# Patient Record
Sex: MALE | Race: WHITE | NOT HISPANIC OR LATINO | Employment: FULL TIME | URBAN - METROPOLITAN AREA
[De-identification: names, ages, dates, MRNs, and addresses within clinical notes are randomized per-mention and may not be internally consistent; named-entity substitution may affect disease eponyms.]

---

## 2017-01-04 ENCOUNTER — PATIENT OUTREACH (OUTPATIENT)
Dept: ADMINISTRATIVE | Facility: HOSPITAL | Age: 57
End: 2017-01-04

## 2017-01-12 ENCOUNTER — PATIENT OUTREACH (OUTPATIENT)
Dept: ADMINISTRATIVE | Facility: HOSPITAL | Age: 57
End: 2017-01-12

## 2017-01-17 ENCOUNTER — TELEPHONE (OUTPATIENT)
Dept: FAMILY MEDICINE | Facility: CLINIC | Age: 57
End: 2017-01-17

## 2017-01-17 ENCOUNTER — APPOINTMENT (OUTPATIENT)
Dept: LAB | Facility: HOSPITAL | Age: 57
End: 2017-01-17
Attending: UROLOGY
Payer: COMMERCIAL

## 2017-01-17 ENCOUNTER — OFFICE VISIT (OUTPATIENT)
Dept: UROLOGY | Facility: CLINIC | Age: 57
End: 2017-01-17
Payer: COMMERCIAL

## 2017-01-17 VITALS
WEIGHT: 213 LBS | HEART RATE: 83 BPM | SYSTOLIC BLOOD PRESSURE: 131 MMHG | HEIGHT: 66 IN | BODY MASS INDEX: 34.23 KG/M2 | DIASTOLIC BLOOD PRESSURE: 87 MMHG

## 2017-01-17 DIAGNOSIS — R31.29 MICROHEMATURIA: ICD-10-CM

## 2017-01-17 DIAGNOSIS — N52.03 COMBINED ARTERIAL INSUFFICIENCY AND CORPORO-VENOUS OCCLUSIVE ERECTILE DYSFUNCTION: Primary | ICD-10-CM

## 2017-01-17 DIAGNOSIS — Z87.442 HISTORY OF KIDNEY STONES: ICD-10-CM

## 2017-01-17 LAB
BILIRUB SERPL-MCNC: ABNORMAL MG/DL
BLOOD URINE, POC: 250
COLOR, POC UA: ABNORMAL
GLUCOSE UR QL STRIP: ABNORMAL
KETONES UR QL STRIP: ABNORMAL
LEUKOCYTE ESTERASE URINE, POC: ABNORMAL
MICROSCOPIC COMMENT: NORMAL
NITRITE, POC UA: ABNORMAL
PH, POC UA: 5
PROTEIN, POC: ABNORMAL
RBC #/AREA URNS HPF: 1 /HPF (ref 0–4)
SPECIFIC GRAVITY, POC UA: 1.01
UROBILINOGEN, POC UA: ABNORMAL
WBC #/AREA URNS HPF: 1 /HPF (ref 0–5)

## 2017-01-17 PROCEDURE — 81002 URINALYSIS NONAUTO W/O SCOPE: CPT | Mod: S$GLB,,, | Performed by: UROLOGY

## 2017-01-17 PROCEDURE — 81000 URINALYSIS NONAUTO W/SCOPE: CPT

## 2017-01-17 PROCEDURE — 99244 OFF/OP CNSLTJ NEW/EST MOD 40: CPT | Mod: 25,S$GLB,, | Performed by: UROLOGY

## 2017-01-17 PROCEDURE — 99999 PR PBB SHADOW E&M-EST. PATIENT-LVL III: CPT | Mod: PBBFAC,,, | Performed by: UROLOGY

## 2017-01-17 RX ORDER — SILDENAFIL 100 MG/1
100 TABLET, FILM COATED ORAL DAILY PRN
Start: 2017-01-17 | End: 2017-02-01 | Stop reason: SDUPTHER

## 2017-01-17 NOTE — MR AVS SNAPSHOT
[facility]  [address]  [place] [address]  Phone: [phone]                  Rich Ballard   2017 10:00 AM   Office Visit    Description:  Male : 1960   Provider:  Kaleb Dahl MD   Department:  [place] [facility]           Reason for Visit     Erectile Dysfunction           Diagnoses this Visit        Comments    Erectile dysfunction, unspecified erectile dysfunction type    -  Primary            To Do List           Future Appointments        Provider Department Dept Phone    2017 1:30 PM [phone]      Goals (5 Years of Data)     None      [facility]     [facility] Care Line -  Assistance  Registered nurses in the [facility] Call Center provide clinical advisement, health education, appointment booking, and other advisory services.  Call for this free service at [phone].             Medications           Message regarding Medications     Verify the changes and/or additions to your medication regime listed below are the same as discussed with your clinician today.  If any of these changes or additions are incorrect, please notify your healthcare provider.             Verify that the below list of medications is an accurate representation of the medications you are currently taking.  If none reported, the list may be blank. If incorrect, please contact your healthcare provider. Carry this list with you in case of emergency.           Current Medications     amlodipine-benazepril 5-20 mg (LOTREL) 5-20 mg per capsule Take 1 capsule by mouth once daily.    atorvastatin (LIPITOR) 20 MG tablet Take 1 tablet (20 mg total) by mouth once daily.    B CMPLX 4/VIT D3/C/FA/ZINC OX (VITAL-D RX ORAL) Take by mouth.    fluticasone (FLONASE) 50 mcg/actuation nasal spray USE TWO SPRAYS IN EACH NOSTRIL ONCE DAILY    nebivolol (BYSTOLIC) 10 MG Tab Take 1 tablet (10 mg total) by mouth once daily.  "   tadalafil (CIALIS) 5 MG tablet Take 1 tablet (5 mg total) by mouth daily as needed for Erectile Dysfunction.           Clinical Reference Information           Vital Signs - Last Recorded  Most recent update: 1/17/2017 10:23 AM by Amanda Ag MA    BP Pulse Ht Wt BMI    131/87 (BP Location: Right arm, Patient Position: Sitting, BP Method: Automatic) 83 5' 6" (1.676 m) 96.6 kg (213 lb) 34.38 kg/m2      Blood Pressure          Most Recent Value    BP  131/87      Allergies as of 1/17/2017     No Known Allergies      Immunizations Administered on Date of Encounter - 1/17/2017     None      "

## 2017-01-17 NOTE — PROGRESS NOTES
History and Physical Exam:  Consult from: dr lopez  Consult for: erectile dysfunction    Rich Ballard is a 56 y.o. male referred by Dr Lopez for evaluation of ED.    Dr Lopez rxed daily 5mg cialis in 9/2014  He took it for 1 month and discontinued due to back pain which he describes as kidney-stone-like pain waking him out of sleep  Did not notice change in erections but at that time was having problems with rigidity  Back then was progressively weaker erection and difficulty maintaining, which has progressed to diffiulty achieving  50/50 difficult achiveing vs maintaining now.    He is concerned that his ED may be related to andropause, which he has read about.  He did have am labs on 12/23/16 demonstrating T 673, free T 10.9.  When checked in 2014 T was also normal at 520    Udip today negative except 250 blood. Concerned about blood in urine.  Never seen gross blood in urine. No UA on record to assess previous micro.  + smoker - started up again last couple months after 1-2 yrs smoke free.  No family history of gu malignancy  + History of kidney stones - always passed on his own - last stone passage over 10-15 years - cut out fried foods/sodas  Time to time sciatic-like pain.      Past Medical History   Diagnosis Date    Allergy     CVA (cerebral infarction)     Hyperlipidemia     Hypertension        Past Surgical History   Procedure Laterality Date    Cervical fusion      Rotator cuff repair      Tumor removal from scapula     surgical correction of [place]    Family History   Problem Relation Age of Onset    Hypertension Mother     COPD Mother     Hypertension Father     Heart disease Father     COPD Father        Social History     Social History    Marital status:      Spouse name: N/A    Number of children: N/A    Years of education: N/A     Occupational History    Not on file.     Social History Main Topics    Smoking status: Former Smoker     Packs/day: 0.50      Types: Cigarettes    Smokeless tobacco: Never Used    Alcohol use No      Comment: very rarely    Drug use: Not on file    Sexual activity: Not on file     Other Topics Concern    Not on file     Social History Narrative       Review of patient's allergies indicates:   Allergen Reactions    No known allergies        Medications Reviewed: see MAR    ROS:    As noted above in HPI otherwise negative x10 systems reviewed.    PHYSICAL EXAM:    Vitals:    01/17/17 1008   BP: 131/87   Pulse: 83     Body mass index is 34.38 kg/(m^2).    General: Alert, cooperative, no distress, appears stated age  Head: Normocephalic, without obvious abnormality, atraumatic  Neck: no masses, no thyromegaly, no lymphadenopathy  Eyes: PERRL, conjunctiva/corneas clear  Lungs: Respirations unlabored, normal effort, no accessory muscle use  CV: Warm and well perfused extremities  Abdomen: Soft, non-tender, no CVA tenderness, no hepatosplenomegaly, no hernia  Penis: phallus normal, circumcised, well cared for, no plaques or lesions.   Scrotum: no cysts, no lesions, no rash, trace L hydrocele.   Epididymes: normal, nontender, symmetrical, small R epidid cyst.  Testes: normal, both descended, no masses.   Urethra: palpably normal with orthotopic meatus of normal size    AMAN: normal sphincter tone, no masses, no hemmorrhoids   PROSTATE: 20g, no nodules, non-tender, symmetrical.   Extremities: Extremities normal, atraumatic, no cyanosis or edema  Skin: Normal color, texture, and turgor, no rashes or lesions  Psych: Appropriate, well oriented, normal affect, normal mood  Neuro: Non-focal    Lab Results   Component Value Date    PSA 1.8 12/23/2016    PSA 1.1 02/11/2008       LABS:    No results found for this or any previous visit (from the past 336 hour(s)).      Assessment/Diagnosis:    1. Combined arterial insufficiency and corporo-venous occlusive erectile dysfunction  POCT URINE DIPSTICK WITHOUT MICROSCOPE   2. Microhematuria  Urinalysis  Microscopic   3. History of kidney stones  Urinalysis Microscopic       Plans:  We discussed all options for ED treatment and their proper use in detail including oral PDE5is, MUSE suppository, vacuum erection device, intracavernosal injection, and penile prosthesis. He does not want any invasive treatment at this point.  We discussed daily vs on demand use of PDE5is. He prefers to take oral medication, but does not want to try cialis again even at on demand dosing regarding back pain.  Discussed it was 2 years ago and may be coincidental, and he was willing to try other oral therapy.  Rxed viagra, and instructed on proper use such as 30 mins prior and on empty stomach. I did have a discussion with him especially given his comorbidities (HTN, HLD) and early onset ED that it is likely of vasculogenic origin and can be a harbinger of CAD. Advised he follow up for eval with Cardiology and will place referral, of which there is no urgency.   Did reassure him he is not hypodonadal or going through andropause given his lab results.  As well he noted a strong libido.  Also discussed that a urine disptick is not a very sensitive test, and to determine if there is clinically significant blood in urine will send UA micro. If >3-5 rbc/hpf, lab defined microhematuria, would pursue workup.  Will RTC 4-6 months for follow up or sooner prn. This note has been routed back to Dr Lopez. 45 mins spent in encounter over half in counseling.

## 2017-01-17 NOTE — LETTER
January 19, 2017      Ced Lopez MD  [address] [facility] [address] [place] [address]           [facility]  [address]  [facility] [address]  Phone: [phone]          Patient: Rich Ballard   MR Number: [id number]   YOB: 1960   Date of Visit: 1/17/2017       Dear Dr. Ced Lopez:    Thank you for referring Rich Ballard to me for evaluation. Attached you will find relevant portions of my assessment and plan of care.    If you have questions, please do not hesitate to call me. I look forward to following Rich Ballard along with you.    Sincerely,    Kaleb Dahl MD    Enclosure  CC:  No Recipients    If you would like to receive this communication electronically, please contact [email or web address] or [phone] to request more information on [employer] access.    For providers and/or their staff who would like to refer a patient to [facility], please contact us through our one-stop-shop provider referral line, [facility], at [phone].    If you feel you have received this communication in error or would no longer like to receive these types of communications, please e-mail [email or web address]

## 2017-01-18 ENCOUNTER — TELEPHONE (OUTPATIENT)
Dept: FAMILY MEDICINE | Facility: CLINIC | Age: 57
End: 2017-01-18

## 2017-01-18 ENCOUNTER — TELEPHONE (OUTPATIENT)
Dept: GASTROENTEROLOGY | Facility: CLINIC | Age: 57
End: 2017-01-18

## 2017-01-18 ENCOUNTER — PATIENT MESSAGE (OUTPATIENT)
Dept: FAMILY MEDICINE | Facility: CLINIC | Age: 57
End: 2017-01-18

## 2017-01-18 DIAGNOSIS — Z12.11 COLON CANCER SCREENING: Primary | ICD-10-CM

## 2017-01-18 NOTE — TELEPHONE ENCOUNTER
----- Message from Guera Ahumada sent at 1/18/2017 12:02 PM CST -----  Contact: [phone] Cynthia Ballard (Spouse)  [phone] Cynthia Ballard (Spouse) returning phone call

## 2017-01-18 NOTE — TELEPHONE ENCOUNTER
[place] his phone.   She will call segundo to get this set up she will let Rich know that his labs look good. His cholesterol is a little elevated. He should follow a low cholesterol diet and increase his exercise. She assured me she will let him know.    asked that i talk to his wife yesterday and will sign a involvement of care form when he comes in.

## 2017-01-18 NOTE — TELEPHONE ENCOUNTER
----- Message from Greg Shine sent at 1/18/2017  8:21 AM CST -----  Contact: pt's wife Cynthia  Pt wife needs to see about getting pt scheduled for a colonscopy  Call Back#[phone]  Thanks

## 2017-01-18 NOTE — TELEPHONE ENCOUNTER
----- Message from Benjamin Lenz sent at 1/18/2017  3:26 PM CST -----  Contact: Patient's wife, Ralph  Per Mrs. Rosas, Dr. Lopez sent over an referral for a colonoscopy. Please call Amanda Ralph to assist with scheduling the patient at [phone]. Thanks.

## 2017-01-18 NOTE — TELEPHONE ENCOUNTER
I signed the order for the colonoscopy.    Normally when I enter a colonoscopy our GI Department schedules this.  They contact the patient and set it up.  I don't know if it will work the same way in [place] so the patient may need to call to schedule the appointment.  I also don't know if the physician in [place] will require him to come in for an appointment first.    Regarding yesterday's message and appointment cancellation.  Please let him know that overall his labs were fairly stable.  His cholesterol was a little higher this time so I recommend can on a low-fat and low-cholesterol diet along with regular exercise.

## 2017-01-18 NOTE — TELEPHONE ENCOUNTER
Spoke with wife   Wants to see our gastrologist. He wants to get it done in [place] if possible with our [facility] md.

## 2017-01-23 DIAGNOSIS — Z13.9 SCREENING: Primary | ICD-10-CM

## 2017-01-30 ENCOUNTER — TELEPHONE (OUTPATIENT)
Dept: GASTROENTEROLOGY | Facility: CLINIC | Age: 57
End: 2017-01-30

## 2017-01-30 NOTE — TELEPHONE ENCOUNTER
----- Message from Mark Nguyen LPN sent at 1/30/2017  2:46 PM CST -----  Contact: Natalie (Wife) [phone]       ----- Message -----     From: RT Avni     Sent: 1/30/2017   2:36 PM       To: David Mar Staff    Natalie (Wife) [phone], requesting a call back soon concerning how is the pt's colonoscopy will be listed, she is trying to find out if it will be the pt's wellness or not, thanks.

## 2017-02-01 ENCOUNTER — TELEPHONE (OUTPATIENT)
Dept: UROLOGY | Facility: CLINIC | Age: 57
End: 2017-02-01

## 2017-02-01 ENCOUNTER — PATIENT MESSAGE (OUTPATIENT)
Dept: UROLOGY | Facility: CLINIC | Age: 57
End: 2017-02-01

## 2017-02-01 ENCOUNTER — ANESTHESIA EVENT (OUTPATIENT)
Dept: ENDOSCOPY | Facility: HOSPITAL | Age: 57
End: 2017-02-01
Payer: COMMERCIAL

## 2017-02-01 ENCOUNTER — SURGERY (OUTPATIENT)
Age: 57
End: 2017-02-01

## 2017-02-01 ENCOUNTER — HOSPITAL ENCOUNTER (OUTPATIENT)
Facility: HOSPITAL | Age: 57
Discharge: HOME | End: 2017-02-01
Attending: INTERNAL MEDICINE | Admitting: INTERNAL MEDICINE
Payer: COMMERCIAL

## 2017-02-01 ENCOUNTER — ANESTHESIA (OUTPATIENT)
Dept: ENDOSCOPY | Facility: HOSPITAL | Age: 57
End: 2017-02-01
Payer: COMMERCIAL

## 2017-02-01 VITALS
OXYGEN SATURATION: 96 % | BODY MASS INDEX: 32.95 KG/M2 | HEART RATE: 68 BPM | WEIGHT: 205 LBS | HEIGHT: 66 IN | SYSTOLIC BLOOD PRESSURE: 166 MMHG | TEMPERATURE: 98 F | RESPIRATION RATE: 16 BRPM | DIASTOLIC BLOOD PRESSURE: 95 MMHG

## 2017-02-01 VITALS — RESPIRATION RATE: 10 BRPM

## 2017-02-01 DIAGNOSIS — Z12.11 SCREEN FOR COLON CANCER: ICD-10-CM

## 2017-02-01 PROCEDURE — 63600175 PHARM REV CODE 636 W HCPCS: Performed by: NURSE ANESTHETIST, CERTIFIED REGISTERED

## 2017-02-01 PROCEDURE — 45380 COLONOSCOPY AND BIOPSY: CPT | Mod: 59,,, | Performed by: INTERNAL MEDICINE

## 2017-02-01 PROCEDURE — 45385 COLONOSCOPY W/LESION REMOVAL: CPT | Performed by: INTERNAL MEDICINE

## 2017-02-01 PROCEDURE — 27201012 HC FORCEPS, HOT/COLD, DISP: Performed by: INTERNAL MEDICINE

## 2017-02-01 PROCEDURE — 45385 COLONOSCOPY W/LESION REMOVAL: CPT | Mod: 33,,, | Performed by: INTERNAL MEDICINE

## 2017-02-01 PROCEDURE — D9220A PRA ANESTHESIA: Mod: PT,ANES,, | Performed by: ANESTHESIOLOGY

## 2017-02-01 PROCEDURE — 37000008 HC ANESTHESIA 1ST 15 MINUTES: Performed by: INTERNAL MEDICINE

## 2017-02-01 PROCEDURE — 88305 TISSUE EXAM BY PATHOLOGIST: CPT | Performed by: PATHOLOGY

## 2017-02-01 PROCEDURE — D9220A PRA ANESTHESIA: Mod: PT,CRNA,, | Performed by: NURSE ANESTHETIST, CERTIFIED REGISTERED

## 2017-02-01 PROCEDURE — 37000009 HC ANESTHESIA EA ADD 15 MINS: Performed by: INTERNAL MEDICINE

## 2017-02-01 PROCEDURE — 25000003 PHARM REV CODE 250: Performed by: INTERNAL MEDICINE

## 2017-02-01 PROCEDURE — 27201089 HC SNARE, DISP (ANY): Performed by: INTERNAL MEDICINE

## 2017-02-01 PROCEDURE — 45380 COLONOSCOPY AND BIOPSY: CPT | Performed by: INTERNAL MEDICINE

## 2017-02-01 RX ORDER — SODIUM CHLORIDE 9 MG/ML
INJECTION, SOLUTION INTRAVENOUS CONTINUOUS
Status: DISCONTINUED | OUTPATIENT
Start: 2017-02-01 | End: 2017-02-01 | Stop reason: HOSPADM

## 2017-02-01 RX ORDER — SILDENAFIL 100 MG/1
100 TABLET, FILM COATED ORAL
Qty: 8 TABLET | Refills: 6 | Status: SHIPPED | OUTPATIENT
Start: 2017-02-01 | End: 2020-01-15

## 2017-02-01 RX ORDER — PROPOFOL 10 MG/ML
VIAL (ML) INTRAVENOUS
Status: DISCONTINUED | OUTPATIENT
Start: 2017-02-01 | End: 2017-02-01

## 2017-02-01 RX ADMIN — PROPOFOL 40 MG: 10 INJECTION, EMULSION INTRAVENOUS at 09:02

## 2017-02-01 RX ADMIN — PROPOFOL 120 MG: 10 INJECTION, EMULSION INTRAVENOUS at 08:02

## 2017-02-01 RX ADMIN — SODIUM CHLORIDE: 0.9 INJECTION, SOLUTION INTRAVENOUS at 08:02

## 2017-02-01 NOTE — ANESTHESIA POSTPROCEDURE EVALUATION
"Anesthesia Post Evaluation    Patient: Rich Ballard    Procedure(s) Performed: Procedure(s) (LRB):  COLONOSCOPY (N/A)    Final Anesthesia Type: general  Patient location during evaluation: PACU  Patient participation: Yes- Able to Participate  Level of consciousness: awake and alert  Post-procedure vital signs: reviewed and stable  Pain management: adequate  Airway patency: patent  PONV status at discharge: No PONV  Anesthetic complications: no      Cardiovascular status: hemodynamically stable  Respiratory status: unassisted and room air  Hydration status: euvolemic  Follow-up not needed.        Visit Vitals    BP (!) 166/95    Pulse 68    Temp 36.8 °C (98.2 °F) (Oral)    Resp 16    Ht 5' 6" (1.676 m)    Wt 93 kg (205 lb)    SpO2 96%    BMI 33.09 kg/m2       Pain/Marisol Score: Pain Assessment Performed: Yes (2/1/2017  7:57 AM)  Presence of Pain: denies (2/1/2017  9:55 AM)  Marisol Score: 10 (2/1/2017  9:55 AM)      "

## 2017-02-01 NOTE — TRANSFER OF CARE
"Anesthesia Transfer of Care Note    Patient: Rich Ballard    Procedure(s) Performed: Procedure(s) (LRB):  COLONOSCOPY (N/A)    Patient location: GI    Anesthesia Type: general    Transport from OR: Transported from OR on 2-3 L/min O2 by NC with adequate spontaneous ventilation    Post pain: adequate analgesia    Post assessment: no apparent anesthetic complications and tolerated procedure well    Post vital signs: stable    Level of consciousness: awake, alert and oriented    Nausea/Vomiting: no nausea/vomiting    Complications: none          Last vitals:   Visit Vitals    BP (!) 159/91 (BP Location: Left arm, Patient Position: Lying, BP Method: Automatic)    Pulse 60    Temp 36.4 °C (97.6 °F) (Oral)    Resp 15    Ht 5' 6" (1.676 m)    Wt 93 kg (205 lb)    SpO2 96%    BMI 33.09 kg/m2     "

## 2017-02-01 NOTE — IP AVS SNAPSHOT
[address] [place] [address]  Phone: [phone]           Patient Discharge Instructions     Our goal is to set you up for success. This packet includes information on your condition, medications, and your home care. It will help you to care for yourself so you don't get sicker and need to go back to the hospital.     Please ask your nurse if you have any questions.        There are many details to remember when preparing to leave the hospital. Here is what you will need to do:    1. Take your medicine. If you are prescribed medications, review your Medication List in the following pages. You may have new medications to  at the pharmacy and others that you'll need to stop taking. Review the instructions for how and when to take your medications. Talk with your doctor or nurses if you are unsure of what to do.     2. Go to your follow-up appointments. Specific follow-up information is listed in the following pages. Your may be contacted by a transition nurse or clinical provider about future appointments. Be sure we have all of the phone numbers to reach you, if needed. Please contact your provider's office if you are unable to make an appointment.     3. Watch for warning signs. Your doctor or nurse will give you detailed warning signs to watch for and when to call for assistance. These instructions may also include educational information about your condition. If you experience any of warning signs to your health, call your doctor.               [facility]  Unless otherwise directed by your provider, please contact [facility], our nurse care line that is available for 24/7 assistance.     [phone] (toll-free)    Registered nurses in the [facility] provide clinical advisement, health education, appointment booking, and other advisory services.                    ** Verify the list of medication(s) below is accurate and up to date.  Carry this with you in case of emergency. If your medications have changed, please notify your healthcare provider.             Medication List      ASK your doctor about these medications        Additional Info                      amlodipine-benazepril 5-20 mg 5-20 mg per capsule   Commonly known as:  LOTREL   Quantity:  30 capsule   Refills:  5   Dose:  1 capsule    Instructions:  Take 1 capsule by mouth once daily.     Begin Date    AM    Noon    PM    Bedtime       atorvastatin 20 MG tablet   Commonly known as:  LIPITOR   Quantity:  30 tablet   Refills:  5   Dose:  20 mg    Instructions:  Take 1 tablet (20 mg total) by mouth once daily.     Begin Date    AM    Noon    PM    Bedtime       fluticasone 50 mcg/actuation nasal spray   Commonly known as:  FLONASE   Quantity:  16 g   Refills:  3    Instructions:  USE TWO SPRAYS IN EACH NOSTRIL ONCE DAILY     Begin Date    AM    Noon    PM    Bedtime       nebivolol 10 MG Tab   Commonly known as:  BYSTOLIC   Quantity:  30 tablet   Refills:  5   Dose:  10 mg    Instructions:  Take 1 tablet (10 mg total) by mouth once daily.     Begin Date    AM    Noon    PM    Bedtime       sildenafil 100 MG tablet   Commonly known as:  VIAGRA   Refills:  0   Dose:  100 mg    Instructions:  Take 1 tablet (100 mg total) by mouth daily as needed for Erectile Dysfunction.     Begin Date    AM    Noon    PM    Bedtime       VITAL-D RX ORAL   Refills:  0    Instructions:  Take by mouth.     Begin Date    AM    Noon    PM    Bedtime                  Please bring to all follow up appointments:    1. A copy of your discharge instructions.  2. All medicines you are currently taking in their original bottles.  3. Identification and insurance card.    Please arrive 15 minutes ahead of scheduled appointment time.    Please call 24 hours in advance if you must reschedule your appointment and/or time.        Your Scheduled Appointments     May 08, 2017  1:30 PM CDT   Established Patient Visit with  "[facility] ([place] [facility])    [address]  [place] [address] [phone]                  Discharge Instructions       Discharge Instructions: After Your Surgery/Procedure  Youve just had surgery. During surgery you were given medicine called anesthesia to keep you relaxed and free of pain. After surgery you may have some pain or nausea. This is common. Here are some tips for feeling better and getting well after surgery.     Stay on schedule with your medication.   Going home  Your doctor or nurse will show you how to take care of yourself when you go home. He or she will also answer your questions. Have an adult family member or friend drive you home.      For your safety we recommend these precaution for the first 24 hours after your procedure:  · Do not drive or use heavy equipment.  · Do not make important decisions or sign legal papers.  · Do not drink alcohol.  · Have someone stay with you, if needed. He or she can watch for problems and help keep you safe.  · Your concentration, balance, coordination, and judgement may be impaired for many hours after anesthesia.  Use caution when ambulating or standing up.     · You may feel weak and "washed out" after anesthesia and surgery.      Subtle residual effects of general anesthesia or sedation with regional / local anesthesia can last more than 24 hours.  Rest for the remainder of the day or longer if your Doctor/Surgeon has advised you to do so.  Although you may feel normal within the first 24 hours, your reflexes and mental ability may be impaired without you realizing it.  You may feel dizzy, lightheaded or sleepy for 24 hours or longer.      Be sure to go to all follow-up visits with your doctor. And rest after your surgery for as long as your doctor tells you to.  Coping with pain  If you have pain after surgery, pain medicine will help you feel better. Take it as told, before pain becomes severe. " Also, ask your doctor or pharmacist about other ways to control pain. This might be with heat, ice, or relaxation. And follow any other instructions your surgeon or nurse gives you.  Tips for taking pain medicine  To get the best relief possible, remember these points:  · Pain medicines can upset your stomach. Taking them with a little food may help.  · Most pain relievers taken by mouth need at least 20 to 30 minutes to start to work.  · Taking medicine on a schedule can help you remember to take it. Try to time your medicine so that you can take it before starting an activity. This might be before you get dressed, go for a walk, or sit down for dinner.  · Constipation is a common side effect of pain medicines. Call your doctor before taking any medicines such as laxatives or stool softeners to help ease constipation. Also ask if you should skip any foods. Drinking lots of fluids and eating foods such as fruits and vegetables that are high in fiber can also help. Remember, do not take laxatives unless your surgeon has prescribed them.  · Drinking alcohol and taking pain medicine can cause dizziness and slow your breathing. It can even be deadly. Do not drink alcohol while taking pain medicine.  · Pain medicine can make you react more slowly to things. Do not drive or run machinery while taking pain medicine.  Your health care provider may tell you to take acetaminophen to help ease your pain. Ask him or her how much you are supposed to take each day. Acetaminophen or other pain relievers may interact with your prescription medicines or other over-the-counter (OTC) drugs. Some prescription medicines have acetaminophen and other ingredients. Using both prescription and OTC acetaminophen for pain can cause you to overdose. Read the labels on your OTC medicines with care. This will help you to clearly know the list of ingredients, how much to take, and any warnings. It may also help you not take too  much acetaminophen. If you have questions or do not understand the information, ask your pharmacist or health care provider to explain it to you before you take the OTC medicine.  Managing nausea  Some people have an upset stomach after surgery. This is often because of anesthesia, pain, or pain medicine, or the stress of surgery. These tips will help you handle nausea and eat healthy foods as you get better. If you were on a special food plan before surgery, ask your doctor if you should follow it while you get better. These tips may help:  · Do not push yourself to eat. Your body will tell you when to eat and how much.  · Start off with clear liquids and soup. They are easier to digest.  · Next try semi-solid foods, such as mashed potatoes, applesauce, and gelatin, as you feel ready.  · Slowly move to solid foods. Dont eat fatty, rich, or spicy foods at first.  · Do not force yourself to have 3 large meals a day. Instead eat smaller amounts more often.  · Take pain medicines with a small amount of solid food, such as crackers or toast, to avoid nausea.     Call your surgeon if  · You still have pain an hour after taking medicine. The medicine may not be strong enough.  · You feel too sleepy, dizzy, or groggy. The medicine may be too strong.  · You have side effects like nausea, vomiting, or skin changes, such as rash, itching, or hives.       If you have obstructive sleep apnea  You were given anesthesia medicine during surgery to keep you comfortable and free of pain. After surgery, you may have more apnea spells because of this medicine and other medicines you were given. The spells may last longer than usual.   At home:  · Keep using the continuous positive airway pressure (CPAP) device when you sleep. Unless your health care provider tells you not to, use it when you sleep, day or night. CPAP is a common device used to treat obstructive sleep apnea.  · Talk with your provider before taking any pain medicine,  "muscle relaxants, or sedatives. Your provider will tell you about the possible dangers of taking these medicines.  © 6691-2335 [employer]. [address] [place] [address], [place] [address]. All rights reserved. This information is not intended as a substitute for professional medical care. Always follow your healthcare professional's instructions.  Discharge Instructions: After Your Surgery/Procedure  Youve just had surgery. During surgery you were given medicine called anesthesia to keep you relaxed and free of pain. After surgery you may have some pain or nausea. This is common. Here are some tips for feeling better and getting well after surgery.     Stay on schedule with your medication.   Going home  Your doctor or nurse will show you how to take care of yourself when you go home. He or she will also answer your questions. Have an adult family member or friend drive you home.      For your safety we recommend these precaution for the first 24 hours after your procedure:  · Do not drive or use heavy equipment.  · Do not make important decisions or sign legal papers.  · Do not drink alcohol.  · Have someone stay with you, if needed. He or she can watch for problems and help keep you safe.  · Your concentration, balance, coordination, and judgement may be impaired for many hours after anesthesia.  Use caution when ambulating or standing up.     · You may feel weak and "washed out" after anesthesia and surgery.      Subtle residual effects of general anesthesia or sedation with regional / local anesthesia can last more than 24 hours.  Rest for the remainder of the day or longer if your Doctor/Surgeon has advised you to do so.  Although you may feel normal within the first 24 hours, your reflexes and mental ability may be impaired without you realizing it.  You may feel dizzy, lightheaded or sleepy for 24 hours or longer.      Be sure to go to all follow-up visits with your doctor. And rest after your " surgery for as long as your doctor tells you to.  Coping with pain  If you have pain after surgery, pain medicine will help you feel better. Take it as told, before pain becomes severe. Also, ask your doctor or pharmacist about other ways to control pain. This might be with heat, ice, or relaxation. And follow any other instructions your surgeon or nurse gives you.  Tips for taking pain medicine  To get the best relief possible, remember these points:  · Pain medicines can upset your stomach. Taking them with a little food may help.  · Most pain relievers taken by mouth need at least 20 to 30 minutes to start to work.  · Taking medicine on a schedule can help you remember to take it. Try to time your medicine so that you can take it before starting an activity. This might be before you get dressed, go for a walk, or sit down for dinner.  · Constipation is a common side effect of pain medicines. Call your doctor before taking any medicines such as laxatives or stool softeners to help ease constipation. Also ask if you should skip any foods. Drinking lots of fluids and eating foods such as fruits and vegetables that are high in fiber can also help. Remember, do not take laxatives unless your surgeon has prescribed them.  · Drinking alcohol and taking pain medicine can cause dizziness and slow your breathing. It can even be deadly. Do not drink alcohol while taking pain medicine.  · Pain medicine can make you react more slowly to things. Do not drive or run machinery while taking pain medicine.  Your health care provider may tell you to take acetaminophen to help ease your pain. Ask him or her how much you are supposed to take each day. Acetaminophen or other pain relievers may interact with your prescription medicines or other over-the-counter (OTC) drugs. Some prescription medicines have acetaminophen and other ingredients. Using both prescription and OTC acetaminophen for pain can cause you to overdose. Read the  labels on your OTC medicines with care. This will help you to clearly know the list of ingredients, how much to take, and any warnings. It may also help you not take too much acetaminophen. If you have questions or do not understand the information, ask your pharmacist or health care provider to explain it to you before you take the OTC medicine.  Managing nausea  Some people have an upset stomach after surgery. This is often because of anesthesia, pain, or pain medicine, or the stress of surgery. These tips will help you handle nausea and eat healthy foods as you get better. If you were on a special food plan before surgery, ask your doctor if you should follow it while you get better. These tips may help:  · Do not push yourself to eat. Your body will tell you when to eat and how much.  · Start off with clear liquids and soup. They are easier to digest.  · Next try semi-solid foods, such as mashed potatoes, applesauce, and gelatin, as you feel ready.  · Slowly move to solid foods. Dont eat fatty, rich, or spicy foods at first.  · Do not force yourself to have 3 large meals a day. Instead eat smaller amounts more often.  · Take pain medicines with a small amount of solid food, such as crackers or toast, to avoid nausea.     Call your surgeon if  · You still have pain an hour after taking medicine. The medicine may not be strong enough.  · You feel too sleepy, dizzy, or groggy. The medicine may be too strong.  · You have side effects like nausea, vomiting, or skin changes, such as rash, itching, or hives.       If you have obstructive sleep apnea  You were given anesthesia medicine during surgery to keep you comfortable and free of pain. After surgery, you may have more apnea spells because of this medicine and other medicines you were given. The spells may last longer than usual.   At home:  · Keep using the continuous positive airway pressure (CPAP) device when you sleep. Unless your health care provider tells  you not to, use it when you sleep, day or night. CPAP is a common device used to treat obstructive sleep apnea.  · Talk with your provider before taking any pain medicine, muscle relaxants, or sedatives. Your provider will tell you about the possible dangers of taking these medicines.  © 4081-3642 [employer]. [address] [place] [address], [place] [address]. All rights reserved. This information is not intended as a substitute for professional medical care. Always follow your healthcare professional's instructions.    Eating a High-Fiber Diet  Fiber is what gives strength and structure to plants. Most grains, beans, vegetables, and fruits contain fiber. Foods rich in fiber are often low in calories and fat, and they fill you up more. They may also reduce your risks for certain health problems. To find out the amount of fiber in canned, packaged, or frozen foods, read the Nutrition Facts label. It tells you how much fiber is in a serving.    Types of fiber and their benefits  There are two types of fiber: insoluble and soluble. They both aid digestion and help you maintain a healthy weight.  · Insoluble fiber. This is found in whole grains, cereals, certain fruits and vegetables such as apple skin, corn, and carrots. Insoluble fiber may prevent constipation and reduce the risk for certain types of cancer.  · Soluble fiber. This type of fiber is in oats, beans, and certain fruits and vegetables such as strawberries and peas. Soluble fiber can reduce cholesterol, which may help lower the risk for heart disease. It also helps control blood sugar levels.  Look for high-fiber foods  Try these foods to add fiber to your diet:  · Whole-grain breads and cereals. Try to eat 6 to 8 ounces a day. Include wheat and oat bran cereals, whole-wheat muffins or toast, and corn tortillas in your meals.  · Fruits. Try to eat 2 cups a day. Apples, oranges, strawberries, pears, and bananas are good sources. (Note: Fruit juice is  low in fiber.)  · Vegetables. Try to eat at least 2.5 cups a day. Add asparagus, carrots, broccoli, peas, and corn to your meals.  · Beans. One cup of cooked lentils gives you over 15 grams of fiber. Try navy beans, lentils, and chickpeas.  · Seeds. A small handful of seeds gives you about 3 grams of fiber. Try sunflower seeds.  Keep track of your fiber  Keep track of how much fiber you eat. Start by reading food labels. Then eat a variety of foods high in fiber. As you begin to eat more fiber, ask your healthcare provider how much water you should be drinking to keep your digestive system working smoothly.  You should aim for a certain amount of fiber in your diet each day. If you are a woman, that amount is between 25 and 28 grams per day. Men should aim for 30 to 33 grams per day. After age 50, your daily fiber needs drop to 22 grams for women and 28 grams for men.  Before you reach for the fiber supplements, think about this. Fiber is found naturally in healthy whole foods. It gives you that feeling of fullness after you eat. Taking fiber supplements or eating fiber-enriched foods will not give you this full feeling.  Your fiber intake is a good measure for the quality of your overall diet. If you are missing out on your daily amount of fiber, you may be lacking other important nutrients as well.  © 9110-8547 [employer]. [address] [place] [address]. All rights reserved. This information is not intended as a substitute for professional medical care. Always follow your healthcare professional's instructions.        Diverticulosis    Diverticulosis means that small pouches have formed in the wall of your large intestine (colon). Most often, this problem causes no symptoms and is common as people age. But the pouches in the colon are at risk of becoming infected. When this happens, the condition is called diverticulitis. Although most people with diverticulosis never develop  diverticulitis, it is still not uncommon. Rectal bleeding can also occur and in less common situations, a type of colon inflammation called colitis.  While most people do not have symptoms, some people with diverticulosis may have:  · Abdominal cramps and pain  · Bloating  · Constipation  · Change in bowel habits  Causes  The exact cause of diverticulosis (and diverticulitis) has not been proved, but a few things are associated with the condition:  · Low-fiber diet  · Constipation  · Lack of exercise  Your healthcare provider will talk with you about how to manage your condition. Diet changes may be all that are needed to help control diverticulosis and prevent progression to diverticulitis. If you develop diverticulitis, you will likely need other treatments.  Home care  You may be told to take fiber supplements daily. Fiber adds bulk to the stool so that it passes through the colon more easily. Stool softeners may be recommended. You may also be given medications for pain relief. Be sure to take all medications as directed.  In the past, people were told to avoid corn, nuts, and seeds. This is no longer necessary.  Follow these guidelines when caring for yourself at home:  · Eat unprocessed foods that are high in fiber. Whole grains, fruits, and vegetables are good choices.  · Drink 6 to 8 glasses of water every day unless your healthcare provider has you limit how much fluid you should have.  · Watch for changes in your bowel movements. Tell your provider if you notice any changes.  · Begin an exercise program. Ask your provider how to get started. Generally, walking is the best.  · Get plenty of rest and sleep.  Follow-up care  Follow up with your healthcare provider, or as advised. Regular visits may be needed to check on your health. Sometimes special procedures such as colonoscopy, are needed after an episode of diverticulitis or blooding. Be sure to keep all your appointments.  If a stool sample was taken,  or cultures were done, you should be told if they are positive, or if your treatment needs to be changed. You can call as directed for the results.  If X-rays were done, a radiologist will look at them. You will be told if there is a change in your treatment.  If antibiotics were prescribed, be sure to finish them all.  When to seek medical advice  Call your healthcare provider right away if any of these occur:  · Fever of 100.4°F (38°C) or higher, or as directed by your healthcare provider  · Severe cramps in the lower left side of the abdomen or pain that is getting worse  · Tenderness in the lower left side of the abdomen or worsening pain throughout the abdomen  · Diarrhea or constipation that doesn't get better within 24 hours  · Nausea and vomiting  · Bleeding from the rectum  Call 911  Call emergency services if any of the following occur:  · Trouble breathing  · Confusion  · Very drowsy or trouble awakening  · Fainting or loss of consciousness  · Rapid heart rate  · Chest pain  © 1590-5311 [employer]. [address] [place] [address]. All rights reserved. This information is not intended as a substitute for professional medical care. Always follow your healthcare professional's instructions.        Hemorrhoids    Hemorrhoids are swollen and inflamed veins inside the rectum and near the anus. The rectum is the last several inches of the colon. The anus is the passage between the rectum and the outside of the body.  Causes  The veins can become swollen due to increased pressure in them. This is most often caused by:  · Chronic constipation or diarrhea  · Straining when having a bowel movement  · Sitting too long on the toilet  · A low-fiber diet  · Pregnancy  Symptoms  · Bleeding from the rectum (this may be noticeable after bowel movements)  · Lump near the anus  · Itching around the anus  · Pain around the anus  There are different types of hemorrhoids. Depending on the type you have  and the severity, you may be able to treat yourself at home. In some cases, a procedure may be the best treatment option. Your healthcare provider can tell you more about this, if needed.  Home care  General care  · To get relief from pain or itching, try:  ¨ Topical products. Your healthcare provider may prescribe or recommend creams, ointments, or pads that can be applied to the hemorrhoid. Use these exactly as directed.  ¨ Medicines. Your healthcare provider may recommend stool softeners, suppositories, or laxatives to help manage constipation. Use these exactly as directed.  ¨ Sitz baths. A sitz bath involves sitting in a few inches of warm bath water. Be careful not to make the water so hot that you burn yourself--test it before sitting in it. Soak for about 10 to 15 minutes a few times a day. This may help relieve pain.  Tips to help prevent hemorrhoids  · Eat more fiber. Fiber adds bulk to stool and absorbs water as it moves through your colon. This makes stool softer and easier to pass.  ¨ Increase the fiber in your diet with more fiber-rich foods. These include fresh fruit, vegetables, and whole grains.  ¨ Take a fiber supplement or bulking agent, if advised to by your provider. These include products such as psyllium or methylcellulose.  · Drink plenty of water, if directed to by your provider. This can help keep stool soft.  · Be more active. Frequent exercise aids digestion and helps prevent constipation. It may also help make bowel movements more regular.  · Dont strain during bowel movements. This can make hemorrhoids more likely. Also, dont sit on the toilet for long periods of time.  Follow-up care  Follow up with your healthcare provider, or as advised. If a culture or imaging tests were done, you will be notified of the results when they are ready. This may take a few days or longer.  When to seek medical advice  Call your healthcare provider right away if any of these occur:  · Increased  bleeding from the rectum  · Increased pain around the rectum or anus  · Weakness or dizziness  Call 911  Call 911 or return to the emergency department right away if any of these occur:  · Trouble breathing or swallowing  · Fainting or loss of consciousness  · Unusually fast heart rate  · Vomiting blood  · Large amounts of blood in stool     © 20002121-4840 [employer]. [address] [place] [address]. All rights reserved. This information is not intended as a substitute for professional medical care. Always follow your healthcare professional's instructions.        Understanding Colon and Rectal Polyps  The colon (also called the large intestine) is a muscular tube that forms the last part of the digestive tract. It absorbs water and stores food waste. The colon is about 4 to 6 feet long. The rectum is the last 6 inches of the colon. The colon and rectum have a smooth lining composed of millions of cells. Changes in these cells can lead to growths in the colon that can become cancerous and should be removed.     The colon has a smooth lining composed of millions of cells.     When the Colon Lining Changes  Changes that occur in the cells that line the colon or rectum can lead to growths called polyps. Over a period of years, polyps can turn cancerous. Removing polyps early may prevent cancer from ever forming.       Polyps  Polyps are fleshy clumps of tissue that form on the lining of the colon or rectum. Small polyps are usually benign (not cancerous). However, over time, cells in a polyp can change and become cancerous. Certain types of polyps known as adenomatous polyps are premalignant. The risk for invasive cancer increases with the size of the polyp and certain cell and gene features. This means that they can become cancerous if they're not removed. Hyperplastic polyps are benign. They can grow quite large and not turn cancerous.     Cancer  Almost all colorectal cancers start when polyp  "cells begin growing abnormally. As a cancerous tumor grows, it may involve more and more of the colon or rectum. In time, cancer can also grow beyond the colon or rectum and spread to nearby organs or to glands called lymph nodes. The cells can also travel to other parts of the body. This is known as metastasis. The earlier a cancerous tumor is removed, the better the chance of preventing its spread.  © 3249-0035 [employer]. [address] [place] [address], [place] [address]. All rights reserved. This information is not intended as a substitute for professional medical care. Always follow your healthcare professional's instructions.            Admission Information     Date & Time Provider Department CSN    2/1/2017  7:22 AM Kennedy Soto MD [facility] [id number]      Care Providers     Provider Role Specialty Primary office phone    Kennedy Soto MD Attending Provider Gastroenterology [phone]    Kennedy Soto MD Surgeon  Gastroenterology [phone]      Your Vitals Were     BP Pulse Temp Resp Height Weight    136/67 63 97.6 °F (36.4 °C) (Oral) 15 5' 6" (1.676 m) 93 kg (205 lb)    SpO2 BMI             98% 33.09 kg/m2         Recent Lab Values     No lab values to display.      Allergies as of 2/1/2017        Reactions    No Known Allergies       Advance Directives     An advance directive is a document which, in the event you are no longer able to make decisions for yourself, tells your healthcare team what kind of treatment you do or do not want to receive, or who you would like to make those decisions for you.  If you do not currently have an advance directive, [facility] encourages you to create one.  For more information call:  [phone]), [phone] ([phone]),  or log on to [email or web address].        Smoking Cessation     If you would like to quit smoking:   You may be eligible for free services if you are a [place] resident and started " smoking cigarettes before September 1, 1988.  Call the Smoking Cessation [employer] (SCT) toll free at (506) 129-7199 or (242) 286-9620.   Call 1-800-QUIT-NOW if you do not meet the above criteria.            Language Assistance Services     ATTENTION: Language assistance services are available, free of charge. Please call [phone].      ATENCIÓN: Si habla español, tiene a wolff disposición servicios gratuitos de asistencia lingüística. Llame al [phone].     CHÚ Ý: N?u b?n nói Ti?ng Vi?t, có các d?ch v? h? tr? ngôn ng? mi?n phí dành cho b?n. G?i s? [phone].         [facility] complies with applicable Federal civil rights laws and does not discriminate on the basis of race, color, national origin, age, disability, or sex.

## 2017-02-01 NOTE — TELEPHONE ENCOUNTER
Spoke with patient he states he was billed for code pre existing condition instead of wellness check. Patient states he was a new patient and was here for annually prostate exam referred by . Please advise

## 2017-02-01 NOTE — ANESTHESIA PREPROCEDURE EVALUATION
02/01/2017  Rich Ballard is a 56 y.o., male.    [facility] Anesthesia Evaluation    I have reviewed the Patient Summary Reports.    I have reviewed the Nursing Notes.      Review of Systems  Anesthesia Hx:  No problems with previous Anesthesia    Social:  Former Smoker    Hematology/Oncology:  Hematology Normal   Oncology Normal     EENT/Dental:EENT/Dental Normal   Cardiovascular:   Hypertension, well controlled hyperlipidemia    Pulmonary:  Pulmonary Normal    Hepatic/GI:   Bowel Prep.    Musculoskeletal:  Musculoskeletal Normal    Neurological:  Neurology Normal    Endocrine:  Endocrine Normal    Dermatological:  Skin Normal        Physical Exam  General:  Well nourished    Airway/Jaw/Neck:  AIRWAY FINDINGS: Normal      Eyes/Ears/Nose:  EYES/EARS/NOSE FINDINGS: Normal   Dental:  DENTAL FINDINGS: Normal   Chest/Lungs:  Chest/Lungs Findings: Clear to auscultation     Heart/Vascular:  Heart Findings: Rate: Normal  Rhythm: Regular Rhythm  Sounds: Normal  Heart murmur: negative Vascular Findings: Normal    Abdomen:  Abdomen Findings: Normal    Musculoskeletal:  Musculoskeletal Findings: Normal   Skin:  Skin Findings: Normal    Mental Status:  Mental Status Findings: Normal        Anesthesia Plan  Type of Anesthesia, risks & benefits discussed:  Anesthesia Type:  general  Patient's Preference:   Intra-op Monitoring Plan:   Intra-op Monitoring Plan Comments:   Post Op Pain Control Plan:   Post Op Pain Control Plan Comments:   Induction:   IV  Beta Blocker:  Patient is on a Beta-Blocker and has received one dose within the past 24 hours (No further documentation required).       Informed Consent: Patient understands risks and agrees with Anesthesia plan.  Questions answered. Anesthesia consent signed with patient.  ASA Score: 2     Day of Surgery Review of History & Physical:    H&P update referred to the provider.          Ready For Surgery From Anesthesia Perspective.

## 2017-02-01 NOTE — PLAN OF CARE
Patient awake, alert, and oriented.  No complaints of pain or discomfort at present time.  Abdomen soft and nontender; refuses po fluids at present time;  Dr. Soto spoke with patient and wife before discharge;   Ambulates without difficulty;  All instructions given and reviewed with patient and family;  Stable for discharge to home accompanied by wife

## 2017-02-01 NOTE — TELEPHONE ENCOUNTER
----- Message from Nereyda Claros sent at 2/1/2017  2:03 PM CST -----  Contact: Patient  Patient to talk about the first visit being coded wrong. Please call patient at [phone].

## 2017-02-01 NOTE — H&P
"[facility]    CC: screening colonoscopy    HPI 56 y.o. male presents for average risk screening colonoscopy.      Past Medical History   Diagnosis Date    Allergy     Hyperlipidemia     Hypertension          Review of Systems  General ROS: negative for - chills, fever or weight loss    Physical Examination  Visit Vitals    BP (!) 159/91 (BP Location: Left arm, Patient Position: Lying, BP Method: Automatic)    Pulse 60    Temp 97.6 °F (36.4 °C) (Oral)    Resp 15    Ht 5' 6" (1.676 m)    Wt 93 kg (205 lb)    SpO2 96%    BMI 33.09 kg/m2     General appearance: alert, cooperative, no distress  HENT: Normocephalic, atraumatic, neck symmetrical, no nasal discharge   Abdomen: soft, non tender, non distended BS present  Extremities: extremities symmetric; no clubbing, cyanosis, or edema    Assessment:   56 y.o. male presents for average risk screening colonoscopy.    Plan:  Screening colonoscopy today    Kennedy Soto MD  [facility]  [address], [address]  [place] [address]  Office: [phone]  Fax: [phone]    "

## 2017-02-01 NOTE — DISCHARGE INSTRUCTIONS
"Discharge Instructions: After Your Surgery/Procedure  Youve just had surgery. During surgery you were given medicine called anesthesia to keep you relaxed and free of pain. After surgery you may have some pain or nausea. This is common. Here are some tips for feeling better and getting well after surgery.     Stay on schedule with your medication.   Going home  Your doctor or nurse will show you how to take care of yourself when you go home. He or she will also answer your questions. Have an adult family member or friend drive you home.      For your safety we recommend these precaution for the first 24 hours after your procedure:  · Do not drive or use heavy equipment.  · Do not make important decisions or sign legal papers.  · Do not drink alcohol.  · Have someone stay with you, if needed. He or she can watch for problems and help keep you safe.  · Your concentration, balance, coordination, and judgement may be impaired for many hours after anesthesia.  Use caution when ambulating or standing up.     · You may feel weak and "washed out" after anesthesia and surgery.      Subtle residual effects of general anesthesia or sedation with regional / local anesthesia can last more than 24 hours.  Rest for the remainder of the day or longer if your Doctor/Surgeon has advised you to do so.  Although you may feel normal within the first 24 hours, your reflexes and mental ability may be impaired without you realizing it.  You may feel dizzy, lightheaded or sleepy for 24 hours or longer.      Be sure to go to all follow-up visits with your doctor. And rest after your surgery for as long as your doctor tells you to.  Coping with pain  If you have pain after surgery, pain medicine will help you feel better. Take it as told, before pain becomes severe. Also, ask your doctor or pharmacist about other ways to control pain. This might be with heat, ice, or relaxation. And follow any other instructions your surgeon or nurse gives " you.  Tips for taking pain medicine  To get the best relief possible, remember these points:  · Pain medicines can upset your stomach. Taking them with a little food may help.  · Most pain relievers taken by mouth need at least 20 to 30 minutes to start to work.  · Taking medicine on a schedule can help you remember to take it. Try to time your medicine so that you can take it before starting an activity. This might be before you get dressed, go for a walk, or sit down for dinner.  · Constipation is a common side effect of pain medicines. Call your doctor before taking any medicines such as laxatives or stool softeners to help ease constipation. Also ask if you should skip any foods. Drinking lots of fluids and eating foods such as fruits and vegetables that are high in fiber can also help. Remember, do not take laxatives unless your surgeon has prescribed them.  · Drinking alcohol and taking pain medicine can cause dizziness and slow your breathing. It can even be deadly. Do not drink alcohol while taking pain medicine.  · Pain medicine can make you react more slowly to things. Do not drive or run machinery while taking pain medicine.  Your health care provider may tell you to take acetaminophen to help ease your pain. Ask him or her how much you are supposed to take each day. Acetaminophen or other pain relievers may interact with your prescription medicines or other over-the-counter (OTC) drugs. Some prescription medicines have acetaminophen and other ingredients. Using both prescription and OTC acetaminophen for pain can cause you to overdose. Read the labels on your OTC medicines with care. This will help you to clearly know the list of ingredients, how much to take, and any warnings. It may also help you not take too much acetaminophen. If you have questions or do not understand the information, ask your pharmacist or health care provider to explain it to you before you take the OTC medicine.  Managing  nausea  Some people have an upset stomach after surgery. This is often because of anesthesia, pain, or pain medicine, or the stress of surgery. These tips will help you handle nausea and eat healthy foods as you get better. If you were on a special food plan before surgery, ask your doctor if you should follow it while you get better. These tips may help:  · Do not push yourself to eat. Your body will tell you when to eat and how much.  · Start off with clear liquids and soup. They are easier to digest.  · Next try semi-solid foods, such as mashed potatoes, applesauce, and gelatin, as you feel ready.  · Slowly move to solid foods. Dont eat fatty, rich, or spicy foods at first.  · Do not force yourself to have 3 large meals a day. Instead eat smaller amounts more often.  · Take pain medicines with a small amount of solid food, such as crackers or toast, to avoid nausea.     Call your surgeon if  · You still have pain an hour after taking medicine. The medicine may not be strong enough.  · You feel too sleepy, dizzy, or groggy. The medicine may be too strong.  · You have side effects like nausea, vomiting, or skin changes, such as rash, itching, or hives.       If you have obstructive sleep apnea  You were given anesthesia medicine during surgery to keep you comfortable and free of pain. After surgery, you may have more apnea spells because of this medicine and other medicines you were given. The spells may last longer than usual.   At home:  · Keep using the continuous positive airway pressure (CPAP) device when you sleep. Unless your health care provider tells you not to, use it when you sleep, day or night. CPAP is a common device used to treat obstructive sleep apnea.  · Talk with your provider before taking any pain medicine, muscle relaxants, or sedatives. Your provider will tell you about the possible dangers of taking these medicines.  © 9008-4213 [employer]. [address] [place] [address]  "[place] [address]. All rights reserved. This information is not intended as a substitute for professional medical care. Always follow your healthcare professional's instructions.  Discharge Instructions: After Your Surgery/Procedure  Youve just had surgery. During surgery you were given medicine called anesthesia to keep you relaxed and free of pain. After surgery you may have some pain or nausea. This is common. Here are some tips for feeling better and getting well after surgery.     Stay on schedule with your medication.   Going home  Your doctor or nurse will show you how to take care of yourself when you go home. He or she will also answer your questions. Have an adult family member or friend drive you home.      For your safety we recommend these precaution for the first 24 hours after your procedure:  · Do not drive or use heavy equipment.  · Do not make important decisions or sign legal papers.  · Do not drink alcohol.  · Have someone stay with you, if needed. He or she can watch for problems and help keep you safe.  · Your concentration, balance, coordination, and judgement may be impaired for many hours after anesthesia.  Use caution when ambulating or standing up.     · You may feel weak and "washed out" after anesthesia and surgery.      Subtle residual effects of general anesthesia or sedation with regional / local anesthesia can last more than 24 hours.  Rest for the remainder of the day or longer if your Doctor/Surgeon has advised you to do so.  Although you may feel normal within the first 24 hours, your reflexes and mental ability may be impaired without you realizing it.  You may feel dizzy, lightheaded or sleepy for 24 hours or longer.      Be sure to go to all follow-up visits with your doctor. And rest after your surgery for as long as your doctor tells you to.  Coping with pain  If you have pain after surgery, pain medicine will help you feel better. Take it as told, before pain becomes severe. Also, " ask your doctor or pharmacist about other ways to control pain. This might be with heat, ice, or relaxation. And follow any other instructions your surgeon or nurse gives you.  Tips for taking pain medicine  To get the best relief possible, remember these points:  · Pain medicines can upset your stomach. Taking them with a little food may help.  · Most pain relievers taken by mouth need at least 20 to 30 minutes to start to work.  · Taking medicine on a schedule can help you remember to take it. Try to time your medicine so that you can take it before starting an activity. This might be before you get dressed, go for a walk, or sit down for dinner.  · Constipation is a common side effect of pain medicines. Call your doctor before taking any medicines such as laxatives or stool softeners to help ease constipation. Also ask if you should skip any foods. Drinking lots of fluids and eating foods such as fruits and vegetables that are high in fiber can also help. Remember, do not take laxatives unless your surgeon has prescribed them.  · Drinking alcohol and taking pain medicine can cause dizziness and slow your breathing. It can even be deadly. Do not drink alcohol while taking pain medicine.  · Pain medicine can make you react more slowly to things. Do not drive or run machinery while taking pain medicine.  Your health care provider may tell you to take acetaminophen to help ease your pain. Ask him or her how much you are supposed to take each day. Acetaminophen or other pain relievers may interact with your prescription medicines or other over-the-counter (OTC) drugs. Some prescription medicines have acetaminophen and other ingredients. Using both prescription and OTC acetaminophen for pain can cause you to overdose. Read the labels on your OTC medicines with care. This will help you to clearly know the list of ingredients, how much to take, and any warnings. It may also help you not take too much acetaminophen. If  you have questions or do not understand the information, ask your pharmacist or health care provider to explain it to you before you take the OTC medicine.  Managing nausea  Some people have an upset stomach after surgery. This is often because of anesthesia, pain, or pain medicine, or the stress of surgery. These tips will help you handle nausea and eat healthy foods as you get better. If you were on a special food plan before surgery, ask your doctor if you should follow it while you get better. These tips may help:  · Do not push yourself to eat. Your body will tell you when to eat and how much.  · Start off with clear liquids and soup. They are easier to digest.  · Next try semi-solid foods, such as mashed potatoes, applesauce, and gelatin, as you feel ready.  · Slowly move to solid foods. Dont eat fatty, rich, or spicy foods at first.  · Do not force yourself to have 3 large meals a day. Instead eat smaller amounts more often.  · Take pain medicines with a small amount of solid food, such as crackers or toast, to avoid nausea.     Call your surgeon if  · You still have pain an hour after taking medicine. The medicine may not be strong enough.  · You feel too sleepy, dizzy, or groggy. The medicine may be too strong.  · You have side effects like nausea, vomiting, or skin changes, such as rash, itching, or hives.       If you have obstructive sleep apnea  You were given anesthesia medicine during surgery to keep you comfortable and free of pain. After surgery, you may have more apnea spells because of this medicine and other medicines you were given. The spells may last longer than usual.   At home:  · Keep using the continuous positive airway pressure (CPAP) device when you sleep. Unless your health care provider tells you not to, use it when you sleep, day or night. CPAP is a common device used to treat obstructive sleep apnea.  · Talk with your provider before taking any pain medicine, muscle relaxants, or  sedatives. Your provider will tell you about the possible dangers of taking these medicines.  © 3857-6387 [employer]. [address] [place] [address], [place] [address]. All rights reserved. This information is not intended as a substitute for professional medical care. Always follow your healthcare professional's instructions.    Eating a High-Fiber Diet  Fiber is what gives strength and structure to plants. Most grains, beans, vegetables, and fruits contain fiber. Foods rich in fiber are often low in calories and fat, and they fill you up more. They may also reduce your risks for certain health problems. To find out the amount of fiber in canned, packaged, or frozen foods, read the Nutrition Facts label. It tells you how much fiber is in a serving.    Types of fiber and their benefits  There are two types of fiber: insoluble and soluble. They both aid digestion and help you maintain a healthy weight.  · Insoluble fiber. This is found in whole grains, cereals, certain fruits and vegetables such as apple skin, corn, and carrots. Insoluble fiber may prevent constipation and reduce the risk for certain types of cancer.  · Soluble fiber. This type of fiber is in oats, beans, and certain fruits and vegetables such as strawberries and peas. Soluble fiber can reduce cholesterol, which may help lower the risk for heart disease. It also helps control blood sugar levels.  Look for high-fiber foods  Try these foods to add fiber to your diet:  · Whole-grain breads and cereals. Try to eat 6 to 8 ounces a day. Include wheat and oat bran cereals, whole-wheat muffins or toast, and corn tortillas in your meals.  · Fruits. Try to eat 2 cups a day. Apples, oranges, strawberries, pears, and bananas are good sources. (Note: Fruit juice is low in fiber.)  · Vegetables. Try to eat at least 2.5 cups a day. Add asparagus, carrots, broccoli, peas, and corn to your meals.  · Beans. One cup of cooked lentils gives you over 15 grams of  fiber. Try navy beans, lentils, and chickpeas.  · Seeds. A small handful of seeds gives you about 3 grams of fiber. Try sunflower seeds.  Keep track of your fiber  Keep track of how much fiber you eat. Start by reading food labels. Then eat a variety of foods high in fiber. As you begin to eat more fiber, ask your healthcare provider how much water you should be drinking to keep your digestive system working smoothly.  You should aim for a certain amount of fiber in your diet each day. If you are a woman, that amount is between 25 and 28 grams per day. Men should aim for 30 to 33 grams per day. After age 50, your daily fiber needs drop to 22 grams for women and 28 grams for men.  Before you reach for the fiber supplements, think about this. Fiber is found naturally in healthy whole foods. It gives you that feeling of fullness after you eat. Taking fiber supplements or eating fiber-enriched foods will not give you this full feeling.  Your fiber intake is a good measure for the quality of your overall diet. If you are missing out on your daily amount of fiber, you may be lacking other important nutrients as well.  © 7372-0164 [employer]. [address] [place] [address], [place] [address]. All rights reserved. This information is not intended as a substitute for professional medical care. Always follow your healthcare professional's instructions.        Diverticulosis    Diverticulosis means that small pouches have formed in the wall of your large intestine (colon). Most often, this problem causes no symptoms and is common as people age. But the pouches in the colon are at risk of becoming infected. When this happens, the condition is called diverticulitis. Although most people with diverticulosis never develop diverticulitis, it is still not uncommon. Rectal bleeding can also occur and in less common situations, a type of colon inflammation called colitis.  While most people do not have symptoms, some people  with diverticulosis may have:  · Abdominal cramps and pain  · Bloating  · Constipation  · Change in bowel habits  Causes  The exact cause of diverticulosis (and diverticulitis) has not been proved, but a few things are associated with the condition:  · Low-fiber diet  · Constipation  · Lack of exercise  Your healthcare provider will talk with you about how to manage your condition. Diet changes may be all that are needed to help control diverticulosis and prevent progression to diverticulitis. If you develop diverticulitis, you will likely need other treatments.  Home care  You may be told to take fiber supplements daily. Fiber adds bulk to the stool so that it passes through the colon more easily. Stool softeners may be recommended. You may also be given medications for pain relief. Be sure to take all medications as directed.  In the past, people were told to avoid corn, nuts, and seeds. This is no longer necessary.  Follow these guidelines when caring for yourself at home:  · Eat unprocessed foods that are high in fiber. Whole grains, fruits, and vegetables are good choices.  · Drink 6 to 8 glasses of water every day unless your healthcare provider has you limit how much fluid you should have.  · Watch for changes in your bowel movements. Tell your provider if you notice any changes.  · Begin an exercise program. Ask your provider how to get started. Generally, walking is the best.  · Get plenty of rest and sleep.  Follow-up care  Follow up with your healthcare provider, or as advised. Regular visits may be needed to check on your health. Sometimes special procedures such as colonoscopy, are needed after an episode of diverticulitis or blooding. Be sure to keep all your appointments.  If a stool sample was taken, or cultures were done, you should be told if they are positive, or if your treatment needs to be changed. You can call as directed for the results.  If X-rays were done, a radiologist will look at them.  You will be told if there is a change in your treatment.  If antibiotics were prescribed, be sure to finish them all.  When to seek medical advice  Call your healthcare provider right away if any of these occur:  · Fever of 100.4°F (38°C) or higher, or as directed by your healthcare provider  · Severe cramps in the lower left side of the abdomen or pain that is getting worse  · Tenderness in the lower left side of the abdomen or worsening pain throughout the abdomen  · Diarrhea or constipation that doesn't get better within 24 hours  · Nausea and vomiting  · Bleeding from the rectum  Call 911  Call emergency services if any of the following occur:  · Trouble breathing  · Confusion  · Very drowsy or trouble awakening  · Fainting or loss of consciousness  · Rapid heart rate  · Chest pain  © 3858-9507 [employer]. [address] [place] [address], [place] [address]. All rights reserved. This information is not intended as a substitute for professional medical care. Always follow your healthcare professional's instructions.        Hemorrhoids    Hemorrhoids are swollen and inflamed veins inside the rectum and near the anus. The rectum is the last several inches of the colon. The anus is the passage between the rectum and the outside of the body.  Causes  The veins can become swollen due to increased pressure in them. This is most often caused by:  · Chronic constipation or diarrhea  · Straining when having a bowel movement  · Sitting too long on the toilet  · A low-fiber diet  · Pregnancy  Symptoms  · Bleeding from the rectum (this may be noticeable after bowel movements)  · Lump near the anus  · Itching around the anus  · Pain around the anus  There are different types of hemorrhoids. Depending on the type you have and the severity, you may be able to treat yourself at home. In some cases, a procedure may be the best treatment option. Your healthcare provider can tell you more about this, if needed.  Home  care  General care  · To get relief from pain or itching, try:  ¨ Topical products. Your healthcare provider may prescribe or recommend creams, ointments, or pads that can be applied to the hemorrhoid. Use these exactly as directed.  ¨ Medicines. Your healthcare provider may recommend stool softeners, suppositories, or laxatives to help manage constipation. Use these exactly as directed.  ¨ Sitz baths. A sitz bath involves sitting in a few inches of warm bath water. Be careful not to make the water so hot that you burn yourself--test it before sitting in it. Soak for about 10 to 15 minutes a few times a day. This may help relieve pain.  Tips to help prevent hemorrhoids  · Eat more fiber. Fiber adds bulk to stool and absorbs water as it moves through your colon. This makes stool softer and easier to pass.  ¨ Increase the fiber in your diet with more fiber-rich foods. These include fresh fruit, vegetables, and whole grains.  ¨ Take a fiber supplement or bulking agent, if advised to by your provider. These include products such as psyllium or methylcellulose.  · Drink plenty of water, if directed to by your provider. This can help keep stool soft.  · Be more active. Frequent exercise aids digestion and helps prevent constipation. It may also help make bowel movements more regular.  · Dont strain during bowel movements. This can make hemorrhoids more likely. Also, dont sit on the toilet for long periods of time.  Follow-up care  Follow up with your healthcare provider, or as advised. If a culture or imaging tests were done, you will be notified of the results when they are ready. This may take a few days or longer.  When to seek medical advice  Call your healthcare provider right away if any of these occur:  · Increased bleeding from the rectum  · Increased pain around the rectum or anus  · Weakness or dizziness  Call 911  Call 911 or return to the emergency department right away if any of these occur:  · Trouble  breathing or swallowing  · Fainting or loss of consciousness  · Unusually fast heart rate  · Vomiting blood  · Large amounts of blood in stool     © 2000-2016 [employer]. [address] [place] [address], [place] [address]. All rights reserved. This information is not intended as a substitute for professional medical care. Always follow your healthcare professional's instructions.        Understanding Colon and Rectal Polyps  The colon (also called the large intestine) is a muscular tube that forms the last part of the digestive tract. It absorbs water and stores food waste. The colon is about 4 to 6 feet long. The rectum is the last 6 inches of the colon. The colon and rectum have a smooth lining composed of millions of cells. Changes in these cells can lead to growths in the colon that can become cancerous and should be removed.     The colon has a smooth lining composed of millions of cells.     When the Colon Lining Changes  Changes that occur in the cells that line the colon or rectum can lead to growths called polyps. Over a period of years, polyps can turn cancerous. Removing polyps early may prevent cancer from ever forming.       Polyps  Polyps are fleshy clumps of tissue that form on the lining of the colon or rectum. Small polyps are usually benign (not cancerous). However, over time, cells in a polyp can change and become cancerous. Certain types of polyps known as adenomatous polyps are premalignant. The risk for invasive cancer increases with the size of the polyp and certain cell and gene features. This means that they can become cancerous if they're not removed. Hyperplastic polyps are benign. They can grow quite large and not turn cancerous.     Cancer  Almost all colorectal cancers start when polyp cells begin growing abnormally. As a cancerous tumor grows, it may involve more and more of the colon or rectum. In time, cancer can also grow beyond the colon or rectum and spread to nearby organs  or to glands called lymph nodes. The cells can also travel to other parts of the body. This is known as metastasis. The earlier a cancerous tumor is removed, the better the chance of preventing its spread.  © 1997-3400 [employer]. [address] [place] [address], [place] [address]. All rights reserved. This information is not intended as a substitute for professional medical care. Always follow your healthcare professional's instructions.

## 2017-02-03 ENCOUNTER — PATIENT MESSAGE (OUTPATIENT)
Dept: GASTROENTEROLOGY | Facility: CLINIC | Age: 57
End: 2017-02-03

## 2017-02-06 ENCOUNTER — TELEPHONE (OUTPATIENT)
Dept: UROLOGY | Facility: CLINIC | Age: 57
End: 2017-02-06

## 2017-02-06 NOTE — TELEPHONE ENCOUNTER
----- Message from Kisha Basilio sent at 2/6/2017 12:20 PM CST -----  Contact: self  Patient returning a call from Jadyn    Please call him back at    Thanks

## 2017-08-04 RX ORDER — AMLODIPINE AND BENAZEPRIL HYDROCHLORIDE 5; 20 MG/1; MG/1
CAPSULE ORAL
Qty: 30 CAPSULE | Refills: 1 | Status: SHIPPED | OUTPATIENT
Start: 2017-08-04 | End: 2017-10-23 | Stop reason: SDUPTHER

## 2017-10-23 RX ORDER — AMLODIPINE AND BENAZEPRIL HYDROCHLORIDE 5; 20 MG/1; MG/1
CAPSULE ORAL
Qty: 30 CAPSULE | Refills: 1 | Status: SHIPPED | OUTPATIENT
Start: 2017-10-23 | End: 2017-12-29 | Stop reason: SDUPTHER

## 2017-12-29 DIAGNOSIS — I10 ESSENTIAL HYPERTENSION: Primary | ICD-10-CM

## 2017-12-29 RX ORDER — AMLODIPINE AND BENAZEPRIL HYDROCHLORIDE 5; 20 MG/1; MG/1
CAPSULE ORAL
Qty: 90 CAPSULE | Refills: 0 | Status: SHIPPED | OUTPATIENT
Start: 2017-12-29 | End: 2018-04-10 | Stop reason: SDUPTHER

## 2017-12-29 NOTE — LETTER
January 4, 2018    Rich Ballard  [address]  [place] [address]             [facility]  [address]  [facility] [address]  Phone: [phone]  Fax: [phone] Dear Mr. Ballard:    We have been trying to contact you to schedule updated labs. Please contact the office to schedule at [phone].      If you have any questions or concerns, please don't hesitate to call.    Sincerely,        Tala Aponte MA

## 2017-12-29 NOTE — PROGRESS NOTES
Refill Authorization Note     is requesting a refill authorization.    Brief assessment and rationale for refill: APPROVE: pt needs labs and f/u  Amount/Quantity of medication ordered: 90d         Refills Authorized: Yes  If authorized number of refills: 0        Medication-related problems identified: Requires labs  Medication Therapy Plan: Will order SCr/K, NTBS, BP elevated; needs f/u.  Approve 3 more mo  Name and strength of medication: AMLOD/BENAZEPRIL 5-20MG CAP  How patient will take medication: t1t po daily   Medication reconciliation completed: No  Comments:   Lab Results   Component Value Date    CREATININE 0.9 12/23/2016    BUN 16 12/23/2016     12/23/2016    K 4.5 12/23/2016     12/23/2016    CO2 25 12/23/2016      BP Readings from Last 3 Encounters:   02/01/17 (!) 166/95   01/17/17 131/87   12/21/16 130/88

## 2018-01-02 NOTE — TELEPHONE ENCOUNTER
Attempted to contact pt to schedule labs.     No answer; left message to return call to schedule.

## 2018-01-04 NOTE — TELEPHONE ENCOUNTER
Attempted to contact pt to schedule labs.     No answer;left message to return call to schedule.     3 attempts made. Letter sent

## 2018-02-27 ENCOUNTER — TELEPHONE (OUTPATIENT)
Dept: FAMILY MEDICINE | Facility: CLINIC | Age: 58
End: 2018-02-27

## 2018-02-27 DIAGNOSIS — I10 ESSENTIAL HYPERTENSION: ICD-10-CM

## 2018-02-27 DIAGNOSIS — E78.5 HYPERLIPIDEMIA, UNSPECIFIED HYPERLIPIDEMIA TYPE: ICD-10-CM

## 2018-02-27 DIAGNOSIS — Z00.00 PREVENTATIVE HEALTH CARE: Primary | ICD-10-CM

## 2018-02-27 NOTE — TELEPHONE ENCOUNTER
----- Message from Guera Ahumada sent at 2/27/2018  2:08 PM CST -----  Contact: [phone] spouse talya  [phone] spouse tayla, requesting lab orders

## 2018-02-28 NOTE — TELEPHONE ENCOUNTER
Please cancel the original labs that included just a potassium and creatinine. Patient needs a full CMP,  As well as the other labs I entered.

## 2018-03-01 ENCOUNTER — LAB VISIT (OUTPATIENT)
Dept: LAB | Facility: HOSPITAL | Age: 58
End: 2018-03-01
Attending: FAMILY MEDICINE
Payer: COMMERCIAL

## 2018-03-01 DIAGNOSIS — Z00.00 PREVENTATIVE HEALTH CARE: ICD-10-CM

## 2018-03-01 LAB
ALBUMIN SERPL BCP-MCNC: 4.1 G/DL
ALP SERPL-CCNC: 71 U/L
ALT SERPL W/O P-5'-P-CCNC: 48 U/L
ANION GAP SERPL CALC-SCNC: 8 MMOL/L
AST SERPL-CCNC: 26 U/L
BASOPHILS # BLD AUTO: 0.02 K/UL
BASOPHILS NFR BLD: 0.4 %
BILIRUB SERPL-MCNC: 0.4 MG/DL
BUN SERPL-MCNC: 17 MG/DL
CALCIUM SERPL-MCNC: 9.3 MG/DL
CHLORIDE SERPL-SCNC: 105 MMOL/L
CHOLEST SERPL-MCNC: 212 MG/DL
CHOLEST/HDLC SERPL: 6.8 {RATIO}
CO2 SERPL-SCNC: 25 MMOL/L
COMPLEXED PSA SERPL-MCNC: 2.4 NG/ML
CREAT SERPL-MCNC: 0.8 MG/DL
DIFFERENTIAL METHOD: ABNORMAL
EOSINOPHIL # BLD AUTO: 0.1 K/UL
EOSINOPHIL NFR BLD: 2.1 %
ERYTHROCYTE [DISTWIDTH] IN BLOOD BY AUTOMATED COUNT: 12 %
EST. GFR  (AFRICAN AMERICAN): >60 ML/MIN/1.73 M^2
EST. GFR  (NON AFRICAN AMERICAN): >60 ML/MIN/1.73 M^2
GLUCOSE SERPL-MCNC: 96 MG/DL
HCT VFR BLD AUTO: 48.9 %
HDLC SERPL-MCNC: 31 MG/DL
HDLC SERPL: 14.6 %
HGB BLD-MCNC: 15.1 G/DL
IMM GRANULOCYTES # BLD AUTO: 0 K/UL
IMM GRANULOCYTES NFR BLD AUTO: 0 %
LDLC SERPL CALC-MCNC: 146.6 MG/DL
LYMPHOCYTES # BLD AUTO: 3.2 K/UL
LYMPHOCYTES NFR BLD: 57.3 %
MCH RBC QN AUTO: 30.1 PG
MCHC RBC AUTO-ENTMCNC: 30.9 G/DL
MCV RBC AUTO: 98 FL
MONOCYTES # BLD AUTO: 0.5 K/UL
MONOCYTES NFR BLD: 9.3 %
NEUTROPHILS # BLD AUTO: 1.7 K/UL
NEUTROPHILS NFR BLD: 30.9 %
NONHDLC SERPL-MCNC: 181 MG/DL
NRBC BLD-RTO: 0 /100 WBC
PLATELET # BLD AUTO: 248 K/UL
PMV BLD AUTO: 9.1 FL
POTASSIUM SERPL-SCNC: 4.7 MMOL/L
PROT SERPL-MCNC: 7.4 G/DL
RBC # BLD AUTO: 5.01 M/UL
SODIUM SERPL-SCNC: 138 MMOL/L
TRIGL SERPL-MCNC: 172 MG/DL
WBC # BLD AUTO: 5.62 K/UL

## 2018-03-01 PROCEDURE — 85025 COMPLETE CBC W/AUTO DIFF WBC: CPT

## 2018-03-01 PROCEDURE — 84153 ASSAY OF PSA TOTAL: CPT

## 2018-03-01 PROCEDURE — 80061 LIPID PANEL: CPT

## 2018-03-01 PROCEDURE — 80053 COMPREHEN METABOLIC PANEL: CPT

## 2018-03-01 PROCEDURE — 36415 COLL VENOUS BLD VENIPUNCTURE: CPT | Mod: PO

## 2018-03-01 RX ORDER — NEBIVOLOL 10 MG/1
10 TABLET ORAL DAILY
Qty: 30 TABLET | Refills: 0 | Status: SHIPPED | OUTPATIENT
Start: 2018-03-01 | End: 2018-04-10 | Stop reason: SDUPTHER

## 2018-03-01 NOTE — PROGRESS NOTES
Refill Authorization Note     is requesting a refill authorization.    Brief assessment and rationale for refill: DEFER; pt may be nonadherent and needs annual appt; did not respond to 3 phone calls  Amount/Quantity of medication ordered: 30d        Refills Authorized: Yes  If authorized number of refills: 0        Medication-related problems identified:   Requires appointment  Requires labs  Medication education needs  Non-adherence (knowledge deficit) non-intentional  Medication Therapy Plan: Per MAR: pt should have been out a long time ago; also did not respond to phone calls to schedule labs nor appt; defer 30d  Name and strength of medication: nebivolol 10 mg  How patient will take medication: t1t po daily   Medication reconciliation completed: Yes  Comments:   BP Readings from Last 3 Encounters:   02/01/17 (!) 166/95   01/17/17 131/87   12/21/16 130/88      Pulse Readings from Last 3 Encounters:   02/01/17 68   01/17/17 83   12/21/16 74

## 2018-03-02 NOTE — TELEPHONE ENCOUNTER
Please schedule patient for annual appointment.  Of note, did not respond to prev attempts Thanks!

## 2018-03-02 NOTE — TELEPHONE ENCOUNTER
Attempted to contact pt to schedule annual exam.    No answer; left message to return rubia to schedule.

## 2018-03-05 NOTE — TELEPHONE ENCOUNTER
Tried to reach pt. No answer, left msg to call back.    [facility]

## 2018-04-10 ENCOUNTER — OFFICE VISIT (OUTPATIENT)
Dept: FAMILY MEDICINE | Facility: CLINIC | Age: 58
End: 2018-04-10
Payer: COMMERCIAL

## 2018-04-10 VITALS
BODY MASS INDEX: 33.43 KG/M2 | HEART RATE: 75 BPM | DIASTOLIC BLOOD PRESSURE: 80 MMHG | WEIGHT: 208 LBS | SYSTOLIC BLOOD PRESSURE: 130 MMHG | TEMPERATURE: 98 F | HEIGHT: 66 IN

## 2018-04-10 DIAGNOSIS — Z00.00 PREVENTATIVE HEALTH CARE: Primary | ICD-10-CM

## 2018-04-10 DIAGNOSIS — E78.5 HYPERLIPIDEMIA, UNSPECIFIED HYPERLIPIDEMIA TYPE: ICD-10-CM

## 2018-04-10 DIAGNOSIS — I10 ESSENTIAL HYPERTENSION: ICD-10-CM

## 2018-04-10 PROCEDURE — 99396 PREV VISIT EST AGE 40-64: CPT | Mod: S$GLB,,, | Performed by: FAMILY MEDICINE

## 2018-04-10 PROCEDURE — 99999 PR PBB SHADOW E&M-EST. PATIENT-LVL III: CPT | Mod: PBBFAC,,, | Performed by: FAMILY MEDICINE

## 2018-04-10 RX ORDER — AMLODIPINE AND BENAZEPRIL HYDROCHLORIDE 5; 20 MG/1; MG/1
1 CAPSULE ORAL DAILY
Qty: 90 CAPSULE | Refills: 3 | Status: SHIPPED | OUTPATIENT
Start: 2018-04-10 | End: 2019-04-22 | Stop reason: SDUPTHER

## 2018-04-10 RX ORDER — NEBIVOLOL 10 MG/1
10 TABLET ORAL DAILY
Qty: 90 TABLET | Refills: 3 | Status: SHIPPED | OUTPATIENT
Start: 2018-04-10 | End: 2019-04-22 | Stop reason: SDUPTHER

## 2018-04-10 NOTE — PROGRESS NOTES
Subjective:     THIS DOCUMENT WAS MADE IN PART WITH [employer] SOFTWARE. OCCASIONALLY THIS SOFTWARE MAY MISINTERPRET WORDS OR PHRASES.     Patient ID: Rich Ballard is a 57 y.o. male.    Chief Complaint: Annual Exam    HPI       Preventative health care   he presents with his wife for a wellness exam.    Essential hypertension   Patient has not seen me or followed up with me for his blood pressure in quite some time.      Hyperlipidemia, unspecified hyperlipidemia type   Uncontrolled.  Off medication, will not restart   Worried about what friends said  On Keto diet x 90 days, high in fat        Active Ambulatory Problems     Diagnosis Date Noted    Corneal edema, unspecified 06/06/2011    Unspecified disorder of conjunctiva 06/05/2011    Hypertension     Hyperlipidemia     Screen for colon cancer 02/01/2017     Resolved Ambulatory Problems     Diagnosis Date Noted    No Resolved Ambulatory Problems     Past Medical History:   Diagnosis Date    Allergy     Hyperlipidemia     Hypertension      Current Outpatient Prescriptions on File Prior to Visit   Medication Sig Dispense Refill    fluticasone (FLONASE) 50 mcg/actuation nasal spray USE TWO SPRAYS IN EACH NOSTRIL ONCE DAILY 16 g 3    [DISCONTINUED] amlodipine-benazepril 5-20 mg (LOTREL) 5-20 mg per capsule TAKE ONE CAPSULE BY MOUTH ONCE DAILY 90 capsule 0    [DISCONTINUED] nebivolol (BYSTOLIC) 10 MG Tab Take 1 tablet (10 mg total) by mouth once daily. Please call clinic to schedule labs and appointment; last fill 30 tablet 0    atorvastatin (LIPITOR) 20 MG tablet Take 1 tablet (20 mg total) by mouth once daily. 30 tablet 5    B CMPLX 4/VIT D3/C/FA/ZINC OX (VITAL-D RX ORAL) Take by mouth.      sildenafil (VIAGRA) 100 MG tablet Take 1 tablet (100 mg total) by mouth as needed for Erectile Dysfunction. 8 tablet 6     No current facility-administered medications on file prior to visit.      Review of patient's allergies indicates:   Allergen  Reactions    No known allergies      Social History   Substance Use Topics    Smoking status: Former Smoker     Packs/day: 0.50     Types: Cigarettes    Smokeless tobacco: Never Used    Alcohol use No      Comment: very rarely     Family History   Problem Relation Age of Onset    Hypertension Mother     COPD Mother     Hypertension Father     Heart disease Father     COPD Father        Review of Systems   Constitutional: Negative for fatigue, fever and unexpected weight change.   HENT: Negative for congestion, sinus pressure, trouble swallowing and voice change.    Eyes: Negative for visual disturbance.   Respiratory: Negative for cough, chest tightness and shortness of breath.    Cardiovascular: Negative for chest pain, palpitations and leg swelling.   Gastrointestinal: Negative for abdominal pain, blood in stool, constipation, diarrhea, nausea and vomiting.   Genitourinary: Negative for dysuria, frequency and hematuria.   Musculoskeletal: Negative for arthralgias, myalgias and neck pain.   Skin: Negative for rash and wound.   Neurological: Negative for dizziness, syncope, light-headedness and headaches.   Psychiatric/Behavioral: Negative.        Objective:      Physical Exam   Constitutional: He is oriented to person, place, and time. He appears well-developed and well-nourished. No distress.   HENT:   Head: Normocephalic and atraumatic.   Right Ear: Tympanic membrane, external ear and ear canal normal.   Left Ear: Tympanic membrane, external ear and ear canal normal.   Nose: Nose normal.   Mouth/Throat: Oropharynx is clear and moist.   Eyes: Conjunctivae and EOM are normal. Pupils are equal, round, and reactive to light. Right eye exhibits no discharge. Left eye exhibits no discharge. No scleral icterus.   Neck: Normal range of motion. Neck supple. No JVD present. No tracheal deviation present. No thyromegaly present.   Cardiovascular: Normal rate, regular rhythm, normal heart sounds and intact distal  "pulses.  Exam reveals no gallop and no friction rub.    No murmur heard.  Pulmonary/Chest: Effort normal and breath sounds normal. No stridor. No respiratory distress. He has no wheezes. He has no rales. He exhibits no tenderness.   Abdominal: Soft. Bowel sounds are normal. He exhibits no distension and no mass. There is no tenderness. There is no rebound and no guarding.   Lymphadenopathy:     He has no cervical adenopathy.   Neurological: He is alert and oriented to person, place, and time. He has normal reflexes. He displays normal reflexes. No cranial nerve deficit. He exhibits normal muscle tone. Coordination normal.   Skin: Skin is warm. No rash noted. He is not diaphoretic.   Psychiatric: He has a normal mood and affect. His behavior is normal. Judgment and thought content normal.   Vitals reviewed.      Vitals:    04/10/18 1059   BP: 130/80   BP Location: Left arm   Patient Position: Sitting   BP Method: Medium (Manual)   Pulse: 75   Temp: 98.1 °F (36.7 °C)   TempSrc: Oral   Weight: 94.3 kg (208 lb 0.1 oz)   Height: 5' 6" (1.676 m)     Results for orders placed or performed in visit on 03/01/18   Comprehensive metabolic panel   Result Value Ref Range    Sodium 138 136 - 145 mmol/L    Potassium 4.7 3.5 - 5.1 mmol/L    Chloride 105 95 - 110 mmol/L    CO2 25 23 - 29 mmol/L    Glucose 96 70 - 110 mg/dL    BUN, Bld 17 6 - 20 mg/dL    Creatinine 0.8 0.5 - 1.4 mg/dL    Calcium 9.3 8.7 - 10.5 mg/dL    Total Protein 7.4 6.0 - 8.4 g/dL    Albumin 4.1 3.5 - 5.2 g/dL    Total Bilirubin 0.4 0.1 - 1.0 mg/dL    Alkaline Phosphatase 71 55 - 135 U/L    AST 26 10 - 40 U/L    ALT 48 (H) 10 - 44 U/L    Anion Gap 8 8 - 16 mmol/L    eGFR if African American >60.0 >60 mL/min/1.73 m^2    eGFR if non African American >60.0 >60 mL/min/1.73 m^2   Lipid panel   Result Value Ref Range    Cholesterol 212 (H) 120 - 199 mg/dL    Triglycerides 172 (H) 30 - 150 mg/dL    HDL 31 (L) 40 - 75 mg/dL    LDL Cholesterol 146.6 63.0 - 159.0 mg/dL    " "HDL/Chol Ratio 14.6 (L) 20.0 - 50.0 %    Total Cholesterol/HDL Ratio 6.8 (H) 2.0 - 5.0    Non-HDL Cholesterol 181 mg/dL   PSA, Screening   Result Value Ref Range    PSA, SCREEN 2.4 0.00 - 4.00 ng/mL   CBC auto differential   Result Value Ref Range    WBC 5.62 3.90 - 12.70 K/uL    RBC 5.01 4.60 - 6.20 M/uL    Hemoglobin 15.1 14.0 - 18.0 g/dL    Hematocrit 48.9 40.0 - 54.0 %    MCV 98 82 - 98 fL    MCH 30.1 27.0 - 31.0 pg    MCHC 30.9 (L) 32.0 - 36.0 g/dL    RDW 12.0 11.5 - 14.5 %    Platelets 248 150 - 350 K/uL    MPV 9.1 (L) 9.2 - 12.9 fL    Immature Granulocytes 0.0 0.0 - 0.5 %    Gran # (ANC) 1.7 (L) 1.8 - 7.7 K/uL    Immature Grans (Abs) 0.00 0.00 - 0.04 K/uL    Lymph # 3.2 1.0 - 4.8 K/uL    Mono # 0.5 0.3 - 1.0 K/uL    Eos # 0.1 0.0 - 0.5 K/uL    Baso # 0.02 0.00 - 0.20 K/uL    nRBC 0 0 /100 WBC    Gran% 30.9 (L) 38.0 - 73.0 %    Lymph% 57.3 (H) 18.0 - 48.0 %    Mono% 9.3 4.0 - 15.0 %    Eosinophil% 2.1 0.0 - 8.0 %    Basophil% 0.4 0.0 - 1.9 %    Differential Method Automated        Assessment:       1. Preventative health care    2. Essential hypertension    3. Hyperlipidemia, unspecified hyperlipidemia type        Plan:       Rich was seen today for annual exam.    Diagnoses and all orders for this visit:    Preventative health care  -     Lipid panel; Future  -     Basic metabolic panel; Future  -     Hepatitis C antibody; Future    Essential hypertension  -     amlodipine-benazepril 5-20 mg (LOTREL) 5-20 mg per capsule; Take 1 capsule by mouth once daily.   This appears controlled    Hyperlipidemia, unspecified hyperlipidemia type  -     Lipid panel; Future  -     Basic metabolic panel; Future   this is uncontrolled. He stopped atorvastatin. Not because he has experienced any side effects but rather because he claims he knows to friends who had "liver sclerosis" and apparently he and they are certain this was a direct result from the statin therapy. I discussed his results, I discussed the abnormal values. " "I discussed the risk ratio.  I discussed common side effects from statins and less common but possible side effects, I discussed the benefit and the risks.   It is my recommendation that he restart a medication, a statin medication, because his risk ratio is greater than six which places him in a high risk category for atherosclerosis.  But he has made up his mind because of what his friends have told him ( And they are not physicians by the way, in case anyone was wondering).  He states "I'll take my chances at a heart attack".  Apparently he thinks he has a better chance of surviving a heart attack than he does of surviving a statin medication. But I'm unable to waver his opinion.  But he at least says he'll try to improve his diet and reduce some of the saturated and unhealthy fats. So in summary: It's not my job to force things onto people,  It is not my job to violate their free will ( even though insurance companies are trying to force us to do so, and of course the origin of the word doctor comes from the Latin term which means teacher. It does not mean enforcer and it does not mean dictator, it would do well for our "leaders" in medicine, government, and insurance to remind themselves of this) it is my job to educate them and make recommendations. And I have done this and he does not wish to follow with my recommendations. That is his choice. And he is an intellegent man and fully understands what I am trying to convey. Of course if he changes his mind I will be happy to help in any way that he should choose.    Other orders  -     nebivolol (BYSTOLIC) 10 MG Tab; Take 1 tablet (10 mg total) by mouth once daily. Please call clinic to schedule labs and appointment; last fill           "

## 2019-03-20 RX ORDER — FLUTICASONE PROPIONATE 50 MCG
SPRAY, SUSPENSION (ML) NASAL
Qty: 16 G | Refills: 3 | Status: SHIPPED | OUTPATIENT
Start: 2019-03-20 | End: 2020-01-15 | Stop reason: SDUPTHER

## 2019-04-22 DIAGNOSIS — I10 ESSENTIAL HYPERTENSION: ICD-10-CM

## 2019-04-22 RX ORDER — NEBIVOLOL HYDROCHLORIDE 10 MG/1
TABLET ORAL
Qty: 30 TABLET | Refills: 5 | Status: SHIPPED | OUTPATIENT
Start: 2019-04-22 | End: 2019-10-22 | Stop reason: SDUPTHER

## 2019-04-22 RX ORDER — AMLODIPINE AND BENAZEPRIL HYDROCHLORIDE 5; 20 MG/1; MG/1
1 CAPSULE ORAL DAILY
Qty: 30 CAPSULE | Refills: 5 | Status: SHIPPED | OUTPATIENT
Start: 2019-04-22 | End: 2019-10-22 | Stop reason: SDUPTHER

## 2019-10-22 DIAGNOSIS — I10 ESSENTIAL HYPERTENSION: ICD-10-CM

## 2019-10-22 RX ORDER — NEBIVOLOL HYDROCHLORIDE 10 MG/1
TABLET ORAL
Qty: 30 TABLET | Refills: 1 | Status: SHIPPED | OUTPATIENT
Start: 2019-10-22 | End: 2019-12-28

## 2019-10-22 RX ORDER — AMLODIPINE AND BENAZEPRIL HYDROCHLORIDE 5; 20 MG/1; MG/1
1 CAPSULE ORAL DAILY
Qty: 30 CAPSULE | Refills: 1 | Status: SHIPPED | OUTPATIENT
Start: 2019-10-22 | End: 2019-12-28

## 2019-12-28 DIAGNOSIS — I10 ESSENTIAL HYPERTENSION: ICD-10-CM

## 2019-12-28 RX ORDER — NEBIVOLOL HYDROCHLORIDE 10 MG/1
TABLET ORAL
Qty: 30 TABLET | Refills: 1 | Status: SHIPPED | OUTPATIENT
Start: 2019-12-28 | End: 2020-01-15 | Stop reason: SDUPTHER

## 2019-12-28 RX ORDER — AMLODIPINE AND BENAZEPRIL HYDROCHLORIDE 5; 20 MG/1; MG/1
1 CAPSULE ORAL DAILY
Qty: 30 CAPSULE | Refills: 1 | Status: SHIPPED | OUTPATIENT
Start: 2019-12-28 | End: 2020-01-15 | Stop reason: DRUGHIGH

## 2020-01-14 DIAGNOSIS — I10 ESSENTIAL HYPERTENSION: ICD-10-CM

## 2020-01-14 DIAGNOSIS — Z00.00 ROUTINE MEDICAL EXAM: Primary | ICD-10-CM

## 2020-01-14 DIAGNOSIS — E78.5 HYPERLIPIDEMIA, UNSPECIFIED HYPERLIPIDEMIA TYPE: ICD-10-CM

## 2020-01-14 DIAGNOSIS — Z11.59 NEED FOR HEPATITIS C SCREENING TEST: ICD-10-CM

## 2020-01-14 DIAGNOSIS — Z12.5 ENCOUNTER FOR SCREENING FOR MALIGNANT NEOPLASM OF PROSTATE: ICD-10-CM

## 2020-01-14 NOTE — TELEPHONE ENCOUNTER
----- Message from Crystal Garcia sent at 1/14/2020  4:26 PM CST -----  Contact: Cynthia Ballard (Spouse)  Cynthia Ballard (Spouse) calling wanting to get the pt in tomorrow 1/15/20 from 10 am to 11:30 am or something later on the day for medication refill,please...[phone]

## 2020-01-15 ENCOUNTER — OFFICE VISIT (OUTPATIENT)
Dept: FAMILY MEDICINE | Facility: CLINIC | Age: 60
End: 2020-01-15
Payer: COMMERCIAL

## 2020-01-15 VITALS
WEIGHT: 222.44 LBS | HEART RATE: 106 BPM | SYSTOLIC BLOOD PRESSURE: 114 MMHG | BODY MASS INDEX: 35.75 KG/M2 | DIASTOLIC BLOOD PRESSURE: 80 MMHG | HEIGHT: 66 IN | OXYGEN SATURATION: 99 %

## 2020-01-15 DIAGNOSIS — I10 ESSENTIAL HYPERTENSION: Primary | ICD-10-CM

## 2020-01-15 PROCEDURE — 3079F DIAST BP 80-89 MM HG: CPT | Mod: CPTII,S$GLB,, | Performed by: NURSE PRACTITIONER

## 2020-01-15 PROCEDURE — 3008F PR BODY MASS INDEX (BMI) DOCUMENTED: ICD-10-PCS | Mod: CPTII,S$GLB,, | Performed by: NURSE PRACTITIONER

## 2020-01-15 PROCEDURE — 3074F SYST BP LT 130 MM HG: CPT | Mod: CPTII,S$GLB,, | Performed by: NURSE PRACTITIONER

## 2020-01-15 PROCEDURE — 3074F PR MOST RECENT SYSTOLIC BLOOD PRESSURE < 130 MM HG: ICD-10-PCS | Mod: CPTII,S$GLB,, | Performed by: NURSE PRACTITIONER

## 2020-01-15 PROCEDURE — 3008F BODY MASS INDEX DOCD: CPT | Mod: CPTII,S$GLB,, | Performed by: NURSE PRACTITIONER

## 2020-01-15 PROCEDURE — 99214 OFFICE O/P EST MOD 30 MIN: CPT | Mod: S$GLB,,, | Performed by: NURSE PRACTITIONER

## 2020-01-15 PROCEDURE — 99999 PR PBB SHADOW E&M-EST. PATIENT-LVL III: ICD-10-PCS | Mod: PBBFAC,,, | Performed by: NURSE PRACTITIONER

## 2020-01-15 PROCEDURE — 99214 PR OFFICE/OUTPT VISIT, EST, LEVL IV, 30-39 MIN: ICD-10-PCS | Mod: S$GLB,,, | Performed by: NURSE PRACTITIONER

## 2020-01-15 PROCEDURE — 99999 PR PBB SHADOW E&M-EST. PATIENT-LVL III: CPT | Mod: PBBFAC,,, | Performed by: NURSE PRACTITIONER

## 2020-01-15 PROCEDURE — 3079F PR MOST RECENT DIASTOLIC BLOOD PRESSURE 80-89 MM HG: ICD-10-PCS | Mod: CPTII,S$GLB,, | Performed by: NURSE PRACTITIONER

## 2020-01-15 RX ORDER — BENAZEPRIL HYDROCHLORIDE 40 MG/1
40 TABLET ORAL DAILY
Qty: 90 TABLET | Refills: 0 | Status: SHIPPED | OUTPATIENT
Start: 2020-01-15 | End: 2020-04-07 | Stop reason: SDUPTHER

## 2020-01-15 RX ORDER — NEBIVOLOL 10 MG/1
10 TABLET ORAL DAILY
Qty: 30 TABLET | Refills: 1 | Status: SHIPPED | OUTPATIENT
Start: 2020-01-15 | End: 2020-04-07

## 2020-01-15 RX ORDER — AMLODIPINE BESYLATE 10 MG/1
10 TABLET ORAL DAILY
Qty: 90 TABLET | Refills: 0 | Status: SHIPPED | OUTPATIENT
Start: 2020-01-15 | End: 2020-04-07 | Stop reason: SDUPTHER

## 2020-01-15 RX ORDER — AMLODIPINE AND BENAZEPRIL HYDROCHLORIDE 10; 40 MG/1; MG/1
1 CAPSULE ORAL DAILY
Qty: 90 CAPSULE | Refills: 0 | Status: SHIPPED | OUTPATIENT
Start: 2020-01-15 | End: 2020-04-07

## 2020-01-15 NOTE — PROGRESS NOTES
This dictation has been generated using [employer] Dictation some phonetic errors may occur. Please contact author for clarification if needed.     Problem List Items Addressed This Visit     Hypertension - Primary          Orders Placed This Encounter    amlodipine-benazepril (LOTREL) 10-40 mg per capsule    nebivolol (BYSTOLIC) 10 MG Tab     Home blood pressure readings reportedly uncontrolled increase Lotrel at today's visit.  He is a .  Labs reviewed due for annual labs in March.   Needs to follow up with PCP in mid March.  Labs entered on prior telephone encounter from yesterday  Cbc,cmp, lipid, psa, tsh,u/a, hepc,  Patient Instructions   Return to clinic for visit in 2 months.  Labs after March the 1st.     Monitor bp at home.      Follow up for pcp annual exam in March.    ________________________________________________________________  ________________________________________________________________      Chief Complaint   Patient presents with    Medication Refill     History of present illness  This 59 y.o. presents today for complaint of following up for annual exam but 2 months early.  Had labs last March on the 1st.  He has hypertension hyperlipidemia.  He had a colonoscopy in 2017.  He does work as a .  Notes his blood pressure has not been controlled at home.  Per his report systolic blood pressure in the 150s and diastolic in the 90s.  Had been taking amlodipine 10 mg and benazepril 40 mg however this was decreased at last visit and advised to monitor.  Patient notes recent trending upward of blood pressure.  Taking meds as directed.  Denies any diet changes.  Does note stress level due to job.  No excessive caffeine intake.  Occasional alcohol intake.  Interested in going back to the 10/40 dose from current 5/20.  Review of systems  No fever or chills  No unexpected weight loss  No chest pain shortness of breath palpitations and dyspnea on exertion  No nausea vomiting  diarrhea  No urinary urgency frequency dysuria nocturia  No rash itching urticaria    Past medical and social history reviewed    Past Medical History:   Diagnosis Date    Allergy     Hyperlipidemia     Hypertension        Past Surgical History:   Procedure Laterality Date    APPENDECTOMY      CERVICAL FUSION      COLONOSCOPY N/A 2/1/2017    Procedure: COLONOSCOPY;  Surgeon: Kennedy Soto MD;  [facility];  Service: Endoscopy;  Laterality: N/A;    ppp      ROTATOR CUFF REPAIR      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         Family History   Problem Relation Age of Onset    Hypertension Mother     COPD Mother     Hypertension Father     Heart disease Father     COPD Father        Social History     Socioeconomic History    Marital status:      Spouse name: Not on file    Number of children: Not on file    Years of education: Not on file    Highest education level: Not on file   Occupational History    Not on file   Social Needs    Financial resource strain: Not on file    Food insecurity:     Worry: Not on file     Inability: Not on file    Transportation needs:     Medical: Not on file     Non-medical: Not on file   Tobacco Use    Smoking status: Former Smoker     Packs/day: 0.50     Types: Cigarettes    Smokeless tobacco: Never Used   Substance and Sexual Activity    Alcohol use: No     Comment: very rarely    Drug use: No    Sexual activity: Not on file   Lifestyle    Physical activity:     Days per week: Not on file     Minutes per session: Not on file    Stress: Not on file   Relationships    Social connections:     Talks on phone: Not on file     Gets together: Not on file     Attends Latter-day service: Not on file     Active member of club or organization: Not on file     Attends meetings of clubs or organizations: Not on file     Relationship status: Not on file   Other Topics Concern    Not on file   Social History Narrative    Not on file       Current Outpatient  Medications   Medication Sig Dispense Refill    fluticasone (FLONASE) 50 mcg/actuation nasal spray USE TWO SPRAYS IN EACH NOSTRIL ONCE DAILY 16 g 3    nebivolol (BYSTOLIC) 10 MG Tab Take 1 tablet (10 mg total) by mouth once daily. 30 tablet 1    amlodipine-benazepril (LOTREL) 10-40 mg per capsule Take 1 capsule by mouth once daily. 90 capsule 0     No current facility-administered medications for this visit.        Review of patient's allergies indicates:   Allergen Reactions    No known allergies        Physical examination  Vitals Reviewed  Gen. Well-dressed well-nourished   Skin warm dry and intact.  No rashes noted.  Chest.  Respirations are even unlabored.  Lungs are clear to auscultation.  Cardiac regular rate and rhythm.  No chest wall adenopathy noted.  Neuro. Awake alert oriented x4.  Normal judgment and cognition noted.  Extremities no clubbing cyanosis or edema noted.     Call or return to clinic prn if these symptoms worsen or fail to improve as anticipated.  In excessive of 25 minutes spent with patient with greater than 50% of time dedicated to education on symptoms, diagnosis, treatments, and coordination of care.

## 2020-01-15 NOTE — TELEPHONE ENCOUNTER
Pt will call back to schedule labs. Pharmacy does not have amlodipine-benazepril combo and they need medications sent in separately.

## 2020-03-20 ENCOUNTER — TELEPHONE (OUTPATIENT)
Dept: FAMILY MEDICINE | Facility: CLINIC | Age: 60
End: 2020-03-20

## 2020-03-20 NOTE — TELEPHONE ENCOUNTER
Called patient in regards to scheduing his appointment out 30 days or to changed to a virtual MyChart visit due to the COVID-19 concerns. Patient was LVM to return phone call to clinic to reschedule.

## 2020-03-23 NOTE — TELEPHONE ENCOUNTER
Left message x 2 for patient to call back in regards to appt scheduled on 03/25/2020. Appt needs to be rescheduled a month out due to COVID-19 concern.

## 2020-04-03 ENCOUNTER — TELEPHONE (OUTPATIENT)
Dept: FAMILY MEDICINE | Facility: CLINIC | Age: 60
End: 2020-04-03

## 2020-04-03 NOTE — TELEPHONE ENCOUNTER
----- Message from Gloria Welch sent at 4/3/2020  3:50 PM CDT -----  Contact: pt  Type: Needs Medical Advice    Who Called:  Cynthia  Symptoms (please be specific):    How long has patient had these symptoms:    Pharmacy name and phone #:    Best Call Back Number: [phone] (home)     Additional Information: pt's wife states this her second request today to inform provider  that [employer] will not authorize current medication bystolic, not covered need substitute, pt wife stated that he is on his last pill. pls call to advise

## 2020-04-03 NOTE — TELEPHONE ENCOUNTER
Pt notified that staff is working on PA for his bystolic and that he can ask his pharmacy for a temp supply until we get the approval. He expressed understanding.

## 2020-04-03 NOTE — TELEPHONE ENCOUNTER
----- Message from Amina Conley sent at 4/3/2020 12:09 PM CDT -----  Type: Needs Medical Advice    Who Called:  Shaneka (wife)  Best Call Back Number: [phone]  Additional Information: states that the patient's insurance does not cover nebivolol (BYSTOLIC) 10 MG Tab. Requesting alternative. Please give call back

## 2020-04-06 ENCOUNTER — TELEPHONE (OUTPATIENT)
Dept: FAMILY MEDICINE | Facility: CLINIC | Age: 60
End: 2020-04-06

## 2020-04-07 ENCOUNTER — PATIENT MESSAGE (OUTPATIENT)
Dept: FAMILY MEDICINE | Facility: CLINIC | Age: 60
End: 2020-04-07

## 2020-04-07 ENCOUNTER — OFFICE VISIT (OUTPATIENT)
Dept: FAMILY MEDICINE | Facility: CLINIC | Age: 60
End: 2020-04-07
Payer: COMMERCIAL

## 2020-04-07 DIAGNOSIS — I10 ESSENTIAL HYPERTENSION: Primary | ICD-10-CM

## 2020-04-07 DIAGNOSIS — J30.2 SEASONAL ALLERGIC RHINITIS, UNSPECIFIED TRIGGER: ICD-10-CM

## 2020-04-07 PROCEDURE — 99214 PR OFFICE/OUTPT VISIT, EST, LEVL IV, 30-39 MIN: ICD-10-PCS | Mod: 95,,, | Performed by: FAMILY MEDICINE

## 2020-04-07 PROCEDURE — 99214 OFFICE O/P EST MOD 30 MIN: CPT | Mod: 95,,, | Performed by: FAMILY MEDICINE

## 2020-04-07 RX ORDER — METOPROLOL SUCCINATE 50 MG/1
50 TABLET, EXTENDED RELEASE ORAL DAILY
Qty: 90 TABLET | Refills: 1 | Status: SHIPPED | OUTPATIENT
Start: 2020-04-07 | End: 2020-11-05

## 2020-04-07 RX ORDER — BENAZEPRIL HYDROCHLORIDE 40 MG/1
40 TABLET ORAL DAILY
Qty: 90 TABLET | Refills: 1 | Status: SHIPPED | OUTPATIENT
Start: 2020-04-07 | End: 2020-11-05

## 2020-04-07 RX ORDER — AMLODIPINE BESYLATE 10 MG/1
10 TABLET ORAL DAILY
Qty: 90 TABLET | Refills: 1 | Status: SHIPPED | OUTPATIENT
Start: 2020-04-07 | End: 2020-11-05

## 2020-04-07 RX ORDER — FLUTICASONE PROPIONATE 50 MCG
2 SPRAY, SUSPENSION (ML) NASAL DAILY
Qty: 16 G | Refills: 5 | Status: SHIPPED | OUTPATIENT
Start: 2020-04-07 | End: 2021-06-23 | Stop reason: SDUPTHER

## 2020-04-07 NOTE — TELEPHONE ENCOUNTER
----- Message from Edda Blanton sent at 4/7/2020 11:23 AM CDT -----    Type:  RX Refill Request    Who Called: pt wife talya SchneiderADDI Name and Strength:   Atenolol   Ins  Will cover this on   Preferred Pharmacy with phone number:    [id number] - [place], [place] - [address] [address]  Phone: [phone] Fax: [phone]  Best Call Back Number:  [phone]  Additional Information:   Metoprolol tarte   Is  Another  Med the  Ins  Will  Cover  , ins  Will  No  Longer  Cover Bystolic  Please call  Pt is  Out  Of med// pt  Has  Called  Two  A day   Since thursaustin  
----- Message from Wanda Zambrano MA sent at 4/6/2020  2:52 PM CDT -----  Contact: Cynthia Salmon is out of the Bystolic, and the insurance company does not cover this medication any longer.  The patient would like a comparable medication called in it's place      [id number]  [place] - [address]  [place] [address]  Phone: [phone] Fax: [phone]    Please return call:  [phone]   
Checking PA status with Kathie  
I have not seen this gentleman in 2 years and he did not connect for his scheduled visit a few weeks ago.  But I suppose I can still help him if he is agreeable.  He will need to verbally agree to me changing the medication and verbally agree to rescheduling his appointment and I will then do my best to help him.  I will not go with the insurance companies preferred alternative atenolol as this is an older, outdated drug not as affective and high level of side effects.  I will consider metoprolol which is not as preferred as Bystolic but a reasonable alternative.  
Pt's insurance is no longer covering bystolic, their approved alternative is atenolol  
VV scheduled with pt since he is returning to work tomorrow  
24-Mar-2019 23:14

## 2020-04-07 NOTE — PROGRESS NOTES
THIS DOCUMENT WAS MADE IN PART WITH VOICE RECOGNITION SOFTWARE.  OCCASIONALLY THIS SOFTWARE WILL MISINTERPRET WORDS OR PHRASES.      Rich CASTRO Nellie  1960    Diagnoses and all orders for this visit:    Essential hypertension  -     amLODIPine (NORVASC) 10 MG tablet; Take 1 tablet (10 mg total) by mouth once daily.  -     benazepriL (LOTENSIN) 40 MG tablet; Take 1 tablet (40 mg total) by mouth once daily.  -     metoprolol succinate (TOPROL-XL) 50 MG 24 hr tablet; Take 1 tablet (50 mg total) by mouth once daily.  Discontinue Bystolic and begin metoprolol.  Monitor blood pressure at home and let me know in a few weeks if any loss of control or other concerns.  If the transition works well then follow with me in 6 months.    Seasonal allergic rhinitis, unspecified trigger  -     fluticasone propionate (FLONASE) 50 mcg/actuation nasal spray; 2 sprays (100 mcg total) by Each Nostril route once daily.        Subjective     The patient is being seen by virtual visit because of the COVID19 pandemic and necessity to reduce risk of exposure.  The patient location is:  Patient Home   The chief complaint leading to consultation is:  Hypertension and request for medication change    Visit type: Virtual visit with synchronous audio and video  Total time spent with patient:  10 min  Each patient to whom he or she provides medical services by telemedicine is:  (1) informed of the relationship between the physician and patient and the respective role of any other health care provider with respect to management of the patient; and (2) notified that he or she may decline to receive medical services by telemedicine and may withdraw from such care at any time.      HPI  Hypertension, longstanding condition.  Appears to have improved and is currently satisfactory based on reported home readings.  Reviewed his last in clinic visit in January.  Although he just received a notice from his new insurance plan that they will no longer  cover Bystolic.  Alternatives include metoprolol and atenolol.  He does recall taking metoprolol a number of years ago.  He does not remember any side effects or issues.  This was changed due to preferred efficacy of Bystolic at the time.  He reports no acute symptoms or concerns.    He continues to work but is taking full precautions with regard to COVID-19.    He has had a mild flare-up of allergies.  Flonase generally works well but has not been refilled recently.    Active Ambulatory Problems     Diagnosis Date Noted    Corneal edema, unspecified 06/06/2011    Unspecified disorder of conjunctiva 06/05/2011    Hypertension     Hyperlipidemia     Screen for colon cancer 02/01/2017     Resolved Ambulatory Problems     Diagnosis Date Noted    No Resolved Ambulatory Problems     Past Medical History:   Diagnosis Date    Allergy          Review of Systems   Constitutional: Negative for activity change and fever.   HENT: Positive for congestion and rhinorrhea.    Respiratory: Negative.    Cardiovascular: Negative for chest pain and leg swelling.   Gastrointestinal: Negative.    Genitourinary: Negative.    Musculoskeletal: Negative.        Objective     Physical Exam   Constitutional: He is oriented to person, place, and time. He appears well-developed and well-nourished. He appears cachectic.  Non-toxic appearance. He does not have a sickly appearance. He does not appear ill. No distress.   He appears to be sitting comfortably on a recliner   HENT:   Head: Normocephalic and atraumatic.   Eyes: No scleral icterus.   Pulmonary/Chest: Effort normal. No accessory muscle usage. No tachypnea and no bradypnea. No respiratory distress.   Neurological: He is alert and oriented to person, place, and time.   Psychiatric: His behavior is normal. Thought content normal. His mood appears not anxious. His affect is not inappropriate. His speech is not rapid and/or pressured, not delayed, not tangential and not slurred. He is  not agitated, not hyperactive and not combative. Thought content is not delusional. He is attentive.     Physical exam limited as this was a virtual visit.  But it is apparent that the individual is in no acute distress, well groomed, well kept.  Speech was normal.  Patient is alert and oriented x3.  Mood and affect appear normal.    Patient reported blood pressure:  130/75  Patient reported pulse:  75  Respirations observed:  Roughly 18

## 2020-05-06 ENCOUNTER — PATIENT MESSAGE (OUTPATIENT)
Dept: ADMINISTRATIVE | Facility: HOSPITAL | Age: 60
End: 2020-05-06

## 2021-05-06 ENCOUNTER — PATIENT MESSAGE (OUTPATIENT)
Dept: RESEARCH | Facility: HOSPITAL | Age: 61
End: 2021-05-06

## 2021-05-10 ENCOUNTER — TELEPHONE (OUTPATIENT)
Dept: FAMILY MEDICINE | Facility: CLINIC | Age: 61
End: 2021-05-10

## 2021-05-10 DIAGNOSIS — I10 ESSENTIAL HYPERTENSION: ICD-10-CM

## 2021-05-11 RX ORDER — AMLODIPINE BESYLATE 10 MG/1
TABLET ORAL
Qty: 30 TABLET | Refills: 0 | Status: SHIPPED | OUTPATIENT
Start: 2021-05-11 | End: 2021-06-07 | Stop reason: SDUPTHER

## 2021-05-11 RX ORDER — BENAZEPRIL HYDROCHLORIDE 40 MG/1
TABLET ORAL
Qty: 30 TABLET | Refills: 0 | Status: SHIPPED | OUTPATIENT
Start: 2021-05-11 | End: 2021-06-07 | Stop reason: SDUPTHER

## 2021-05-11 RX ORDER — METOPROLOL SUCCINATE 50 MG/1
TABLET, EXTENDED RELEASE ORAL
Qty: 30 TABLET | Refills: 0 | Status: SHIPPED | OUTPATIENT
Start: 2021-05-11 | End: 2021-06-07 | Stop reason: SDUPTHER

## 2021-06-07 DIAGNOSIS — I10 ESSENTIAL HYPERTENSION: ICD-10-CM

## 2021-06-07 RX ORDER — AMLODIPINE BESYLATE 10 MG/1
10 TABLET ORAL DAILY
Qty: 30 TABLET | Refills: 0 | Status: SHIPPED | OUTPATIENT
Start: 2021-06-07 | End: 2021-06-23 | Stop reason: SDUPTHER

## 2021-06-07 RX ORDER — BENAZEPRIL HYDROCHLORIDE 40 MG/1
40 TABLET ORAL DAILY
Qty: 30 TABLET | Refills: 0 | Status: SHIPPED | OUTPATIENT
Start: 2021-06-07 | End: 2021-06-23 | Stop reason: SDUPTHER

## 2021-06-07 RX ORDER — METOPROLOL SUCCINATE 50 MG/1
50 TABLET, EXTENDED RELEASE ORAL DAILY
Qty: 30 TABLET | Refills: 0 | Status: SHIPPED | OUTPATIENT
Start: 2021-06-07 | End: 2021-06-23 | Stop reason: SDUPTHER

## 2021-06-23 ENCOUNTER — OFFICE VISIT (OUTPATIENT)
Dept: FAMILY MEDICINE | Facility: CLINIC | Age: 61
End: 2021-06-23
Payer: COMMERCIAL

## 2021-06-23 VITALS
TEMPERATURE: 99 F | SYSTOLIC BLOOD PRESSURE: 124 MMHG | RESPIRATION RATE: 16 BRPM | OXYGEN SATURATION: 96 % | WEIGHT: 242.31 LBS | HEART RATE: 105 BPM | DIASTOLIC BLOOD PRESSURE: 82 MMHG | HEIGHT: 66 IN | BODY MASS INDEX: 38.94 KG/M2

## 2021-06-23 DIAGNOSIS — E66.01 SEVERE OBESITY WITH BODY MASS INDEX (BMI) OF 35.0 TO 39.9 WITH COMORBIDITY: ICD-10-CM

## 2021-06-23 DIAGNOSIS — I10 ESSENTIAL HYPERTENSION: Primary | ICD-10-CM

## 2021-06-23 DIAGNOSIS — Z12.5 ENCOUNTER FOR PROSTATE CANCER SCREENING: ICD-10-CM

## 2021-06-23 DIAGNOSIS — E78.5 HYPERLIPIDEMIA, UNSPECIFIED HYPERLIPIDEMIA TYPE: ICD-10-CM

## 2021-06-23 DIAGNOSIS — Z11.59 ENCOUNTER FOR HCV SCREENING TEST FOR LOW RISK PATIENT: ICD-10-CM

## 2021-06-23 DIAGNOSIS — J30.2 SEASONAL ALLERGIC RHINITIS, UNSPECIFIED TRIGGER: ICD-10-CM

## 2021-06-23 DIAGNOSIS — Z12.11 COLON CANCER SCREENING: ICD-10-CM

## 2021-06-23 DIAGNOSIS — F17.219 NICOTINE DEPENDENCE, CIGARETTES, WITH UNSPECIFIED NICOTINE-INDUCED DISORDERS: ICD-10-CM

## 2021-06-23 PROCEDURE — 99407 BEHAV CHNG SMOKING > 10 MIN: CPT | Mod: S$GLB,,, | Performed by: STUDENT IN AN ORGANIZED HEALTH CARE EDUCATION/TRAINING PROGRAM

## 2021-06-23 PROCEDURE — 99407 PR TOBACCO USE CESSATION INTENSIVE >10 MINUTES: ICD-10-PCS | Mod: S$GLB,,, | Performed by: STUDENT IN AN ORGANIZED HEALTH CARE EDUCATION/TRAINING PROGRAM

## 2021-06-23 PROCEDURE — 3074F PR MOST RECENT SYSTOLIC BLOOD PRESSURE < 130 MM HG: ICD-10-PCS | Mod: CPTII,S$GLB,, | Performed by: STUDENT IN AN ORGANIZED HEALTH CARE EDUCATION/TRAINING PROGRAM

## 2021-06-23 PROCEDURE — 99396 PR PREVENTIVE VISIT,EST,40-64: ICD-10-PCS | Mod: S$GLB,,, | Performed by: STUDENT IN AN ORGANIZED HEALTH CARE EDUCATION/TRAINING PROGRAM

## 2021-06-23 PROCEDURE — 99999 PR PBB SHADOW E&M-EST. PATIENT-LVL V: ICD-10-PCS | Mod: PBBFAC,,, | Performed by: STUDENT IN AN ORGANIZED HEALTH CARE EDUCATION/TRAINING PROGRAM

## 2021-06-23 PROCEDURE — 3008F PR BODY MASS INDEX (BMI) DOCUMENTED: ICD-10-PCS | Mod: CPTII,S$GLB,, | Performed by: STUDENT IN AN ORGANIZED HEALTH CARE EDUCATION/TRAINING PROGRAM

## 2021-06-23 PROCEDURE — 99999 PR PBB SHADOW E&M-EST. PATIENT-LVL V: CPT | Mod: PBBFAC,,, | Performed by: STUDENT IN AN ORGANIZED HEALTH CARE EDUCATION/TRAINING PROGRAM

## 2021-06-23 PROCEDURE — 3079F PR MOST RECENT DIASTOLIC BLOOD PRESSURE 80-89 MM HG: ICD-10-PCS | Mod: CPTII,S$GLB,, | Performed by: STUDENT IN AN ORGANIZED HEALTH CARE EDUCATION/TRAINING PROGRAM

## 2021-06-23 PROCEDURE — 99396 PREV VISIT EST AGE 40-64: CPT | Mod: S$GLB,,, | Performed by: STUDENT IN AN ORGANIZED HEALTH CARE EDUCATION/TRAINING PROGRAM

## 2021-06-23 PROCEDURE — 1126F AMNT PAIN NOTED NONE PRSNT: CPT | Mod: S$GLB,,, | Performed by: STUDENT IN AN ORGANIZED HEALTH CARE EDUCATION/TRAINING PROGRAM

## 2021-06-23 PROCEDURE — 3079F DIAST BP 80-89 MM HG: CPT | Mod: CPTII,S$GLB,, | Performed by: STUDENT IN AN ORGANIZED HEALTH CARE EDUCATION/TRAINING PROGRAM

## 2021-06-23 PROCEDURE — 3074F SYST BP LT 130 MM HG: CPT | Mod: CPTII,S$GLB,, | Performed by: STUDENT IN AN ORGANIZED HEALTH CARE EDUCATION/TRAINING PROGRAM

## 2021-06-23 PROCEDURE — 1126F PR PAIN SEVERITY QUANTIFIED, NO PAIN PRESENT: ICD-10-PCS | Mod: S$GLB,,, | Performed by: STUDENT IN AN ORGANIZED HEALTH CARE EDUCATION/TRAINING PROGRAM

## 2021-06-23 PROCEDURE — 3008F BODY MASS INDEX DOCD: CPT | Mod: CPTII,S$GLB,, | Performed by: STUDENT IN AN ORGANIZED HEALTH CARE EDUCATION/TRAINING PROGRAM

## 2021-06-23 RX ORDER — METOPROLOL SUCCINATE 50 MG/1
50 TABLET, EXTENDED RELEASE ORAL NIGHTLY
Qty: 90 TABLET | Refills: 0 | Status: SHIPPED | OUTPATIENT
Start: 2021-06-23 | End: 2021-11-11

## 2021-06-23 RX ORDER — BENAZEPRIL HYDROCHLORIDE 40 MG/1
40 TABLET ORAL NIGHTLY
Qty: 90 TABLET | Refills: 0 | Status: SHIPPED | OUTPATIENT
Start: 2021-06-23 | End: 2021-10-13

## 2021-06-23 RX ORDER — FLUTICASONE PROPIONATE 50 MCG
2 SPRAY, SUSPENSION (ML) NASAL DAILY
Qty: 16 G | Refills: 5 | Status: SHIPPED | OUTPATIENT
Start: 2021-06-23 | End: 2023-06-19 | Stop reason: SDUPTHER

## 2021-06-23 RX ORDER — AMLODIPINE BESYLATE 10 MG/1
10 TABLET ORAL NIGHTLY
Qty: 90 TABLET | Refills: 0 | Status: SHIPPED | OUTPATIENT
Start: 2021-06-23 | End: 2021-10-13

## 2021-06-24 ENCOUNTER — PATIENT MESSAGE (OUTPATIENT)
Dept: GASTROENTEROLOGY | Facility: CLINIC | Age: 61
End: 2021-06-24

## 2021-07-07 ENCOUNTER — LAB VISIT (OUTPATIENT)
Dept: LAB | Facility: HOSPITAL | Age: 61
End: 2021-07-07
Attending: STUDENT IN AN ORGANIZED HEALTH CARE EDUCATION/TRAINING PROGRAM
Payer: COMMERCIAL

## 2021-07-07 DIAGNOSIS — E78.5 HYPERLIPIDEMIA, UNSPECIFIED HYPERLIPIDEMIA TYPE: ICD-10-CM

## 2021-07-07 DIAGNOSIS — Z11.59 ENCOUNTER FOR HCV SCREENING TEST FOR LOW RISK PATIENT: ICD-10-CM

## 2021-07-07 DIAGNOSIS — Z12.5 ENCOUNTER FOR PROSTATE CANCER SCREENING: ICD-10-CM

## 2021-07-07 DIAGNOSIS — I10 ESSENTIAL HYPERTENSION: ICD-10-CM

## 2021-07-07 LAB
ALBUMIN SERPL BCP-MCNC: 3.9 G/DL (ref 3.5–5.2)
ALP SERPL-CCNC: 62 U/L (ref 55–135)
ALT SERPL W/O P-5'-P-CCNC: 34 U/L (ref 10–44)
ANION GAP SERPL CALC-SCNC: 6 MMOL/L (ref 8–16)
AST SERPL-CCNC: 20 U/L (ref 10–40)
BASOPHILS # BLD AUTO: 0.03 K/UL (ref 0–0.2)
BASOPHILS NFR BLD: 0.5 % (ref 0–1.9)
BILIRUB SERPL-MCNC: 0.4 MG/DL (ref 0.1–1)
BUN SERPL-MCNC: 14 MG/DL (ref 6–20)
CALCIUM SERPL-MCNC: 9.2 MG/DL (ref 8.7–10.5)
CHLORIDE SERPL-SCNC: 106 MMOL/L (ref 95–110)
CHOLEST SERPL-MCNC: 217 MG/DL (ref 120–199)
CHOLEST/HDLC SERPL: 5.6 {RATIO} (ref 2–5)
CO2 SERPL-SCNC: 27 MMOL/L (ref 23–29)
COMPLEXED PSA SERPL-MCNC: 3.4 NG/ML (ref 0–4)
CREAT SERPL-MCNC: 0.8 MG/DL (ref 0.5–1.4)
DIFFERENTIAL METHOD: ABNORMAL
EOSINOPHIL # BLD AUTO: 0.2 K/UL (ref 0–0.5)
EOSINOPHIL NFR BLD: 2.9 % (ref 0–8)
ERYTHROCYTE [DISTWIDTH] IN BLOOD BY AUTOMATED COUNT: 12.8 % (ref 11.5–14.5)
EST. GFR  (AFRICAN AMERICAN): >60 ML/MIN/1.73 M^2
EST. GFR  (NON AFRICAN AMERICAN): >60 ML/MIN/1.73 M^2
GLUCOSE SERPL-MCNC: 115 MG/DL (ref 70–110)
HCT VFR BLD AUTO: 50.7 % (ref 40–54)
HCV AB SERPL QL IA: NEGATIVE
HDLC SERPL-MCNC: 39 MG/DL (ref 40–75)
HDLC SERPL: 18 % (ref 20–50)
HGB BLD-MCNC: 15.7 G/DL (ref 14–18)
IMM GRANULOCYTES # BLD AUTO: 0.03 K/UL (ref 0–0.04)
IMM GRANULOCYTES NFR BLD AUTO: 0.5 % (ref 0–0.5)
LDLC SERPL CALC-MCNC: 156 MG/DL (ref 63–159)
LYMPHOCYTES # BLD AUTO: 2.1 K/UL (ref 1–4.8)
LYMPHOCYTES NFR BLD: 31.6 % (ref 18–48)
MCH RBC QN AUTO: 29.8 PG (ref 27–31)
MCHC RBC AUTO-ENTMCNC: 31 G/DL (ref 32–36)
MCV RBC AUTO: 96 FL (ref 82–98)
MONOCYTES # BLD AUTO: 0.6 K/UL (ref 0.3–1)
MONOCYTES NFR BLD: 9.5 % (ref 4–15)
NEUTROPHILS # BLD AUTO: 3.6 K/UL (ref 1.8–7.7)
NEUTROPHILS NFR BLD: 55 % (ref 38–73)
NONHDLC SERPL-MCNC: 178 MG/DL
NRBC BLD-RTO: 0 /100 WBC
PLATELET # BLD AUTO: 267 K/UL (ref 150–450)
PMV BLD AUTO: 9.5 FL (ref 9.2–12.9)
POTASSIUM SERPL-SCNC: 4.9 MMOL/L (ref 3.5–5.1)
PROT SERPL-MCNC: 7 G/DL (ref 6–8.4)
RBC # BLD AUTO: 5.26 M/UL (ref 4.6–6.2)
SODIUM SERPL-SCNC: 139 MMOL/L (ref 136–145)
TRIGL SERPL-MCNC: 110 MG/DL (ref 30–150)
WBC # BLD AUTO: 6.56 K/UL (ref 3.9–12.7)

## 2021-07-07 PROCEDURE — 86803 HEPATITIS C AB TEST: CPT | Performed by: STUDENT IN AN ORGANIZED HEALTH CARE EDUCATION/TRAINING PROGRAM

## 2021-07-07 PROCEDURE — 85025 COMPLETE CBC W/AUTO DIFF WBC: CPT | Performed by: STUDENT IN AN ORGANIZED HEALTH CARE EDUCATION/TRAINING PROGRAM

## 2021-07-07 PROCEDURE — 84153 ASSAY OF PSA TOTAL: CPT | Performed by: STUDENT IN AN ORGANIZED HEALTH CARE EDUCATION/TRAINING PROGRAM

## 2021-07-07 PROCEDURE — 36415 COLL VENOUS BLD VENIPUNCTURE: CPT | Mod: PO | Performed by: STUDENT IN AN ORGANIZED HEALTH CARE EDUCATION/TRAINING PROGRAM

## 2021-07-07 PROCEDURE — 80053 COMPREHEN METABOLIC PANEL: CPT | Performed by: STUDENT IN AN ORGANIZED HEALTH CARE EDUCATION/TRAINING PROGRAM

## 2021-07-07 PROCEDURE — 80061 LIPID PANEL: CPT | Performed by: STUDENT IN AN ORGANIZED HEALTH CARE EDUCATION/TRAINING PROGRAM

## 2021-10-12 DIAGNOSIS — I10 ESSENTIAL HYPERTENSION: ICD-10-CM

## 2021-10-13 RX ORDER — BENAZEPRIL HYDROCHLORIDE 40 MG/1
TABLET ORAL
Qty: 90 TABLET | Refills: 1 | Status: SHIPPED | OUTPATIENT
Start: 2021-10-13 | End: 2022-04-01

## 2021-10-13 RX ORDER — AMLODIPINE BESYLATE 10 MG/1
TABLET ORAL
Qty: 90 TABLET | Refills: 1 | Status: SHIPPED | OUTPATIENT
Start: 2021-10-13 | End: 2022-04-04 | Stop reason: SDUPTHER

## 2021-11-11 DIAGNOSIS — I10 ESSENTIAL HYPERTENSION: ICD-10-CM

## 2021-11-11 RX ORDER — METOPROLOL SUCCINATE 50 MG/1
TABLET, EXTENDED RELEASE ORAL
Qty: 90 TABLET | Refills: 2 | Status: SHIPPED | OUTPATIENT
Start: 2021-11-11 | End: 2022-01-18

## 2022-01-14 ENCOUNTER — TELEPHONE (OUTPATIENT)
Dept: CARDIOLOGY | Facility: CLINIC | Age: 62
End: 2022-01-14
Payer: COMMERCIAL

## 2022-01-14 ENCOUNTER — OFFICE VISIT (OUTPATIENT)
Dept: FAMILY MEDICINE | Facility: CLINIC | Age: 62
End: 2022-01-14
Payer: COMMERCIAL

## 2022-01-14 VITALS
BODY MASS INDEX: 33.24 KG/M2 | HEART RATE: 102 BPM | TEMPERATURE: 98 F | OXYGEN SATURATION: 96 % | SYSTOLIC BLOOD PRESSURE: 120 MMHG | HEIGHT: 66 IN | WEIGHT: 206.81 LBS | DIASTOLIC BLOOD PRESSURE: 80 MMHG

## 2022-01-14 DIAGNOSIS — R00.0 TACHYCARDIA: ICD-10-CM

## 2022-01-14 DIAGNOSIS — I49.9 IRREGULARLY IRREGULAR PULSE RHYTHM: ICD-10-CM

## 2022-01-14 DIAGNOSIS — E66.01 SEVERE OBESITY WITH BODY MASS INDEX (BMI) OF 35.0 TO 39.9 WITH COMORBIDITY: ICD-10-CM

## 2022-01-14 DIAGNOSIS — Z02.89 ENCOUNTER FOR PHYSICAL EXAMINATION RELATED TO EMPLOYMENT: Primary | ICD-10-CM

## 2022-01-14 DIAGNOSIS — I48.91 ATRIAL FIBRILLATION, NEW ONSET: ICD-10-CM

## 2022-01-14 DIAGNOSIS — I10 PRIMARY HYPERTENSION: ICD-10-CM

## 2022-01-14 DIAGNOSIS — E78.2 MIXED HYPERLIPIDEMIA: ICD-10-CM

## 2022-01-14 PROCEDURE — 93010 EKG 12-LEAD: ICD-10-PCS | Mod: S$PBB,,, | Performed by: INTERNAL MEDICINE

## 2022-01-14 PROCEDURE — 99214 PR OFFICE/OUTPT VISIT, EST, LEVL IV, 30-39 MIN: ICD-10-PCS | Mod: S$GLB,,, | Performed by: PHYSICIAN ASSISTANT

## 2022-01-14 PROCEDURE — 3074F PR MOST RECENT SYSTOLIC BLOOD PRESSURE < 130 MM HG: ICD-10-PCS | Mod: CPTII,S$GLB,, | Performed by: PHYSICIAN ASSISTANT

## 2022-01-14 PROCEDURE — 3008F BODY MASS INDEX DOCD: CPT | Mod: CPTII,S$GLB,, | Performed by: PHYSICIAN ASSISTANT

## 2022-01-14 PROCEDURE — 1160F RVW MEDS BY RX/DR IN RCRD: CPT | Mod: CPTII,S$GLB,, | Performed by: PHYSICIAN ASSISTANT

## 2022-01-14 PROCEDURE — 99999 PR PBB SHADOW E&M-EST. PATIENT-LVL V: CPT | Mod: PBBFAC,,, | Performed by: PHYSICIAN ASSISTANT

## 2022-01-14 PROCEDURE — 1160F PR REVIEW ALL MEDS BY PRESCRIBER/CLIN PHARMACIST DOCUMENTED: ICD-10-PCS | Mod: CPTII,S$GLB,, | Performed by: PHYSICIAN ASSISTANT

## 2022-01-14 PROCEDURE — 93010 ELECTROCARDIOGRAM REPORT: CPT | Mod: S$PBB,,, | Performed by: INTERNAL MEDICINE

## 2022-01-14 PROCEDURE — 3079F PR MOST RECENT DIASTOLIC BLOOD PRESSURE 80-89 MM HG: ICD-10-PCS | Mod: CPTII,S$GLB,, | Performed by: PHYSICIAN ASSISTANT

## 2022-01-14 PROCEDURE — 99214 OFFICE O/P EST MOD 30 MIN: CPT | Mod: S$GLB,,, | Performed by: PHYSICIAN ASSISTANT

## 2022-01-14 PROCEDURE — 1159F MED LIST DOCD IN RCRD: CPT | Mod: CPTII,S$GLB,, | Performed by: PHYSICIAN ASSISTANT

## 2022-01-14 PROCEDURE — 1159F PR MEDICATION LIST DOCUMENTED IN MEDICAL RECORD: ICD-10-PCS | Mod: CPTII,S$GLB,, | Performed by: PHYSICIAN ASSISTANT

## 2022-01-14 PROCEDURE — 93005 ELECTROCARDIOGRAM TRACING: CPT | Mod: PBBFAC,PO | Performed by: INTERNAL MEDICINE

## 2022-01-14 PROCEDURE — 3074F SYST BP LT 130 MM HG: CPT | Mod: CPTII,S$GLB,, | Performed by: PHYSICIAN ASSISTANT

## 2022-01-14 PROCEDURE — 99999 PR PBB SHADOW E&M-EST. PATIENT-LVL V: ICD-10-PCS | Mod: PBBFAC,,, | Performed by: PHYSICIAN ASSISTANT

## 2022-01-14 PROCEDURE — 3079F DIAST BP 80-89 MM HG: CPT | Mod: CPTII,S$GLB,, | Performed by: PHYSICIAN ASSISTANT

## 2022-01-14 PROCEDURE — 3008F PR BODY MASS INDEX (BMI) DOCUMENTED: ICD-10-PCS | Mod: CPTII,S$GLB,, | Performed by: PHYSICIAN ASSISTANT

## 2022-01-14 NOTE — TELEPHONE ENCOUNTER
----- Message from Ashley Zepeda sent at 1/14/2022  1:45 PM CST -----  Regarding: sooner appt  Contact: yara  Type:  Sooner Apoointment Request    Caller is requesting a sooner appointment.  Caller declined first available appointment listed below.  Caller will not accept being placed on the waitlist and is requesting a message be sent to doctor.    Name of Caller:  nurse livingston  When is the first available appointment?  Schedule 1/18  Symptoms:  a fib  Best Call Back Number:  [phone] ext [phone]    Additional Information:  asking for sooner appt

## 2022-01-14 NOTE — TELEPHONE ENCOUNTER
There are no sooner appts. Dr sousa only works the 16th-31st of every month. Also today is Friday the 14th and we are closed Monday the 17th, so the 18th is the 1st appt. Tried calling nurse & no answer

## 2022-01-14 NOTE — TELEPHONE ENCOUNTER
----- Message from Cindy Zepeda sent at 1/14/2022  1:06 PM CST -----  Contact: pt  Type: Needs Medical Advice  Who Called:  pt   Symptoms (please be specific):  Jerry  Best Call Back Number: [phone]    Additional Information: please call pt, states his appt was suppose to be today

## 2022-01-17 NOTE — PROGRESS NOTES
Subjective:       Patient ID: Rich Ballard is a 61 y.o. male.    Chief Complaint: Annual Exam (physical)    HPI   Pt. Needs [employer] for   Pt. Feels well  No complaints  Review of Systems   Constitutional: Negative.  Negative for activity change, appetite change, chills, diaphoresis, fatigue, fever and unexpected weight change.   HENT: Negative.    Eyes: Negative.    Respiratory: Negative.  Negative for cough and shortness of breath.    Cardiovascular: Negative.  Negative for chest pain and leg swelling.   Gastrointestinal: Negative.    Endocrine: Negative.    Genitourinary: Negative.    Musculoskeletal: Negative.    Integumentary:  Negative for rash. Negative.   Neurological: Negative.    Psychiatric/Behavioral: Negative.          Objective:      Physical Exam  Vitals reviewed.   Constitutional:       General: He is not in acute distress.     Appearance: Normal appearance. He is obese. He is not ill-appearing, toxic-appearing or diaphoretic.   HENT:      Head: Normocephalic and atraumatic.      Right Ear: Tympanic membrane, ear canal and external ear normal. There is no impacted cerumen.      Left Ear: Tympanic membrane, ear canal and external ear normal. There is no impacted cerumen.      Ears:      Comments: wispier test normal     Nose: Nose normal.      Mouth/Throat:      Mouth: Mucous membranes are moist.      Pharynx: Oropharynx is clear. No oropharyngeal exudate or posterior oropharyngeal erythema.   Eyes:      General: No scleral icterus.        Right eye: No discharge.         Left eye: No discharge.      Extraocular Movements: Extraocular movements intact.      Conjunctiva/sclera: Conjunctivae normal.      Pupils: Pupils are equal, round, and reactive to light.      Comments: [facility]  20/25 both eyes without correction  Color vision normal   Neck:      Vascular: No carotid bruit.   Cardiovascular:      Rate and Rhythm: Tachycardia present. Rhythm irregular.      Pulses: Normal  pulses.      Heart sounds: Normal heart sounds. No murmur heard.  No friction rub. No gallop.    Pulmonary:      Effort: Pulmonary effort is normal. No respiratory distress.      Breath sounds: Normal breath sounds. No stridor. No wheezing, rhonchi or rales.   Abdominal:      General: Abdomen is flat. Bowel sounds are normal. There is no distension.      Palpations: Abdomen is soft. There is no mass.      Tenderness: There is no abdominal tenderness. There is no right CVA tenderness, left CVA tenderness, guarding or rebound.      Hernia: No hernia is present.   Musculoskeletal:         General: No swelling, tenderness, deformity or signs of injury. Normal range of motion.      Cervical back: Normal range of motion and neck supple. No rigidity or tenderness.      Right lower leg: No edema.      Left lower leg: No edema.      Comments: Normal ROM and strength all joints   Lymphadenopathy:      Cervical: No cervical adenopathy.   Skin:     General: Skin is warm and dry.      Findings: No rash.   Neurological:      General: No focal deficit present.      Mental Status: He is alert and oriented to person, place, and time.      Cranial Nerves: No cranial nerve deficit.      Sensory: No sensory deficit.      Motor: No weakness.      Coordination: Coordination normal.      Gait: Gait normal.      Deep Tendon Reflexes: Reflexes normal.   Psychiatric:         Mood and Affect: Mood normal.         Behavior: Behavior normal.         Thought Content: Thought content normal.         Judgment: Judgment normal.         Assessment:       Problem List Items Addressed This Visit     Hypertension    Hyperlipidemia    Severe obesity with body mass index (BMI) of 35.0 to 39.9 with comorbidity    Tachycardia    Relevant Orders    EKG 12-lead    Irregularly irregular pulse rhythm    Relevant Orders    EKG 12-lead    Atrial fibrillation, new onset    Relevant Medications    apixaban (ELIQUIS) 5 mg Tab    Other Relevant Orders    Ambulatory  referral/consult to Cardiology      Other Visit Diagnoses     Encounter for physical examination related to employment    -  Primary          Plan:       Rich was seen today for annual exam.    Diagnoses and all orders for this visit:    Encounter for physical examination related to employment    Mixed hyperlipidemia    Primary hypertension    Severe obesity with body mass index (BMI) of 35.0 to 39.9 with comorbidity    Tachycardia  -     EKG 12-lead; Future  -     EKG 12-lead    Irregularly irregular pulse rhythm  -     EKG 12-lead; Future  -     EKG 12-lead    Atrial fibrillation, new onset  -     Ambulatory referral/consult to Cardiology; Future  -     apixaban (ELIQUIS) 5 mg Tab; Take 1 tablet (5 mg total) by mouth once daily.    EKG today confirms new onset A fib  Will wait on Cardiology clearance before completion of exam paperwork

## 2022-01-18 ENCOUNTER — OFFICE VISIT (OUTPATIENT)
Dept: CARDIOLOGY | Facility: CLINIC | Age: 62
End: 2022-01-18
Payer: COMMERCIAL

## 2022-01-18 VITALS
WEIGHT: 208 LBS | SYSTOLIC BLOOD PRESSURE: 122 MMHG | HEART RATE: 89 BPM | HEIGHT: 66 IN | OXYGEN SATURATION: 95 % | BODY MASS INDEX: 33.43 KG/M2 | DIASTOLIC BLOOD PRESSURE: 70 MMHG

## 2022-01-18 DIAGNOSIS — E06.9 HYPERTHYROIDITIS: ICD-10-CM

## 2022-01-18 DIAGNOSIS — I25.10 ASCVD (ARTERIOSCLEROTIC CARDIOVASCULAR DISEASE): Primary | ICD-10-CM

## 2022-01-18 DIAGNOSIS — E66.9 BMI (BODY MASS INDEX) PEDIATRIC, > 99% FOR AGE, OBESE CHILD, TERTIARY CARE INTERVENTION: ICD-10-CM

## 2022-01-18 DIAGNOSIS — I48.91 ATRIAL FIBRILLATION, NEW ONSET: ICD-10-CM

## 2022-01-18 DIAGNOSIS — I10 ESSENTIAL HYPERTENSION: ICD-10-CM

## 2022-01-18 DIAGNOSIS — G47.33 OSA (OBSTRUCTIVE SLEEP APNEA): ICD-10-CM

## 2022-01-18 PROCEDURE — 99205 OFFICE O/P NEW HI 60 MIN: CPT | Mod: S$GLB,,, | Performed by: SPECIALIST

## 2022-01-18 PROCEDURE — 99205 PR OFFICE/OUTPT VISIT, NEW, LEVL V, 60-74 MIN: ICD-10-PCS | Mod: S$GLB,,, | Performed by: SPECIALIST

## 2022-01-18 RX ORDER — METOPROLOL SUCCINATE 50 MG/1
50 TABLET, EXTENDED RELEASE ORAL 2 TIMES DAILY
Qty: 90 TABLET | Refills: 2 | Status: ON HOLD | OUTPATIENT
Start: 2022-01-18 | End: 2022-02-17 | Stop reason: HOSPADM

## 2022-01-18 NOTE — PROGRESS NOTES
Subjective:    Patient ID:  Rich Ballard is a 61 y.o. male who presents for   Atrial Fibrillation    Past fri  On  CG physical  Found afib - no symptoms    Fluttering    some pressure no mi no stents no recent est    smokes 1/2 ppd  No janet ( had uvelectomy )      @)  On  Amlodopine/ bezazepril and met   Hi bp since 2000     +4 hd family   Of the patient does state that occasionally when he exerts himself he has chest pressure but it goes away quickly  There is strong family history of heart disease  Obstructive sleep apnea symptoms got better with the uvulectomy  He does have high blood pressure      Review of patient's allergies indicates:   Allergen Reactions    No known allergies        Past Medical History:   Diagnosis Date    Allergy     Atrial fibrillation     Hyperlipidemia     Hypertension      Past Surgical History:   Procedure Laterality Date    APPENDECTOMY      CERVICAL FUSION      COLONOSCOPY N/A 2/1/2017    Procedure: COLONOSCOPY;  Surgeon: Kennedy Soto MD;  Location: [facility];  Service: Endoscopy;  Laterality: N/A;    ppp      ROTATOR CUFF REPAIR      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       Social History     Tobacco Use    Smoking status: Former Smoker     Packs/day: 0.50     Types: Cigarettes    Smokeless tobacco: Never Used   Substance Use Topics    Alcohol use: No     Comment: very rarely    Drug use: No        Review of Systems     Review of Systems   Constitutional: Positive for weight loss. Negative for decreased appetite, malaise/fatigue and weight gain.        + covid vaccine   Lost 40 lbs      HENT: Negative for congestion, hearing loss and tinnitus.    Eyes: Negative for blurred vision, vision loss in left eye, vision loss in right eye, visual disturbance and visual halos.   Cardiovascular: Positive for chest pain, irregular heartbeat and syncope. Negative for claudication, dyspnea on exertion, leg swelling, near-syncope, orthopnea, palpitations and paroxysmal nocturnal  dyspnea.   Respiratory: Positive for shortness of breath. Negative for cough, hemoptysis, sleep disturbances due to breathing, snoring, sputum production and wheezing.    Endocrine: Negative.  Negative for polydipsia, polyphagia and polyuria.   Hematologic/Lymphatic: Negative for adenopathy. Does not bruise/bleed easily.   Skin: Negative for dry skin, itching, poor wound healing, rash, skin cancer and suspicious lesions.   Musculoskeletal: Negative for arthritis, back pain, falls, gout, joint pain, joint swelling, muscle cramps, muscle weakness, neck pain and stiffness.   Gastrointestinal: Negative for abdominal pain, change in bowel habit, constipation, diarrhea, heartburn, hematemesis, hemorrhoids, melena, nausea and vomiting.   Genitourinary: Negative for bladder incontinence, dysuria, flank pain, frequency, hematuria, nocturia and non-menstrual bleeding.        Old renal stone s no bph   + ed    Neurological: Negative for brief paralysis, difficulty with concentration, disturbances in coordination, dizziness, focal weakness, headaches, loss of balance, numbness, paresthesias and tremors.   Psychiatric/Behavioral: Negative for altered mental status, depression, hallucinations, memory loss, substance abuse, suicidal ideas and thoughts of violence. The patient does not have insomnia and is not nervous/anxious.    Allergic/Immunologic: Negative for environmental allergies and hives.           Objective:        Vitals:    01/18/22 1625   BP: 122/70   Pulse: 89       Lab Results   Component Value Date    WBC 6.56 07/07/2021    HGB 15.7 07/07/2021    HCT 50.7 07/07/2021     07/07/2021    CHOL 217 (H) 07/07/2021    TRIG 110 07/07/2021    HDL 39 (L) 07/07/2021    ALT 34 07/07/2021    AST 20 07/07/2021     07/07/2021    K 4.9 07/07/2021     07/07/2021    CREATININE 0.8 07/07/2021    BUN 14 07/07/2021    CO2 27 07/07/2021    TSH 1.183 12/23/2016    PSA 3.4 07/07/2021        ECHOCARDIOGRAM RESULTS  No  results found for this or any previous visit.      CURRENT/PREVIOUS VISIT EKG  No results found for this or any previous visit.  No results found for this or any previous visit.    No results found for this or any previous visit.      PHYSICAL EXAM    Physical Exam of blood pressure is 150/85  No bruit  Lungs are clear  Cardiac irregular irreg   Abdomen slightly obese  Extremities mild edema  Of good pulses    Medication List with Changes/Refills   Current Medications    AMLODIPINE (NORVASC) 10 MG TABLET    TAKE 1 TABLET BY MOUTH ONCE DAILY IN THE EVENING    APIXABAN (ELIQUIS) 5 MG TAB    Take 1 tablet (5 mg total) by mouth once daily.    BENAZEPRIL (LOTENSIN) 40 MG TABLET    TAKE 1 TABLET BY MOUTH ONCE DAILY IN THE EVENING    FLUTICASONE PROPIONATE (FLONASE) 50 MCG/ACTUATION NASAL SPRAY    2 sprays (100 mcg total) by Each Nostril route once daily.    METOPROLOL SUCCINATE (TOPROL-XL) 50 MG 24 HR TABLET    Take 1 tablet by mouth in the evening           Assessment:     status post uvulectomy for obstructive sleep apnea; 40 lb weight loss  Chest pain  1. Atrial fibrillation, new onset         Plan:   Full workup including stress Holter and echo and Full lab in thyroid  Return to clinic next week.  Stay on Eliquis  Increase metoprolol to b.i.d.  GEOVANNI cardioversion next week    Problem List Items Addressed This Visit        Cardiac/Vascular    Atrial fibrillation, new onset           No follow-ups on file.

## 2022-01-19 ENCOUNTER — TELEPHONE (OUTPATIENT)
Dept: CARDIOLOGY | Facility: CLINIC | Age: 62
End: 2022-01-19
Payer: COMMERCIAL

## 2022-01-19 DIAGNOSIS — I48.91 ATRIAL FIBRILLATION, NEW ONSET: Primary | ICD-10-CM

## 2022-01-19 NOTE — TELEPHONE ENCOUNTER
----- Message from Jared Cardenas sent at 1/19/2022 11:37 AM CST -----  I see you just got the request to schedule his upcoming test.  Wife called she's trying to handle everything for him and ask if she can be called with the Dates and times as he can not hear well.  Her name is Cynthia and number is [phone].   Please advise -- Thank you

## 2022-01-19 NOTE — TELEPHONE ENCOUNTER
----- Message from Carlota Contreras sent at 1/19/2022 11:23 AM CST -----  Patient has orders in workqueue for a nuclear medicine stress test.  The orders stated cardiologist interpreted, however the orders need to state radiologist interpreted.  Thank you

## 2022-01-19 NOTE — TELEPHONE ENCOUNTER
----- Message from Gordon Fernández sent at 1/19/2022  1:43 PM CST -----  Contact: pt  Pt calling again about message sent earlier and needs to have test set up soon thanks please call back at [phone]

## 2022-01-20 NOTE — TELEPHONE ENCOUNTER
----- Message from Olga Thacker sent at 1/20/2022 10:24 AM CST -----  Regarding: Appt  Contact: [phone]  Pt is calling to confirm  if dr sousa is working out of [place] or [place]. Please contact pt

## 2022-01-24 ENCOUNTER — HOSPITAL ENCOUNTER (OUTPATIENT)
Dept: RADIOLOGY | Facility: CLINIC | Age: 62
Discharge: HOME | End: 2022-01-24
Attending: SPECIALIST
Payer: COMMERCIAL

## 2022-01-24 ENCOUNTER — CLINICAL SUPPORT (OUTPATIENT)
Dept: CARDIOLOGY | Facility: HOSPITAL | Age: 62
End: 2022-01-24
Attending: SPECIALIST
Payer: COMMERCIAL

## 2022-01-24 ENCOUNTER — HOSPITAL ENCOUNTER (OUTPATIENT)
Dept: RADIOLOGY | Facility: HOSPITAL | Age: 62
Discharge: HOME | End: 2022-01-24
Attending: SPECIALIST
Payer: COMMERCIAL

## 2022-01-24 DIAGNOSIS — I25.10 ASCVD (ARTERIOSCLEROTIC CARDIOVASCULAR DISEASE): ICD-10-CM

## 2022-01-24 DIAGNOSIS — I48.91 ATRIAL FIBRILLATION, NEW ONSET: ICD-10-CM

## 2022-01-24 LAB
CV PHARM DOSE: 0.4 MG
CV STRESS BASE HR: 106 BPM
DIASTOLIC BLOOD PRESSURE: 102 MMHG
OHS CV CPX 1 MINUTE RECOVERY HEART RATE: 106 BPM
OHS CV CPX 85 PERCENT MAX PREDICTED HEART RATE MALE: 135
OHS CV CPX MAX PREDICTED HEART RATE: 159
OHS CV CPX PATIENT IS FEMALE: 0
OHS CV CPX PATIENT IS MALE: 1
OHS CV CPX PEAK DIASTOLIC BLOOD PRESSURE: 104 MMHG
OHS CV CPX PEAK HEAR RATE: 165 BPM
OHS CV CPX PEAK RATE PRESSURE PRODUCT: NORMAL
OHS CV CPX PEAK SYSTOLIC BLOOD PRESSURE: 155 MMHG
OHS CV CPX PERCENT MAX PREDICTED HEART RATE ACHIEVED: 104
OHS CV CPX RATE PRESSURE PRODUCT PRESENTING: NORMAL
STRESS ECHO POST EXERCISE DUR MIN: 4 MINUTES
SYSTOLIC BLOOD PRESSURE: 127 MMHG

## 2022-01-24 PROCEDURE — 71046 X-RAY EXAM CHEST 2 VIEWS: CPT | Mod: 26,,, | Performed by: RADIOLOGY

## 2022-01-24 PROCEDURE — 71046 X-RAY EXAM CHEST 2 VIEWS: CPT | Mod: TC,FY,PO

## 2022-01-24 PROCEDURE — 71046 XR CHEST PA AND LATERAL: ICD-10-PCS | Mod: 26,,, | Performed by: RADIOLOGY

## 2022-01-24 PROCEDURE — 93018 CV STRESS TEST I&R ONLY: CPT | Mod: ,,, | Performed by: SPECIALIST

## 2022-01-24 PROCEDURE — 93017 CV STRESS TEST TRACING ONLY: CPT

## 2022-01-24 PROCEDURE — 63600175 PHARM REV CODE 636 W HCPCS: Performed by: SPECIALIST

## 2022-01-24 PROCEDURE — 93016 CV STRESS TEST SUPVJ ONLY: CPT | Mod: ,,, | Performed by: SPECIALIST

## 2022-01-24 PROCEDURE — A9502 TC99M TETROFOSMIN: HCPCS

## 2022-01-24 PROCEDURE — 93018 NUCLEAR STRESS TEST (CUPID ONLY): ICD-10-PCS | Mod: ,,, | Performed by: SPECIALIST

## 2022-01-24 PROCEDURE — 93016 NUCLEAR STRESS TEST (CUPID ONLY): ICD-10-PCS | Mod: ,,, | Performed by: SPECIALIST

## 2022-01-24 RX ORDER — REGADENOSON 0.08 MG/ML
0.4 INJECTION, SOLUTION INTRAVENOUS ONCE
Status: COMPLETED | OUTPATIENT
Start: 2022-01-24 | End: 2022-01-24

## 2022-01-24 RX ADMIN — REGADENOSON 0.4 MG: 0.08 INJECTION, SOLUTION INTRAVENOUS at 10:01

## 2022-01-25 ENCOUNTER — OFFICE VISIT (OUTPATIENT)
Dept: CARDIOLOGY | Facility: CLINIC | Age: 62
End: 2022-01-25
Payer: COMMERCIAL

## 2022-01-25 VITALS
SYSTOLIC BLOOD PRESSURE: 132 MMHG | OXYGEN SATURATION: 100 % | DIASTOLIC BLOOD PRESSURE: 80 MMHG | HEIGHT: 66 IN | BODY MASS INDEX: 33.11 KG/M2 | HEART RATE: 96 BPM | WEIGHT: 206 LBS

## 2022-01-25 DIAGNOSIS — I48.0 PAROXYSMAL ATRIAL FIBRILLATION: Primary | ICD-10-CM

## 2022-01-25 DIAGNOSIS — Z01.818 PRE-OP TESTING: ICD-10-CM

## 2022-01-25 PROCEDURE — 3008F BODY MASS INDEX DOCD: CPT | Mod: CPTII,S$GLB,, | Performed by: SPECIALIST

## 2022-01-25 PROCEDURE — 1160F PR REVIEW ALL MEDS BY PRESCRIBER/CLIN PHARMACIST DOCUMENTED: ICD-10-PCS | Mod: CPTII,S$GLB,, | Performed by: SPECIALIST

## 2022-01-25 PROCEDURE — 4010F PR ACE/ARB THEARPY RXD/TAKEN: ICD-10-PCS | Mod: CPTII,S$GLB,, | Performed by: SPECIALIST

## 2022-01-25 PROCEDURE — 1159F MED LIST DOCD IN RCRD: CPT | Mod: CPTII,S$GLB,, | Performed by: SPECIALIST

## 2022-01-25 PROCEDURE — 1159F PR MEDICATION LIST DOCUMENTED IN MEDICAL RECORD: ICD-10-PCS | Mod: CPTII,S$GLB,, | Performed by: SPECIALIST

## 2022-01-25 PROCEDURE — 3008F PR BODY MASS INDEX (BMI) DOCUMENTED: ICD-10-PCS | Mod: CPTII,S$GLB,, | Performed by: SPECIALIST

## 2022-01-25 PROCEDURE — 3079F DIAST BP 80-89 MM HG: CPT | Mod: CPTII,S$GLB,, | Performed by: SPECIALIST

## 2022-01-25 PROCEDURE — 99214 OFFICE O/P EST MOD 30 MIN: CPT | Mod: S$GLB,,, | Performed by: SPECIALIST

## 2022-01-25 PROCEDURE — 99214 PR OFFICE/OUTPT VISIT, EST, LEVL IV, 30-39 MIN: ICD-10-PCS | Mod: S$GLB,,, | Performed by: SPECIALIST

## 2022-01-25 PROCEDURE — 3079F PR MOST RECENT DIASTOLIC BLOOD PRESSURE 80-89 MM HG: ICD-10-PCS | Mod: CPTII,S$GLB,, | Performed by: SPECIALIST

## 2022-01-25 PROCEDURE — 3075F SYST BP GE 130 - 139MM HG: CPT | Mod: CPTII,S$GLB,, | Performed by: SPECIALIST

## 2022-01-25 PROCEDURE — 3075F PR MOST RECENT SYSTOLIC BLOOD PRESS GE 130-139MM HG: ICD-10-PCS | Mod: CPTII,S$GLB,, | Performed by: SPECIALIST

## 2022-01-25 PROCEDURE — 1160F RVW MEDS BY RX/DR IN RCRD: CPT | Mod: CPTII,S$GLB,, | Performed by: SPECIALIST

## 2022-01-25 PROCEDURE — 4010F ACE/ARB THERAPY RXD/TAKEN: CPT | Mod: CPTII,S$GLB,, | Performed by: SPECIALIST

## 2022-01-26 ENCOUNTER — TELEPHONE (OUTPATIENT)
Dept: CARDIOLOGY | Facility: CLINIC | Age: 62
End: 2022-01-26
Payer: COMMERCIAL

## 2022-01-26 DIAGNOSIS — I48.0 PAROXYSMAL ATRIAL FIBRILLATION: Primary | ICD-10-CM

## 2022-01-26 NOTE — PROGRESS NOTES
Subjective:    Patient ID:  Rich Ballard is a 61 y.o. male who presents for   Atrial Fibrillation and Results (Stress, echo, holter)    HPI afib d  Of patient is been on Eliquis for several weeks and wants to get back in sinus rhythm  Stress test was normal  ECHO normal      Review of patient's allergies indicates:   Allergen Reactions    No known allergies        Past Medical History:   Diagnosis Date    Allergy     Atrial fibrillation     Hyperlipidemia     Hypertension      Past Surgical History:   Procedure Laterality Date    APPENDECTOMY      CERVICAL FUSION      COLONOSCOPY N/A 2/1/2017    Procedure: COLONOSCOPY;  Surgeon: Kennedy Soto MD;  Location: [facility];  Service: Endoscopy;  Laterality: N/A;    ppp      ROTATOR CUFF REPAIR      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       Social History     Tobacco Use    Smoking status: Former Smoker     Packs/day: 0.50     Types: Cigarettes    Smokeless tobacco: Never Used   Substance Use Topics    Alcohol use: No     Comment: very rarely    Drug use: No        Review of Systems     ROS        Objective:        Vitals:    01/25/22 1729   BP: 132/80   Pulse: 96       Lab Results   Component Value Date    WBC 6.56 07/07/2021    HGB 15.7 07/07/2021    HCT 50.7 07/07/2021     07/07/2021    CHOL 217 (H) 07/07/2021    TRIG 110 07/07/2021    HDL 39 (L) 07/07/2021    ALT 34 07/07/2021    AST 20 07/07/2021     07/07/2021    K 4.9 07/07/2021     07/07/2021    CREATININE 0.8 07/07/2021    BUN 14 07/07/2021    CO2 27 07/07/2021    TSH 1.183 12/23/2016    PSA 3.4 07/07/2021        ECHOCARDIOGRAM RESULTS  No results found for this or any previous visit.      CURRENT/PREVIOUS VISIT EKG  Results for orders placed or performed in visit on 01/14/22   EKG 12-lead    Collection Time: 01/14/22  1:02 PM    Narrative    Test Reason : R00.0,I49.9,    Vent. Rate : 118 BPM     Atrial Rate : 131 BPM     P-R Int : 000 ms          QRS Dur : 090 ms      QT Int : 342  ms       P-R-T Axes : 000 045 001 degrees     QTc Int : 479 ms    Atrial fibrillation with rapid ventricular response  Abnormal ECG  No previous ECGs available  Confirmed by Ced Gonzalez MD ([id number]) on 1/19/2022 11:36:23 AM    Referred By: NISHANT TUCKER           Confirmed By:Ced Gonzalez MD     No results found for this or any previous visit.    Results for orders placed in visit on 01/24/22    Nuclear Stress Test    Interpretation Summary    The EKG portion of this study is negative for ischemia.    The patient reported no chest pain during the stress test.    During stress, atrial fibrillation is noted.    The nuclear portion of this study will be reported separately.      PHYSICAL EXAM    Physical Exam 120/80  Lungs are clear  Cardiac irregular    Medication List with Changes/Refills   Current Medications    AMLODIPINE (NORVASC) 10 MG TABLET    TAKE 1 TABLET BY MOUTH ONCE DAILY IN THE EVENING    APIXABAN (ELIQUIS) 5 MG TAB    Take 1 tablet (5 mg total) by mouth once daily.    BENAZEPRIL (LOTENSIN) 40 MG TABLET    TAKE 1 TABLET BY MOUTH ONCE DAILY IN THE EVENING    FLUTICASONE PROPIONATE (FLONASE) 50 MCG/ACTUATION NASAL SPRAY    2 sprays (100 mcg total) by Each Nostril route once daily.    METOPROLOL SUCCINATE (TOPROL-XL) 50 MG 24 HR TABLET    Take 1 tablet (50 mg total) by mouth 2 (two) times daily.           Assessment:     paroxysmal atrial fib  Obstructive sleep apnea       Plan:   Admit this Thursday for GEOVANNI cardioversion    Problem List Items Addressed This Visit    None          No follow-ups on file.

## 2022-01-27 ENCOUNTER — HOSPITAL ENCOUNTER (OUTPATIENT)
Facility: HOSPITAL | Age: 62
Discharge: HOME | End: 2022-01-27
Attending: SPECIALIST | Admitting: SPECIALIST
Payer: COMMERCIAL

## 2022-01-27 VITALS
HEART RATE: 109 BPM | RESPIRATION RATE: 19 BRPM | OXYGEN SATURATION: 97 % | SYSTOLIC BLOOD PRESSURE: 135 MMHG | DIASTOLIC BLOOD PRESSURE: 84 MMHG

## 2022-01-27 DIAGNOSIS — I48.91 ATRIAL FIBRILLATION: ICD-10-CM

## 2022-01-27 DIAGNOSIS — I48.0 PAROXYSMAL ATRIAL FIBRILLATION: ICD-10-CM

## 2022-01-27 DIAGNOSIS — U07.1 COVID-19 VIRUS DETECTED: ICD-10-CM

## 2022-01-27 LAB
ALBUMIN SERPL BCP-MCNC: 4.1 G/DL (ref 3.5–5.2)
ALP SERPL-CCNC: 72 U/L (ref 55–135)
ALT SERPL W/O P-5'-P-CCNC: 27 U/L (ref 10–44)
ANION GAP SERPL CALC-SCNC: 12 MMOL/L (ref 8–16)
AST SERPL-CCNC: 22 U/L (ref 10–40)
BASOPHILS # BLD AUTO: 0.03 K/UL (ref 0–0.2)
BASOPHILS NFR BLD: 0.2 % (ref 0–1.9)
BILIRUB SERPL-MCNC: 0.8 MG/DL (ref 0.1–1)
BUN SERPL-MCNC: 13 MG/DL (ref 8–23)
CALCIUM SERPL-MCNC: 8.8 MG/DL (ref 8.7–10.5)
CHLORIDE SERPL-SCNC: 104 MMOL/L (ref 95–110)
CO2 SERPL-SCNC: 23 MMOL/L (ref 23–29)
CREAT SERPL-MCNC: 0.6 MG/DL (ref 0.5–1.4)
DIFFERENTIAL METHOD: ABNORMAL
EOSINOPHIL # BLD AUTO: 0.1 K/UL (ref 0–0.5)
EOSINOPHIL NFR BLD: 0.4 % (ref 0–8)
ERYTHROCYTE [DISTWIDTH] IN BLOOD BY AUTOMATED COUNT: 13.1 % (ref 11.5–14.5)
EST. GFR  (AFRICAN AMERICAN): >60 ML/MIN/1.73 M^2
EST. GFR  (NON AFRICAN AMERICAN): >60 ML/MIN/1.73 M^2
GLUCOSE SERPL-MCNC: 128 MG/DL (ref 70–110)
HCT VFR BLD AUTO: 49.7 % (ref 40–54)
HGB BLD-MCNC: 16.3 G/DL (ref 14–18)
IMM GRANULOCYTES # BLD AUTO: 0.06 K/UL (ref 0–0.04)
IMM GRANULOCYTES NFR BLD AUTO: 0.5 % (ref 0–0.5)
INR PPP: 1.2
LYMPHOCYTES # BLD AUTO: 2.5 K/UL (ref 1–4.8)
LYMPHOCYTES NFR BLD: 20.6 % (ref 18–48)
MCH RBC QN AUTO: 30.2 PG (ref 27–31)
MCHC RBC AUTO-ENTMCNC: 32.8 G/DL (ref 32–36)
MCV RBC AUTO: 92 FL (ref 82–98)
MONOCYTES # BLD AUTO: 0.8 K/UL (ref 0.3–1)
MONOCYTES NFR BLD: 7 % (ref 4–15)
NEUTROPHILS # BLD AUTO: 8.6 K/UL (ref 1.8–7.7)
NEUTROPHILS NFR BLD: 71.3 % (ref 38–73)
NRBC BLD-RTO: 0 /100 WBC
PLATELET # BLD AUTO: 259 K/UL (ref 150–450)
PMV BLD AUTO: 8.8 FL (ref 9.2–12.9)
POTASSIUM SERPL-SCNC: 4 MMOL/L (ref 3.5–5.1)
PROT SERPL-MCNC: 7.2 G/DL (ref 6–8.4)
PROTHROMBIN TIME: 14.3 SEC (ref 11.4–13.7)
RBC # BLD AUTO: 5.39 M/UL (ref 4.6–6.2)
SARS-COV-2 RDRP RESP QL NAA+PROBE: POSITIVE
SODIUM SERPL-SCNC: 139 MMOL/L (ref 136–145)
WBC # BLD AUTO: 12.03 K/UL (ref 3.9–12.7)

## 2022-01-27 PROCEDURE — 93010 ELECTROCARDIOGRAM REPORT: CPT | Mod: ,,, | Performed by: GENERAL PRACTICE

## 2022-01-27 PROCEDURE — 93005 ELECTROCARDIOGRAM TRACING: CPT | Performed by: GENERAL PRACTICE

## 2022-01-27 PROCEDURE — 85025 COMPLETE CBC W/AUTO DIFF WBC: CPT | Performed by: SPECIALIST

## 2022-01-27 PROCEDURE — 93010 EKG 12-LEAD: ICD-10-PCS | Mod: ,,, | Performed by: GENERAL PRACTICE

## 2022-01-27 PROCEDURE — 80053 COMPREHEN METABOLIC PANEL: CPT | Performed by: SPECIALIST

## 2022-01-27 PROCEDURE — 85610 PROTHROMBIN TIME: CPT | Performed by: SPECIALIST

## 2022-01-27 PROCEDURE — U0002 COVID-19 LAB TEST NON-CDC: HCPCS | Performed by: SPECIALIST

## 2022-01-27 NOTE — NURSING
Rapid covid screen positive, notified Dr. Santana.  Procedure will need to be rescheduled.  Patient and spouse notified

## 2022-02-16 DIAGNOSIS — I48.0 PAROXYSMAL ATRIAL FIBRILLATION: Primary | ICD-10-CM

## 2022-02-17 ENCOUNTER — ANESTHESIA (OUTPATIENT)
Dept: CARDIOLOGY | Facility: HOSPITAL | Age: 62
End: 2022-02-17
Payer: COMMERCIAL

## 2022-02-17 ENCOUNTER — CLINICAL SUPPORT (OUTPATIENT)
Dept: CARDIOLOGY | Facility: HOSPITAL | Age: 62
End: 2022-02-17
Attending: SPECIALIST
Payer: COMMERCIAL

## 2022-02-17 ENCOUNTER — TELEPHONE (OUTPATIENT)
Dept: CARDIOLOGY | Facility: CLINIC | Age: 62
End: 2022-02-17

## 2022-02-17 ENCOUNTER — ANESTHESIA EVENT (OUTPATIENT)
Dept: CARDIOLOGY | Facility: HOSPITAL | Age: 62
End: 2022-02-17
Payer: COMMERCIAL

## 2022-02-17 ENCOUNTER — HOSPITAL ENCOUNTER (OUTPATIENT)
Facility: HOSPITAL | Age: 62
Discharge: HOME | End: 2022-02-17
Attending: SPECIALIST | Admitting: SPECIALIST
Payer: COMMERCIAL

## 2022-02-17 VITALS
HEART RATE: 80 BPM | SYSTOLIC BLOOD PRESSURE: 119 MMHG | OXYGEN SATURATION: 100 % | TEMPERATURE: 97 F | RESPIRATION RATE: 17 BRPM | DIASTOLIC BLOOD PRESSURE: 71 MMHG

## 2022-02-17 VITALS
TEMPERATURE: 98 F | DIASTOLIC BLOOD PRESSURE: 83 MMHG | WEIGHT: 198 LBS | OXYGEN SATURATION: 98 % | HEART RATE: 80 BPM | RESPIRATION RATE: 18 BRPM | SYSTOLIC BLOOD PRESSURE: 130 MMHG | BODY MASS INDEX: 31.82 KG/M2 | HEIGHT: 66 IN

## 2022-02-17 DIAGNOSIS — I48.91 A-FIB: ICD-10-CM

## 2022-02-17 DIAGNOSIS — I48.91 AFIB: ICD-10-CM

## 2022-02-17 DIAGNOSIS — I10 ESSENTIAL HYPERTENSION: ICD-10-CM

## 2022-02-17 DIAGNOSIS — I48.0 PAROXYSMAL ATRIAL FIBRILLATION: ICD-10-CM

## 2022-02-17 LAB
ANION GAP SERPL CALC-SCNC: 10 MMOL/L (ref 8–16)
BASOPHILS # BLD AUTO: 0.02 K/UL (ref 0–0.2)
BASOPHILS NFR BLD: 0.4 % (ref 0–1.9)
BSA FOR ECHO PROCEDURE: 2.04 M2
BUN SERPL-MCNC: 14 MG/DL (ref 8–23)
CALCIUM SERPL-MCNC: 8.9 MG/DL (ref 8.7–10.5)
CHLORIDE SERPL-SCNC: 105 MMOL/L (ref 95–110)
CO2 SERPL-SCNC: 24 MMOL/L (ref 23–29)
CREAT SERPL-MCNC: 0.6 MG/DL (ref 0.5–1.4)
DIFFERENTIAL METHOD: NORMAL
EJECTION FRACTION: 60 %
EOSINOPHIL # BLD AUTO: 0.2 K/UL (ref 0–0.5)
EOSINOPHIL NFR BLD: 2.7 % (ref 0–8)
ERYTHROCYTE [DISTWIDTH] IN BLOOD BY AUTOMATED COUNT: 13.3 % (ref 11.5–14.5)
EST. GFR  (AFRICAN AMERICAN): >60 ML/MIN/1.73 M^2
EST. GFR  (NON AFRICAN AMERICAN): >60 ML/MIN/1.73 M^2
GLUCOSE SERPL-MCNC: 121 MG/DL (ref 70–110)
HCT VFR BLD AUTO: 47.8 % (ref 40–54)
HGB BLD-MCNC: 15.3 G/DL (ref 14–18)
IMM GRANULOCYTES # BLD AUTO: 0.02 K/UL (ref 0–0.04)
IMM GRANULOCYTES NFR BLD AUTO: 0.4 % (ref 0–0.5)
LYMPHOCYTES # BLD AUTO: 1.9 K/UL (ref 1–4.8)
LYMPHOCYTES NFR BLD: 34.4 % (ref 18–48)
MCH RBC QN AUTO: 30.1 PG (ref 27–31)
MCHC RBC AUTO-ENTMCNC: 32 G/DL (ref 32–36)
MCV RBC AUTO: 94 FL (ref 82–98)
MONOCYTES # BLD AUTO: 0.6 K/UL (ref 0.3–1)
MONOCYTES NFR BLD: 10 % (ref 4–15)
NEUTROPHILS # BLD AUTO: 2.9 K/UL (ref 1.8–7.7)
NEUTROPHILS NFR BLD: 52.1 % (ref 38–73)
NRBC BLD-RTO: 0 /100 WBC
PLATELET # BLD AUTO: 285 K/UL (ref 150–450)
PMV BLD AUTO: 9.3 FL (ref 9.2–12.9)
POTASSIUM SERPL-SCNC: 4.5 MMOL/L (ref 3.5–5.1)
RBC # BLD AUTO: 5.09 M/UL (ref 4.6–6.2)
SODIUM SERPL-SCNC: 139 MMOL/L (ref 136–145)
WBC # BLD AUTO: 5.49 K/UL (ref 3.9–12.7)

## 2022-02-17 PROCEDURE — 93010 EKG 12-LEAD: ICD-10-PCS | Mod: 59,,, | Performed by: SPECIALIST

## 2022-02-17 PROCEDURE — 99214 PR OFFICE/OUTPT VISIT, EST, LEVL IV, 30-39 MIN: ICD-10-PCS | Mod: 25,ICN,, | Performed by: SPECIALIST

## 2022-02-17 PROCEDURE — 92960 PR CARDIOVERSION, ELECTIVE;EXTERN: ICD-10-PCS | Mod: ICN,,, | Performed by: SPECIALIST

## 2022-02-17 PROCEDURE — 85025 COMPLETE CBC W/AUTO DIFF WBC: CPT | Performed by: SPECIALIST

## 2022-02-17 PROCEDURE — 63600175 PHARM REV CODE 636 W HCPCS

## 2022-02-17 PROCEDURE — 36415 COLL VENOUS BLD VENIPUNCTURE: CPT | Performed by: SPECIALIST

## 2022-02-17 PROCEDURE — 63600175 PHARM REV CODE 636 W HCPCS: Performed by: NURSE ANESTHETIST, CERTIFIED REGISTERED

## 2022-02-17 PROCEDURE — 92960 CARDIOVERSION ELECTRIC EXT: CPT | Mod: ICN,,, | Performed by: SPECIALIST

## 2022-02-17 PROCEDURE — 93325 PR DOPPLER COLOR FLOW VELOCITY MAP: ICD-10-PCS | Mod: 26,ICN,, | Performed by: SPECIALIST

## 2022-02-17 PROCEDURE — 80048 BASIC METABOLIC PNL TOTAL CA: CPT | Performed by: SPECIALIST

## 2022-02-17 PROCEDURE — 93320 PR DOPPLER ECHO HEART,COMPLETE: ICD-10-PCS | Mod: 26,ICN,, | Performed by: SPECIALIST

## 2022-02-17 PROCEDURE — 93312 ECHO TRANSESOPHAGEAL: CPT | Mod: 26,ICN,, | Performed by: SPECIALIST

## 2022-02-17 PROCEDURE — 25000003 PHARM REV CODE 250: Performed by: NURSE ANESTHETIST, CERTIFIED REGISTERED

## 2022-02-17 PROCEDURE — 37000008 HC ANESTHESIA 1ST 15 MINUTES: Performed by: SPECIALIST

## 2022-02-17 PROCEDURE — 37000009 HC ANESTHESIA EA ADD 15 MINS: Performed by: SPECIALIST

## 2022-02-17 PROCEDURE — 27202103: Performed by: NURSE ANESTHETIST, CERTIFIED REGISTERED

## 2022-02-17 PROCEDURE — 93312 PR ECHO HEART,TRANSESOPHAGEAL: ICD-10-PCS | Mod: 26,ICN,, | Performed by: SPECIALIST

## 2022-02-17 PROCEDURE — 93005 ELECTROCARDIOGRAM TRACING: CPT | Performed by: SPECIALIST

## 2022-02-17 PROCEDURE — 93010 ELECTROCARDIOGRAM REPORT: CPT | Mod: 76,,, | Performed by: SPECIALIST

## 2022-02-17 PROCEDURE — 93325 DOPPLER ECHO COLOR FLOW MAPG: CPT

## 2022-02-17 PROCEDURE — 99214 OFFICE O/P EST MOD 30 MIN: CPT | Mod: 25,ICN,, | Performed by: SPECIALIST

## 2022-02-17 PROCEDURE — 93325 DOPPLER ECHO COLOR FLOW MAPG: CPT | Mod: 26,ICN,, | Performed by: SPECIALIST

## 2022-02-17 PROCEDURE — 93320 DOPPLER ECHO COMPLETE: CPT | Mod: 26,ICN,, | Performed by: SPECIALIST

## 2022-02-17 RX ORDER — SOTALOL HYDROCHLORIDE 80 MG/1
80 TABLET ORAL 2 TIMES DAILY
Status: DISCONTINUED | OUTPATIENT
Start: 2022-02-17 | End: 2022-02-17 | Stop reason: HOSPADM

## 2022-02-17 RX ORDER — SOTALOL HYDROCHLORIDE 80 MG/1
80 TABLET ORAL 2 TIMES DAILY
Qty: 60 TABLET | Refills: 11 | Status: SHIPPED | OUTPATIENT
Start: 2022-02-17 | End: 2022-02-24 | Stop reason: ALTCHOICE

## 2022-02-17 RX ORDER — PROPOFOL 10 MG/ML
INJECTION, EMULSION INTRAVENOUS
Status: DISCONTINUED | OUTPATIENT
Start: 2022-02-17 | End: 2022-02-17

## 2022-02-17 RX ADMIN — PROPOFOL 20 MG: 10 INJECTION, EMULSION INTRAVENOUS at 08:02

## 2022-02-17 RX ADMIN — AMIODARONE HYDROCHLORIDE 150 MG: 1.5 INJECTION, SOLUTION INTRAVENOUS at 07:02

## 2022-02-17 RX ADMIN — PROPOFOL 50 MG: 10 INJECTION, EMULSION INTRAVENOUS at 08:02

## 2022-02-17 RX ADMIN — SODIUM CHLORIDE: 900 INJECTION, SOLUTION INTRAVENOUS at 08:02

## 2022-02-17 RX ADMIN — PROPOFOL 30 MG: 10 INJECTION, EMULSION INTRAVENOUS at 08:02

## 2022-02-17 NOTE — PROGRESS NOTES
[facility]  Cardiology  Progress Note    Patient Name: Rich Ballard  MRN: [id number]  Admission Date: 2/17/2022  Hospital Length of Stay: 0 days  Code Status: No Order   Attending Physician: Kaleb Santana MD   Primary Care Physician: Piotr Gautam MD  Expected Discharge Date:   Principal Problem:<principal problem not specified>    Subjective:     Hospital Course:   Transesophageal echo demonstrated normal LV and no abnormal intracavitary echoes  Cardioverted with 200 joules to sinus rhythm  Patient did have possible allergic reaction to initial dose of IV amiodarone in this is discontinued      Interval History:  Review of Systems   Constitutional: Positive for weight gain.   HENT: Negative.    Cardiovascular: Positive for palpitations.   Respiratory: Positive for shortness of breath and sleep disturbances due to breathing.    Endocrine: Negative.      Objective:     Vital Signs (Most Recent):  Temp: 98.4 °F (36.9 °C) (02/17/22 0710)  Pulse: 79 (02/17/22 0915)  Resp: 18 (02/17/22 0915)  BP: 122/78 (02/17/22 0915)  SpO2: 96 % (02/17/22 0915) Vital Signs (24h Range):  Temp:  [96.8 °F (36 °C)-98.4 °F (36.9 °C)] 96.8 °F (36 °C)  Pulse:  [] 79  Resp:  [9-22] 18  SpO2:  [96 %-100 %] 96 %  BP: (108-148)/() 122/78     Weight: 89.8 kg (198 lb)  Body mass index is 31.96 kg/m².     SpO2: 96 %         Intake/Output Summary (Last 24 hours) at 2/17/2022 0957  Last data filed at 2/17/2022 0901  Gross per 24 hour   Intake 250 ml   Output --   Net 250 ml       Lines/Drains/Airways     Peripheral Intravenous Line                 Peripheral IV - Single Lumen 02/17/22 0707 20 G Anterior;Proximal;Right Forearm <1 day                Physical Exam blood pressure 120/80 pulse is 75  Of lungs slightly diminished breath sounds  Cardiac irregular  Abdomen slightly distended  No edema    Significant Labs:   CMP   Recent Labs   Lab 02/17/22  0700      K 4.5      CO2 24   *   BUN 14    CREATININE 0.6   CALCIUM 8.9   ANIONGAP 10   ESTGFRAFRICA >60.0   EGFRNONAA >60.0   , CBC   Recent Labs   Lab 02/17/22  0640   WBC 5.49   HGB 15.3   HCT 47.8       and Troponin No results for input(s): TROPONINI in the last 48 hours.    Significant Imaging:     Assessment and Plan:   Atrial fibrillation  Obstructive sleep apnea  Will GEOVANNI cardiovert today  Brief HPI:    No notes have been filed under this hospital service.  Service: Cardiology      VTE Risk Mitigation (From admission, onward)    None          Kaleb Santana MD  Cardiology  [facility]

## 2022-02-17 NOTE — SUBJECTIVE & OBJECTIVE
Interval History:  Review of Systems   Constitutional: Positive for weight gain.   HENT: Negative.    Cardiovascular: Positive for palpitations.   Respiratory: Positive for shortness of breath and sleep disturbances due to breathing.    Endocrine: Negative.      Objective:     Vital Signs (Most Recent):  Temp: 98.4 °F (36.9 °C) (02/17/22 0710)  Pulse: 79 (02/17/22 0915)  Resp: 18 (02/17/22 0915)  BP: 122/78 (02/17/22 0915)  SpO2: 96 % (02/17/22 0915) Vital Signs (24h Range):  Temp:  [96.8 °F (36 °C)-98.4 °F (36.9 °C)] 96.8 °F (36 °C)  Pulse:  [] 79  Resp:  [9-22] 18  SpO2:  [96 %-100 %] 96 %  BP: (108-148)/() 122/78     Weight: 89.8 kg (198 lb)  Body mass index is 31.96 kg/m².     SpO2: 96 %         Intake/Output Summary (Last 24 hours) at 2/17/2022 0957  Last data filed at 2/17/2022 0901  Gross per 24 hour   Intake 250 ml   Output --   Net 250 ml       Lines/Drains/Airways     Peripheral Intravenous Line                 Peripheral IV - Single Lumen 02/17/22 0707 20 G Anterior;Proximal;Right Forearm <1 day                Physical Exam blood pressure 120/80 pulse is 75  Of lungs slightly diminished breath sounds  Cardiac irregular  Abdomen slightly distended  No edema    Significant Labs:   CMP   Recent Labs   Lab 02/17/22  0700      K 4.5      CO2 24   *   BUN 14   CREATININE 0.6   CALCIUM 8.9   ANIONGAP 10   ESTGFRAFRICA >60.0   EGFRNONAA >60.0   , CBC   Recent Labs   Lab 02/17/22  0640   WBC 5.49   HGB 15.3   HCT 47.8       and Troponin No results for input(s): TROPONINI in the last 48 hours.    Significant Imaging:

## 2022-02-17 NOTE — DISCHARGE SUMMARY
[facility]  Discharge Note  Short Stay    Procedure(s) (LRB):  ECHOCARDIOGRAM,TRANSESOPHAGEAL (N/A)  CARDIOVERSION (N/A)    OUTCOME: Patient tolerated treatment/procedure well without complication and is now ready for discharge.    DISPOSITION: Home or Self Care    FINAL DIAGNOSIS:  <principal problem not specified>    FOLLOWUP: In clinic    DISCHARGE INSTRUCTIONS:  No discharge procedures on file.     TIME SPENT ON DISCHARGE: 45 minutes

## 2022-02-17 NOTE — PROCEDURES
"Rich Ballard is a 61 y.o. male patient.    Temp: 98.4 °F (36.9 °C) (02/17/22 0710)  Pulse: 73 (02/17/22 1000)  Resp: 16 (02/17/22 1000)  BP: (!) 131/95 (02/17/22 1000)  SpO2: 98 % (02/17/22 1000)  Weight: 89.8 kg (198 lb) (02/17/22 0710)  Height: 5' 6" (167.6 cm) (02/17/22 0710)       Procedures   GEOVANNI consent obtained   -No absolute contraindications of esophageal stricture, tumor, perforation, laceration,or diverticulum and/or active GI bleed  -The risks, benefits & alternatives of the procedure were explained to the patient.   -The risks of transesophageal echo include but are not limited to:  Dental trauma, esophageal trauma/perforation, bleeding, laryngospasm/brochospasm, aspiration, sore throat/hoarseness, & dislodgement of the endotracheal tube/nasogastric tube (where applicable).    -The risks of moderate sedation include hypotension, respiratory depression, arrhythmias, bronchospasm, & death.    -Informed consent was obtained. The patient is agreeable to proceed with the procedure and all questions and concerns addressed.    M'allampati score  propofol used for IV sedation  GEOVANNI probe then passed  via  transesophageal route and  imaging performed in the  standard 3 views ie  Upper esophageal, ,mid esophageal, and transgastric views . Saline bubbles were injected IV for PFO imaging of   Intraatrial septum.   At end of procedure, GEOVANNI probe removed. The patient tolerated procedure well .     No clot seen in left atrial appendage or left atrium    2/17/2022  "

## 2022-02-17 NOTE — PROCEDURES
GEOVANNI consent obtained   -No absolute contraindications of esophageal stricture, tumor, perforation, laceration,or diverticulum and/or active GI bleed  -The risks, benefits & alternatives of the procedure were explained to the patient.   -The risks of transesophageal echo include but are not limited to:  Dental trauma, esophageal trauma/perforation, bleeding, laryngospasm/brochospasm, aspiration, sore throat/hoarseness, & dislodgement of the endotracheal tube/nasogastric tube (where applicable).    -The risks of moderate sedation include hypotension, respiratory depression, arrhythmias, bronchospasm, & death.    -Informed consent was obtained. The patient is agreeable to proceed with the procedure and all questions and concerns addressed.    M'allampati score1 propofol used for IV sedationTEE probe then passed  via  transesophageal route and  imaging performed in the  standard 3 views ie  Upper esophageal, ,mid esophageal, and transgastric views . Saline bubbles were injected IV for PFO imaging of   Intraatrial septum.   At end of procedure, GEOVANNI probe removed. The patient tolerated procedure well .  There was no evidence of clots within left atrium left atrial appendage in no PFO and no intracavitary abnormalities    Cardioversion was then performed  Patient had given consent verbally and written pre procedure for cardioversion  He had been sedated with propofol for the GEOVANNI procedure room was still sedated  He was cardioverted with 200 joules synchronously cardioverted with 200 joules synchronously to sinus rhythm  He tolerated the procedure well  No blood loss no complications

## 2022-02-17 NOTE — NURSING
Pt started on Amiodarone bolus, then stated felt flushed and SOB,O2 3L N/C applied. noted face with flushed color and Sats dropped to 88. Amiodarone stopped. Dr Santana notified. See new orders. OK to stop Amiodarone, labs ordered, chest xray done. Pt started feeling better after Amiodarone stopped

## 2022-02-17 NOTE — ANESTHESIA POSTPROCEDURE EVALUATION
Anesthesia Post Evaluation    Patient: Rich Ballard    Procedure(s) Performed: Procedure(s) (LRB):  ECHOCARDIOGRAM,TRANSESOPHAGEAL (N/A)  CARDIOVERSION (N/A)    Final Anesthesia Type: MAC      Patient location during evaluation: [facility]  Patient participation: Yes- Able to Participate  Level of consciousness: awake and alert  Post-procedure vital signs: reviewed and stable  Pain management: adequate  Airway patency: patent    PONV status at discharge: No PONV  Anesthetic complications: no      Cardiovascular status: hemodynamically stable  Respiratory status: unassisted, spontaneous ventilation and room air  Hydration status: euvolemic  Follow-up not needed.          Vitals Value Taken Time   /78 02/17/22 0915   Temp  02/17/22 0925   Pulse 76 02/17/22 0923   Resp 19 02/17/22 0923   SpO2 98 % 02/17/22 0923   Vitals shown include unvalidated device data.      No case tracking events are documented in the log.      Pain/Marisol Score: No data recorded

## 2022-02-17 NOTE — DISCHARGE INSTRUCTIONS
Cardioversion Discharge Instructions:   Ask your physician when you can return to your normal activities   Do not drive for at least 24 hours    SEEK CARE IMMEDIATELY IF:   You feel your heart beating fast or fluttering   You feel weak or faint   Your leg or arm is larger than usual, painful, or warm    CONTACT YOUR PHYSICIAN IF:   Your skin is itchy, swollen, or if you have a rash    You have questions or concerns about your condition or care

## 2022-02-17 NOTE — TELEPHONE ENCOUNTER
----- Message from Leidy Blakely sent at 2/17/2022 10:57 AM CST -----  Type:  Sooner Apoointment Request    Caller is requesting a sooner appointment.  Caller declined first available appointment listed below.  Caller will not accept being placed on the waitlist and is requesting a message be sent to doctor.    Name of Caller: Cynthia Ballard (Spouse)  Symptoms:  n/a  Best Call Back Number:    Additional Information:  Calling to schedule 1 week follow up

## 2022-02-17 NOTE — TRANSFER OF CARE
"Anesthesia Transfer of Care Note    Patient: Rich Ballard    Procedure(s) Performed: Procedure(s) (LRB):  ECHOCARDIOGRAM,TRANSESOPHAGEAL (N/A)  CARDIOVERSION (N/A)    Patient location: PACU    Anesthesia Type: general    Transport from OR: Transported from OR on room air with adequate spontaneous ventilation    Post pain: adequate analgesia    Post assessment: no apparent anesthetic complications    Post vital signs: stable    Level of consciousness: awake and alert    Nausea/Vomiting: no nausea/vomiting    Complications: none    Transfer of care protocol was followed      Last vitals:   Visit Vitals  BP (!) 120/91   Pulse 97   Temp 36.9 °C (98.4 °F) (Oral)   Resp 14   Ht 5' 6" (1.676 m)   Wt 89.8 kg (198 lb)   SpO2 100%   BMI 31.96 kg/m²     "

## 2022-02-17 NOTE — HOSPITAL COURSE
Transesophageal echo demonstrated normal LV and no abnormal intracavitary echoes  Cardioverted with 200 joules to sinus rhythm  Patient did have possible allergic reaction to initial dose of IV amiodarone in this is discontinued

## 2022-02-17 NOTE — ANESTHESIA PREPROCEDURE EVALUATION
02/17/2022  Rich Ballard is a 61 y.o., male.      Patient Active Problem List   Diagnosis    Corneal edema, unspecified    Unspecified disorder of conjunctiva    Hypertension    Hyperlipidemia    Screen for colon cancer    Severe obesity with body mass index (BMI) of 35.0 to 39.9 with comorbidity    Tachycardia    Irregularly irregular pulse rhythm    Atrial fibrillation, new onset       Past Surgical History:   Procedure Laterality Date    APPENDECTOMY      CERVICAL FUSION      COLONOSCOPY N/A 2/1/2017    Procedure: COLONOSCOPY;  Surgeon: Kennedy Soto MD;  Location: [facility];  Service: Endoscopy;  Laterality: N/A;    ppp      ROTATOR CUFF REPAIR      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          Tobacco Use:  The patient  reports that he has been smoking cigarettes. He has been smoking about 0.50 packs per day. He has never used smokeless tobacco.     Results for orders placed or performed during the hospital encounter of 01/27/22   EKG 12-LEAD    Collection Time: 01/27/22  8:03 AM    Narrative    Test Reason :     Vent. Rate : 119 BPM     Atrial Rate : 241 BPM     P-R Int : 000 ms          QRS Dur : 086 ms      QT Int : 328 ms       P-R-T Axes : 000 069 026 degrees     QTc Int : 461 ms    Atrial fibrillation with rapid ventricular response  Cannot rule out Anterior infarct ,age undetermined  Abnormal ECG  When compared with ECG of 14-JAN-2022 13:02,  No significant change was found  Confirmed by Angeles TRINH, Piotr ABDI ([id number]) on 1/31/2022 10:58:23 AM    Referred By:             Confirmed By:Piotr Reynoso MD             Lab Results   Component Value Date    WBC 12.03 01/27/2022    HGB 16.3 01/27/2022    HCT 49.7 01/27/2022    MCV 92 01/27/2022     01/27/2022     BMP  Lab Results   Component Value Date     01/27/2022    K 4.0 01/27/2022     01/27/2022    CO2 23 01/27/2022     BUN 13 01/27/2022    CREATININE 0.6 01/27/2022    CALCIUM 8.8 01/27/2022    ANIONGAP 12 01/27/2022     (H) 01/27/2022     (H) 07/07/2021    GLU 96 03/01/2018       Results for orders placed during the hospital encounter of 01/20/22    Echo    Interpretation Summary  · The left ventricle is normal in size with concentric remodeling and normal systolic function.  · The estimated ejection fraction is 65%.  · A diastolic pattern consistent with atrial fibrillation observed.  · With normal left atrial pressure.  · Normal right ventricular size with normal right ventricular systolic function.  · Mild mitral regurgitation.  · Normal central venous pressure (3 mmHg).  · The estimated PA systolic pressure is 27 mmHg.            Anesthesia Evaluation    I have reviewed the Patient Summary Reports.    I have reviewed the Nursing Notes. I have reviewed the NPO Status.   I have reviewed the Medications.     Review of Systems  Anesthesia Hx:  No problems with previous Anesthesia  Denies Family Hx of Anesthesia complications.   Denies Personal Hx of Anesthesia complications.   Social:  Smoker    Hematology/Oncology:  Hematology Normal        Cardiovascular:   Hypertension Dysrhythmias (new onset, RVR on admit, rate controlled now) atrial fibrillation hyperlipidemia ECG has been reviewed.    Pulmonary:  Pulmonary Normal    Hepatic/GI:  Hepatic/GI Normal    Musculoskeletal:  Musculoskeletal Normal    Neurological:  Neurology Normal    Endocrine:  Endocrine Normal        Physical Exam  General:  Well nourished, Obesity    Airway/Jaw/Neck:  Airway Findings: Mouth Opening: Normal Tongue: Normal  General Airway Assessment: Adult  Mallampati: III  TM Distance: Normal, at least 6 cm  Jaw/Neck Findings:  Neck ROM: Normal ROM       Chest/Lungs:  Chest/Lungs Findings: Clear to auscultation, Normal Respiratory Rate     Heart/Vascular:  Heart Findings: Rate: Normal  Rhythm: Irregularly Irregular  Sounds: Normal      Abdomen:  Abdomen Findings: Normal    Musculoskeletal:  Musculoskeletal Findings: Normal   Skin:  Skin Findings: Normal    Mental Status:  Mental Status Findings:  Cooperative, Alert and Oriented         Anesthesia Plan  Type of Anesthesia, risks & benefits discussed:  Anesthesia Type:  MAC    Patient's Preference:   Plan Factors:          Intra-op Monitoring Plan: standard ASA monitors  Intra-op Monitoring Plan Comments:   Post Op Pain Control Plan: per primary service following discharge from PACU  Post Op Pain Control Plan Comments:     Induction:    Beta Blocker:         Informed Consent: Patient understands risks and agrees with Anesthesia plan.  Questions answered. Anesthesia consent signed with patient.  ASA Score: 3  emergent   Day of Surgery Review of History & Physical:        Anesthesia Plan Notes: MAC  Propofol        Ready For Surgery From Anesthesia Perspective.

## 2022-02-17 NOTE — BRIEF OP NOTE
GEOVANNI consent obtained   -No absolute contraindications of esophageal stricture, tumor, perforation, laceration,or diverticulum and/or active GI bleed  -The risks, benefits & alternatives of the procedure were explained to the patient.   -The risks of transesophageal echo include but are not limited to:  Dental trauma, esophageal trauma/perforation, bleeding, laryngospasm/brochospasm, aspiration, sore throat/hoarseness, & dislodgement of the endotracheal tube/nasogastric tube (where applicable).    -The risks of moderate sedation include hypotension, respiratory depression, arrhythmias, bronchospasm, & death.    -Informed consent was obtained. The patient is agreeable to proceed with the procedure and all questions and concerns addressed.    M'allampati score1 propofol used for sedation   GEOVANNI probe then passed  via  transesophageal route and  imaging performed in the  standard 3 views ie  Upper esophageal, ,mid esophageal, and transgastric views . Saline bubbles were injected IV for PFO imaging of   Intraatrial septum.   At end of procedure, GEOVANNI probe removed. The patient tolerated procedure well .    No  Clots r abnormaiities seen

## 2022-02-17 NOTE — HPI
C office progress note of 01/18/2022  Patient has a history of hypertension obstructive sleep apnea status post uvulectomy and asymptomatic atrial fibrillation  He was to have cardioversion 2 weeks ago but had COVID and is brought back in now for elective cardioversion  Denies any chest pain he has occasional chest pressure  He is not smoking

## 2022-02-17 NOTE — TELEPHONE ENCOUNTER
----- Message from Oneil Coughlin sent at 2/17/2022  1:59 PM CST -----  Contact: Shaneka  Type: Needs Medical Advice  Who Called: Patient wife Shaneka  Symptoms (please be specific):   How long has patient had these symptoms:    Pharmacy name and phone #:    Best Call Back Number: [phone]  Additional Information: Pt wife requesting a call back concerning the patient appt. Attempt to reschedule for a Friday in [place] but next available was not until May.

## 2022-02-24 ENCOUNTER — OFFICE VISIT (OUTPATIENT)
Dept: CARDIOLOGY | Facility: CLINIC | Age: 62
End: 2022-02-24
Payer: COMMERCIAL

## 2022-02-24 VITALS
BODY MASS INDEX: 33.11 KG/M2 | HEIGHT: 66 IN | HEART RATE: 112 BPM | WEIGHT: 206 LBS | DIASTOLIC BLOOD PRESSURE: 110 MMHG | SYSTOLIC BLOOD PRESSURE: 170 MMHG | OXYGEN SATURATION: 99 %

## 2022-02-24 DIAGNOSIS — E78.2 MIXED HYPERLIPIDEMIA: ICD-10-CM

## 2022-02-24 DIAGNOSIS — R00.0 TACHYCARDIA: ICD-10-CM

## 2022-02-24 DIAGNOSIS — G47.33 OSA (OBSTRUCTIVE SLEEP APNEA): ICD-10-CM

## 2022-02-24 DIAGNOSIS — I10 PRIMARY HYPERTENSION: ICD-10-CM

## 2022-02-24 DIAGNOSIS — I48.0 PAROXYSMAL ATRIAL FIBRILLATION: Primary | ICD-10-CM

## 2022-02-24 DIAGNOSIS — I48.91 ATRIAL FIBRILLATION, NEW ONSET: ICD-10-CM

## 2022-02-24 PROCEDURE — 3008F PR BODY MASS INDEX (BMI) DOCUMENTED: ICD-10-PCS | Mod: CPTII,S$GLB,, | Performed by: SPECIALIST

## 2022-02-24 PROCEDURE — 93010 ELECTROCARDIOGRAM REPORT: CPT | Mod: S$GLB,,, | Performed by: GENERAL PRACTICE

## 2022-02-24 PROCEDURE — 4010F ACE/ARB THERAPY RXD/TAKEN: CPT | Mod: CPTII,S$GLB,, | Performed by: SPECIALIST

## 2022-02-24 PROCEDURE — 4010F PR ACE/ARB THEARPY RXD/TAKEN: ICD-10-PCS | Mod: CPTII,S$GLB,, | Performed by: SPECIALIST

## 2022-02-24 PROCEDURE — 3080F PR MOST RECENT DIASTOLIC BLOOD PRESSURE >= 90 MM HG: ICD-10-PCS | Mod: CPTII,S$GLB,, | Performed by: SPECIALIST

## 2022-02-24 PROCEDURE — 93010 EKG 12-LEAD: ICD-10-PCS | Mod: S$GLB,,, | Performed by: GENERAL PRACTICE

## 2022-02-24 PROCEDURE — 1160F PR REVIEW ALL MEDS BY PRESCRIBER/CLIN PHARMACIST DOCUMENTED: ICD-10-PCS | Mod: CPTII,S$GLB,, | Performed by: SPECIALIST

## 2022-02-24 PROCEDURE — 93005 EKG 12-LEAD: ICD-10-PCS | Mod: S$GLB,,, | Performed by: SPECIALIST

## 2022-02-24 PROCEDURE — 1159F PR MEDICATION LIST DOCUMENTED IN MEDICAL RECORD: ICD-10-PCS | Mod: CPTII,S$GLB,, | Performed by: SPECIALIST

## 2022-02-24 PROCEDURE — 3008F BODY MASS INDEX DOCD: CPT | Mod: CPTII,S$GLB,, | Performed by: SPECIALIST

## 2022-02-24 PROCEDURE — 1160F RVW MEDS BY RX/DR IN RCRD: CPT | Mod: CPTII,S$GLB,, | Performed by: SPECIALIST

## 2022-02-24 PROCEDURE — 99214 OFFICE O/P EST MOD 30 MIN: CPT | Mod: S$GLB,,, | Performed by: SPECIALIST

## 2022-02-24 PROCEDURE — 93005 ELECTROCARDIOGRAM TRACING: CPT | Mod: S$GLB,,, | Performed by: SPECIALIST

## 2022-02-24 PROCEDURE — 3077F SYST BP >= 140 MM HG: CPT | Mod: CPTII,S$GLB,, | Performed by: SPECIALIST

## 2022-02-24 PROCEDURE — 1159F MED LIST DOCD IN RCRD: CPT | Mod: CPTII,S$GLB,, | Performed by: SPECIALIST

## 2022-02-24 PROCEDURE — 3077F PR MOST RECENT SYSTOLIC BLOOD PRESSURE >= 140 MM HG: ICD-10-PCS | Mod: CPTII,S$GLB,, | Performed by: SPECIALIST

## 2022-02-24 PROCEDURE — 3080F DIAST BP >= 90 MM HG: CPT | Mod: CPTII,S$GLB,, | Performed by: SPECIALIST

## 2022-02-24 PROCEDURE — 99214 PR OFFICE/OUTPT VISIT, EST, LEVL IV, 30-39 MIN: ICD-10-PCS | Mod: S$GLB,,, | Performed by: SPECIALIST

## 2022-02-24 NOTE — PROGRESS NOTES
Subjective:    Patient ID:  Rich Ballard is a 61 y.o. male who presents for   Hospital Follow Up    afib-     Back in  -  To see dr alcala for ablation    no  Cp or sob    pt anxious about ablation   No smoke    will go back on metop 50 bid    wants anxiolytic  rx xanax .125 qd   Patient is back in atrial fibrillation  Is very anxious about this  No chest pain blood pressures been a little bit elevated      Review of patient's allergies indicates:   Allergen Reactions    Amiodarone analogues Shortness Of Breath       Past Medical History:   Diagnosis Date    Allergy     Atrial fibrillation     Hyperlipidemia     Hypertension      Past Surgical History:   Procedure Laterality Date    APPENDECTOMY      CERVICAL FUSION      COLONOSCOPY N/A 2/1/2017    Procedure: COLONOSCOPY;  Surgeon: Kennedy Soto MD;  Location: [facility];  Service: Endoscopy;  Laterality: N/A;    ppp      ROTATOR CUFF REPAIR      TREATMENT OF CARDIAC ARRHYTHMIA N/A 2/17/2022    Procedure: CARDIOVERSION;  Surgeon: Kaleb Santana MD;  Location: [facility] CATH/EP LAB;  Service: Cardiology;  Laterality: N/A;    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       Social History     Tobacco Use    Smoking status: Current Every Day Smoker     Packs/day: 0.50     Types: Cigarettes    Smokeless tobacco: Never Used   Substance Use Topics    Alcohol use: No     Comment: very rarely    Drug use: No        Review of Systems     ROS        Objective:        Vitals:    02/24/22 1657   BP: (!) 170/110   Pulse: (!) 112       Lab Results   Component Value Date    WBC 5.49 02/17/2022    HGB 15.3 02/17/2022    HCT 47.8 02/17/2022     02/17/2022    CHOL 217 (H) 07/07/2021    TRIG 110 07/07/2021    HDL 39 (L) 07/07/2021    ALT 27 01/27/2022    AST 22 01/27/2022     02/17/2022    K 4.5 02/17/2022     02/17/2022    CREATININE 0.6 02/17/2022    BUN 14 02/17/2022    CO2 24 02/17/2022    TSH 1.183 12/23/2016    PSA 3.4 07/07/2021    INR 1.2 01/27/2022         ECHOCARDIOGRAM RESULTS  Results for orders placed during the hospital encounter of 02/17/22    Transesophageal echo (GEOVANNI) with possible cardioversion    Interpretation Summary  · The left ventricle is normal in size with normal systolic function.  · The estimated ejection fraction is 60%.  · Atrial fibrillation observed.  · Normal right ventricular size with normal right ventricular systolic function.  · Normal appearing left atrial appendage. No thrombus is present in the appendage. ELSY occluder is absent. Normal appendage velocities.  · No interatrial septal defect present.  · Mild mitral regurgitation.  · There is no pulmonary hypertension.  · Mild tricuspid regurgitation.  · Date atrial appendage was normal with no clot      CURRENT/PREVIOUS VISIT EKG  Results for orders placed or performed during the hospital encounter of 02/17/22   EKG 12-lead    Collection Time: 02/17/22  9:28 AM    Narrative    Test Reason : I48.0,    Vent. Rate : 073 BPM     Atrial Rate : 073 BPM     P-R Int : 184 ms          QRS Dur : 080 ms      QT Int : 396 ms       P-R-T Axes : 071 066 072 degrees     QTc Int : 436 ms    Normal sinus rhythm  Normal ECG  When compared with ECG of 17-FEB-2022 06:49,  Sinus rhythm has replaced Atrial fibrillation  Vent. rate has decreased BY  39 BPM  Confirmed by Max TRINH, Kaleb GUERRERO ([id number]) on 2/20/2022 6:26:02 PM    Referred By:             Confirmed By:Kaleb Santana MD     Results for orders placed during the hospital encounter of 01/20/22    Echo    Interpretation Summary  · The left ventricle is normal in size with concentric remodeling and normal systolic function.  · The estimated ejection fraction is 65%.  · A diastolic pattern consistent with atrial fibrillation observed.  · With normal left atrial pressure.  · Normal right ventricular size with normal right ventricular systolic function.  · Mild mitral regurgitation.  · Normal central venous pressure (3 mmHg).  · The estimated PA systolic  pressure is 27 mmHg.    Results for orders placed in visit on 01/24/22    Nuclear Stress Test    Interpretation Summary    The EKG portion of this study is negative for ischemia.    The patient reported no chest pain during the stress test.    During stress, atrial fibrillation is noted.    The nuclear portion of this study will be reported separately.      PHYSICAL EXAM    Physical Exam of irregular blood pressure 140/84  Pulses irregular  Lungs are  Abdomen obese    Medication List with Changes/Refills   Current Medications    AMLODIPINE (NORVASC) 10 MG TABLET    TAKE 1 TABLET BY MOUTH ONCE DAILY IN THE EVENING    APIXABAN (ELIQUIS) 5 MG TAB    Take 1 tablet (5 mg total) by mouth once daily.    BENAZEPRIL (LOTENSIN) 40 MG TABLET    TAKE 1 TABLET BY MOUTH ONCE DAILY IN THE EVENING    FLUTICASONE PROPIONATE (FLONASE) 50 MCG/ACTUATION NASAL SPRAY    2 sprays (100 mcg total) by Each Nostril route once daily.   Discontinued Medications    SOTALOL (BETAPACE) 80 MG TABLET    Take 1 tablet (80 mg total) by mouth 2 (two) times daily.           Assessment:     anxiety  1. Paroxysmal atrial fibrillation    2. Atrial fibrillation, new onset    3. Mixed hyperlipidemia    4. Primary hypertension    5. Tachycardia         Plan:     Rx annex 0.125 q.day p.r.n.  Go back on metoprolol 50 b.i.d.  CP specialist for ablation  Problem List Items Addressed This Visit        Cardiac/Vascular    Hypertension    Hyperlipidemia    Tachycardia    Relevant Orders    IN OFFICE EKG 12-LEAD (to [place])    Ambulatory referral/consult to Electrophysiology    Atrial fibrillation, new onset    Relevant Orders    IN OFFICE EKG 12-LEAD (to [place])    Ambulatory referral/consult to Electrophysiology      Other Visit Diagnoses     Paroxysmal atrial fibrillation    -  Primary    Relevant Orders    IN OFFICE EKG 12-LEAD (to [place])    Ambulatory referral/consult to Electrophysiology           No follow-ups on file.

## 2022-03-07 ENCOUNTER — PATIENT OUTREACH (OUTPATIENT)
Dept: ADMINISTRATIVE | Facility: OTHER | Age: 62
End: 2022-03-07
Payer: COMMERCIAL

## 2022-03-07 NOTE — H&P (VIEW-ONLY)
Chart was reviewed for overdue Proactive [facility] Encounters ([facility])  topics  Updates were requested from care everywhere  Health Maintenance has been updated  [place] immunization registry triggered

## 2022-03-08 NOTE — PROGRESS NOTES
Subjective:     HPI    I had the pleasure of seeing Rich Ballard in consultation at your request for the evaluation of atrial fibrillation. He is a 61M merchant marine with HTN, HLD, former tobacco use (quit in 2/2022, 30 pack-years), who was incidentally found to be in AF on a [employer] Physical in 1/2022. This led to a cardiac work-up including an echo (1/2022, EF of 65%) and a 24 hour Holter (1/2022, persistent AF with an average HR of 114 bpm).     In 2/2022 a GEOVANNI/DCCV was performed, restoring sinus rhythm. He was discharged on eliquis but not an antiarrhythmic. He thinks he maintained sinus rhythm for several days. On 1 week follow-up ECG he was back in AF. He presents to me today to discuss management options.    Pulse 74 bpm in the office today.    Current Outpatient Medications on File Prior to Visit   Medication Sig Dispense Refill    amLODIPine (NORVASC) 10 MG tablet TAKE 1 TABLET BY MOUTH ONCE DAILY IN THE EVENING 90 tablet 1    apixaban (ELIQUIS) 5 mg Tab Take 1 tablet (5 mg total) by mouth once daily. 30 tablet 2    benazepriL (LOTENSIN) 40 MG tablet TAKE 1 TABLET BY MOUTH ONCE DAILY IN THE EVENING 90 tablet 1    fluticasone propionate (FLONASE) 50 mcg/actuation nasal spray 2 sprays (100 mcg total) by Each Nostril route once daily. 16 g 5    metoprolol succinate (TOPROL-XL) 50 MG 24 hr tablet Take 50 mg by mouth 2 (two) times daily.       No current facility-administered medications on file prior to visit.       Review of Systems   Constitutional: Negative for decreased appetite, malaise/fatigue, weight gain and weight loss.   HENT: Negative for sore throat.    Eyes: Negative for blurred vision.   Cardiovascular: Negative for chest pain, dyspnea on exertion, irregular heartbeat, leg swelling, near-syncope, orthopnea, palpitations, paroxysmal nocturnal dyspnea and syncope.   Respiratory: Negative for shortness of breath.    Skin: Negative for rash.   Musculoskeletal: Negative for arthritis.    Gastrointestinal: Negative for abdominal pain.   Neurological: Negative for focal weakness.   Psychiatric/Behavioral: Negative for altered mental status.   All other systems reviewed and are negative.       Objective:    Physical Exam  Vitals and nursing note reviewed.   Constitutional:       General: He is not in acute distress.     Appearance: He is well-developed.   HENT:      Head: Normocephalic and atraumatic.   Eyes:      General: No scleral icterus.     Pupils: Pupils are equal, round, and reactive to light.   Neck:      Thyroid: No thyromegaly.   Cardiovascular:      Rate and Rhythm: Normal rate. Rhythm irregular.      Pulses: Normal pulses.      Heart sounds: Normal heart sounds. No murmur heard.    No friction rub. No gallop.   Pulmonary:      Effort: Pulmonary effort is normal.      Breath sounds: Normal breath sounds.   Abdominal:      General: Bowel sounds are normal. There is no distension.      Palpations: Abdomen is soft.      Tenderness: There is no abdominal tenderness.   Musculoskeletal:      Cervical back: Neck supple.   Skin:     General: Skin is warm and dry.      Findings: No rash.   Neurological:      Mental Status: He is alert and oriented to person, place, and time.   Psychiatric:         Behavior: Behavior normal.       Vitals:    03/09/22 1109   BP: 136/84   Pulse: 74           Assessment:       1. Atrial fibrillation, new onset    2. Primary hypertension    3. Mixed hyperlipidemia         Plan:       In summary, Rich Ballard is a 61M with symptomatic persistent AF. His [id number] Score is 1 so he should continue eliquis for now.    We discussed in detail the pathophysiology of AF as well as the myriad of treatment options available to manage it including antiarrhythmics and catheter ablation. We specifically discussed the risks, benefits, indications, and alternatives to PVI. Risks discussed include bleeding, hematoma, vascular damage, cardiac tamponade, stroke, PV stenosis, AE  fistula, phrenic nerve damage, and death.  After considering his options he has decided to proceed.     CARTO. Hold eliquis AM of procedure. GEOVANNI AM of procedure--cancel if sinus.Post-procedure will start a brief course of sotalol 80 mg bid.     Thank you for allowing me to participate in the care of this patient. Please do not hesitate to call me with any questions or concerns.

## 2022-03-09 ENCOUNTER — OFFICE VISIT (OUTPATIENT)
Dept: CARDIOLOGY | Facility: CLINIC | Age: 62
End: 2022-03-09
Payer: COMMERCIAL

## 2022-03-09 VITALS
HEIGHT: 66 IN | OXYGEN SATURATION: 99 % | HEART RATE: 74 BPM | BODY MASS INDEX: 33.11 KG/M2 | WEIGHT: 206 LBS | SYSTOLIC BLOOD PRESSURE: 136 MMHG | DIASTOLIC BLOOD PRESSURE: 84 MMHG

## 2022-03-09 DIAGNOSIS — E78.2 MIXED HYPERLIPIDEMIA: ICD-10-CM

## 2022-03-09 DIAGNOSIS — I48.91 ATRIAL FIBRILLATION, NEW ONSET: Primary | ICD-10-CM

## 2022-03-09 DIAGNOSIS — I10 PRIMARY HYPERTENSION: ICD-10-CM

## 2022-03-09 PROCEDURE — 4010F PR ACE/ARB THEARPY RXD/TAKEN: ICD-10-PCS | Mod: CPTII,S$GLB,, | Performed by: INTERNAL MEDICINE

## 2022-03-09 PROCEDURE — 3075F PR MOST RECENT SYSTOLIC BLOOD PRESS GE 130-139MM HG: ICD-10-PCS | Mod: CPTII,S$GLB,, | Performed by: INTERNAL MEDICINE

## 2022-03-09 PROCEDURE — 1159F MED LIST DOCD IN RCRD: CPT | Mod: CPTII,S$GLB,, | Performed by: INTERNAL MEDICINE

## 2022-03-09 PROCEDURE — 99205 PR OFFICE/OUTPT VISIT, NEW, LEVL V, 60-74 MIN: ICD-10-PCS | Mod: S$GLB,,, | Performed by: INTERNAL MEDICINE

## 2022-03-09 PROCEDURE — 3008F BODY MASS INDEX DOCD: CPT | Mod: CPTII,S$GLB,, | Performed by: INTERNAL MEDICINE

## 2022-03-09 PROCEDURE — 1159F PR MEDICATION LIST DOCUMENTED IN MEDICAL RECORD: ICD-10-PCS | Mod: CPTII,S$GLB,, | Performed by: INTERNAL MEDICINE

## 2022-03-09 PROCEDURE — 3008F PR BODY MASS INDEX (BMI) DOCUMENTED: ICD-10-PCS | Mod: CPTII,S$GLB,, | Performed by: INTERNAL MEDICINE

## 2022-03-09 PROCEDURE — 99205 OFFICE O/P NEW HI 60 MIN: CPT | Mod: S$GLB,,, | Performed by: INTERNAL MEDICINE

## 2022-03-09 PROCEDURE — 3079F DIAST BP 80-89 MM HG: CPT | Mod: CPTII,S$GLB,, | Performed by: INTERNAL MEDICINE

## 2022-03-09 PROCEDURE — 4010F ACE/ARB THERAPY RXD/TAKEN: CPT | Mod: CPTII,S$GLB,, | Performed by: INTERNAL MEDICINE

## 2022-03-09 PROCEDURE — 3079F PR MOST RECENT DIASTOLIC BLOOD PRESSURE 80-89 MM HG: ICD-10-PCS | Mod: CPTII,S$GLB,, | Performed by: INTERNAL MEDICINE

## 2022-03-09 PROCEDURE — 3075F SYST BP GE 130 - 139MM HG: CPT | Mod: CPTII,S$GLB,, | Performed by: INTERNAL MEDICINE

## 2022-03-09 RX ORDER — METOPROLOL SUCCINATE 50 MG/1
50 TABLET, EXTENDED RELEASE ORAL 2 TIMES DAILY
COMMUNITY
End: 2022-04-01

## 2022-03-10 ENCOUNTER — TELEPHONE (OUTPATIENT)
Dept: ELECTROPHYSIOLOGY | Facility: CLINIC | Age: 62
End: 2022-03-10
Payer: COMMERCIAL

## 2022-03-10 NOTE — TELEPHONE ENCOUNTER
Attempted to reach pt to schedule PVI ablation, no answer. LVM with number for pt to return call.

## 2022-03-11 ENCOUNTER — PATIENT MESSAGE (OUTPATIENT)
Dept: ELECTROPHYSIOLOGY | Facility: CLINIC | Age: 62
End: 2022-03-11
Payer: COMMERCIAL

## 2022-03-11 DIAGNOSIS — I48.0 PAROXYSMAL ATRIAL FIBRILLATION: Primary | ICD-10-CM

## 2022-03-11 DIAGNOSIS — Z01.818 PRE-OP TESTING: ICD-10-CM

## 2022-04-01 ENCOUNTER — LAB VISIT (OUTPATIENT)
Dept: LAB | Facility: HOSPITAL | Age: 62
End: 2022-04-01
Attending: INTERNAL MEDICINE
Payer: COMMERCIAL

## 2022-04-01 DIAGNOSIS — I10 ESSENTIAL HYPERTENSION: ICD-10-CM

## 2022-04-01 DIAGNOSIS — I48.0 PAROXYSMAL ATRIAL FIBRILLATION: ICD-10-CM

## 2022-04-01 DIAGNOSIS — Z01.818 PRE-OP TESTING: ICD-10-CM

## 2022-04-01 LAB
ANION GAP SERPL CALC-SCNC: 9 MMOL/L (ref 8–16)
APTT BLDCRRT: 29.6 SEC (ref 21–32)
BASOPHILS # BLD AUTO: 0.02 K/UL (ref 0–0.2)
BASOPHILS NFR BLD: 0.3 % (ref 0–1.9)
BUN SERPL-MCNC: 15 MG/DL (ref 8–23)
CALCIUM SERPL-MCNC: 9.2 MG/DL (ref 8.7–10.5)
CHLORIDE SERPL-SCNC: 105 MMOL/L (ref 95–110)
CO2 SERPL-SCNC: 26 MMOL/L (ref 23–29)
CREAT SERPL-MCNC: 0.8 MG/DL (ref 0.5–1.4)
DIFFERENTIAL METHOD: ABNORMAL
EOSINOPHIL # BLD AUTO: 0.2 K/UL (ref 0–0.5)
EOSINOPHIL NFR BLD: 2.8 % (ref 0–8)
ERYTHROCYTE [DISTWIDTH] IN BLOOD BY AUTOMATED COUNT: 13.1 % (ref 11.5–14.5)
EST. GFR  (AFRICAN AMERICAN): >60 ML/MIN/1.73 M^2
EST. GFR  (NON AFRICAN AMERICAN): >60 ML/MIN/1.73 M^2
GLUCOSE SERPL-MCNC: 104 MG/DL (ref 70–110)
HCT VFR BLD AUTO: 47.4 % (ref 40–54)
HGB BLD-MCNC: 15.6 G/DL (ref 14–18)
IMM GRANULOCYTES # BLD AUTO: 0.01 K/UL (ref 0–0.04)
IMM GRANULOCYTES NFR BLD AUTO: 0.2 % (ref 0–0.5)
INR PPP: 1 (ref 0.8–1.2)
LYMPHOCYTES # BLD AUTO: 2.2 K/UL (ref 1–4.8)
LYMPHOCYTES NFR BLD: 33.8 % (ref 18–48)
MCH RBC QN AUTO: 30.9 PG (ref 27–31)
MCHC RBC AUTO-ENTMCNC: 32.9 G/DL (ref 32–36)
MCV RBC AUTO: 94 FL (ref 82–98)
MONOCYTES # BLD AUTO: 0.6 K/UL (ref 0.3–1)
MONOCYTES NFR BLD: 9.7 % (ref 4–15)
NEUTROPHILS # BLD AUTO: 3.4 K/UL (ref 1.8–7.7)
NEUTROPHILS NFR BLD: 53.2 % (ref 38–73)
NRBC BLD-RTO: 0 /100 WBC
PLATELET # BLD AUTO: 257 K/UL (ref 150–450)
PMV BLD AUTO: 8.6 FL (ref 9.2–12.9)
POTASSIUM SERPL-SCNC: 4.5 MMOL/L (ref 3.5–5.1)
PROTHROMBIN TIME: 10.6 SEC (ref 9–12.5)
RBC # BLD AUTO: 5.05 M/UL (ref 4.6–6.2)
SODIUM SERPL-SCNC: 140 MMOL/L (ref 136–145)
WBC # BLD AUTO: 6.42 K/UL (ref 3.9–12.7)

## 2022-04-01 PROCEDURE — 85730 THROMBOPLASTIN TIME PARTIAL: CPT | Performed by: INTERNAL MEDICINE

## 2022-04-01 PROCEDURE — 80048 BASIC METABOLIC PNL TOTAL CA: CPT | Performed by: INTERNAL MEDICINE

## 2022-04-01 PROCEDURE — 36415 COLL VENOUS BLD VENIPUNCTURE: CPT | Performed by: INTERNAL MEDICINE

## 2022-04-01 PROCEDURE — 85610 PROTHROMBIN TIME: CPT | Performed by: INTERNAL MEDICINE

## 2022-04-01 PROCEDURE — 85025 COMPLETE CBC W/AUTO DIFF WBC: CPT | Performed by: INTERNAL MEDICINE

## 2022-04-01 RX ORDER — METOPROLOL SUCCINATE 50 MG/1
TABLET, EXTENDED RELEASE ORAL
Qty: 90 TABLET | Refills: 1 | Status: ON HOLD | OUTPATIENT
Start: 2022-04-01 | End: 2022-04-05 | Stop reason: SDUPTHER

## 2022-04-01 RX ORDER — BENAZEPRIL HYDROCHLORIDE 40 MG/1
TABLET ORAL
Qty: 90 TABLET | Refills: 1 | Status: SHIPPED | OUTPATIENT
Start: 2022-04-01 | End: 2022-07-06

## 2022-04-01 NOTE — TELEPHONE ENCOUNTER
This Rx Request does not qualify for assessment with the ORC   Please review protocol details and the Care Due Message for extra clinical information    Reasons Rx Request may be deferred:  Patient has been seen in the ED/Hospital since the last PCP visit    Note composed:12:15 PM 04/01/2022

## 2022-04-01 NOTE — TELEPHONE ENCOUNTER
This Rx Request does not qualify for assessment with the ORC   Please review protocol details and the Care Due Message for extra clinical information    Reasons Rx Request may be deferred:  Patient has been seen in the ED/Hospital since the last PCP visit    Note composed:12:14 PM 04/01/2022

## 2022-04-01 NOTE — TELEPHONE ENCOUNTER
Care Due:                  Date            Visit Type   Department     Provider  --------------------------------------------------------------------------------                                NP -                              PRIMARY      [facility]  Last Visit: 06-      CARE ([facility])   MEDICINE       Piotr Gautam  Next Visit: None Scheduled  None         None Found                                                            Last  Test          Frequency    Reason                     Performed    Due Date  --------------------------------------------------------------------------------    Office Visit  12 months..  benazepriL...............  06- 06-    Powered by [employer]. Reference number: [id number].   4/01/2022 12:11:40 PM CDT

## 2022-04-04 ENCOUNTER — TELEPHONE (OUTPATIENT)
Dept: ELECTROPHYSIOLOGY | Facility: CLINIC | Age: 62
End: 2022-04-04
Payer: COMMERCIAL

## 2022-04-04 DIAGNOSIS — I48.91 ATRIAL FIBRILLATION, NEW ONSET: ICD-10-CM

## 2022-04-04 DIAGNOSIS — I10 ESSENTIAL HYPERTENSION: ICD-10-CM

## 2022-04-04 RX ORDER — AMLODIPINE BESYLATE 10 MG/1
10 TABLET ORAL NIGHTLY
Qty: 90 TABLET | Refills: 1 | Status: SHIPPED | OUTPATIENT
Start: 2022-04-04 | End: 2022-07-06

## 2022-04-04 NOTE — TELEPHONE ENCOUNTER
Returned call to pt. Pre op instructions reviewed. Advised NPO after MN, no eliquis in the morning, can take all other meds with a small sip of H2O. Pt stated he would be here at 9 am      ----- Message from Neal Siddiqui MA sent at 4/4/2022 11:31 AM CDT -----  Regarding: FW: returninga  call  Good morning Ami   See below please advise.   Thanks    ----- Message -----  From: Yudelka Cunha  Sent: 4/4/2022  11:23 AM CDT  To: [facility] Pope Staff  Subject: returninga  call                                 The pt is returning a call. Please call him back [phone]. Thanks, Yudelka

## 2022-04-04 NOTE — TELEPHONE ENCOUNTER
No new care gaps identified.  Powered by [employer]. Reference number: [id number].   4/04/2022 9:07:26 AM CDT

## 2022-04-04 NOTE — TELEPHONE ENCOUNTER
Attempted to reach pt to confirm procedure, no answer. LVM CONFIRMing procedure arrival time of 4/05/2022 at 9am    Reiterated instructions including:  -Directions to check in desk  -NPO after midnight night prior to procedure  -High importance of HOLDING Eliquis   -Pre-procedure LABS Completed and reviewed.  -COVID vaccines documented and pt positive for covid on 1/27/2022  -Reviewed current visitor policy  - LVM with number for pt to call with any questions    Patient verbalizes understanding of above and appreciates call.

## 2022-04-05 ENCOUNTER — ANESTHESIA EVENT (OUTPATIENT)
Dept: MEDSURG UNIT | Facility: HOSPITAL | Age: 62
End: 2022-04-05
Payer: COMMERCIAL

## 2022-04-05 ENCOUNTER — HOSPITAL ENCOUNTER (OUTPATIENT)
Dept: CARDIOLOGY | Facility: HOSPITAL | Age: 62
Discharge: HOME | End: 2022-04-05
Attending: INTERNAL MEDICINE | Admitting: INTERNAL MEDICINE
Payer: COMMERCIAL

## 2022-04-05 ENCOUNTER — ANESTHESIA (OUTPATIENT)
Dept: MEDSURG UNIT | Facility: HOSPITAL | Age: 62
End: 2022-04-05
Payer: COMMERCIAL

## 2022-04-05 ENCOUNTER — HOSPITAL ENCOUNTER (OUTPATIENT)
Facility: HOSPITAL | Age: 62
Discharge: HOME | End: 2022-04-05
Attending: INTERNAL MEDICINE | Admitting: INTERNAL MEDICINE
Payer: COMMERCIAL

## 2022-04-05 VITALS
HEIGHT: 66 IN | DIASTOLIC BLOOD PRESSURE: 88 MMHG | HEART RATE: 87 BPM | OXYGEN SATURATION: 97 % | WEIGHT: 208 LBS | BODY MASS INDEX: 33.43 KG/M2 | RESPIRATION RATE: 16 BRPM | SYSTOLIC BLOOD PRESSURE: 137 MMHG | TEMPERATURE: 98 F

## 2022-04-05 VITALS
WEIGHT: 208 LBS | BODY MASS INDEX: 33.43 KG/M2 | SYSTOLIC BLOOD PRESSURE: 131 MMHG | DIASTOLIC BLOOD PRESSURE: 88 MMHG | HEIGHT: 66 IN

## 2022-04-05 DIAGNOSIS — Z01.818 PRE-OP TESTING: ICD-10-CM

## 2022-04-05 DIAGNOSIS — I48.0 PAROXYSMAL ATRIAL FIBRILLATION: ICD-10-CM

## 2022-04-05 DIAGNOSIS — I48.19 PERSISTENT ATRIAL FIBRILLATION: ICD-10-CM

## 2022-04-05 DIAGNOSIS — Z98.890 S/P ABLATION OPERATION FOR ARRHYTHMIA: ICD-10-CM

## 2022-04-05 DIAGNOSIS — Z79.899 ENCOUNTER FOR MONITORING SOTALOL THERAPY: Primary | ICD-10-CM

## 2022-04-05 DIAGNOSIS — Z98.890 S/P ABLATION OF ATRIAL FIBRILLATION: ICD-10-CM

## 2022-04-05 DIAGNOSIS — I49.9 ARRHYTHMIA: ICD-10-CM

## 2022-04-05 DIAGNOSIS — I48.91 ATRIAL FIBRILLATION: ICD-10-CM

## 2022-04-05 DIAGNOSIS — Z51.81 ENCOUNTER FOR MONITORING SOTALOL THERAPY: Primary | ICD-10-CM

## 2022-04-05 DIAGNOSIS — Z86.79 S/P ABLATION OF ATRIAL FIBRILLATION: ICD-10-CM

## 2022-04-05 DIAGNOSIS — Z86.79 S/P ABLATION OPERATION FOR ARRHYTHMIA: ICD-10-CM

## 2022-04-05 LAB
ASCENDING AORTA: 3.4 CM
BSA FOR ECHO PROCEDURE: 2.1 M2
EJECTION FRACTION: 45 %
SINUS: 3.7 CM
STJ: 3.4 CM

## 2022-04-05 PROCEDURE — 93325 DOPPLER ECHO COLOR FLOW MAPG: CPT

## 2022-04-05 PROCEDURE — D9220A PRA ANESTHESIA: ICD-10-PCS | Mod: CRNA,,, | Performed by: STUDENT IN AN ORGANIZED HEALTH CARE EDUCATION/TRAINING PROGRAM

## 2022-04-05 PROCEDURE — 93657 PR TX L/R ATRIAL FIB ADDL: ICD-10-PCS | Mod: ,,, | Performed by: INTERNAL MEDICINE

## 2022-04-05 PROCEDURE — 25000003 PHARM REV CODE 250: Performed by: INTERNAL MEDICINE

## 2022-04-05 PROCEDURE — C1731 CATH, EP, 20 OR MORE ELEC: HCPCS | Performed by: INTERNAL MEDICINE

## 2022-04-05 PROCEDURE — 93312 ECHO TRANSESOPHAGEAL: CPT | Mod: 26,,, | Performed by: INTERNAL MEDICINE

## 2022-04-05 PROCEDURE — C1766 INTRO/SHEATH,STRBLE,NON-PEEL: HCPCS | Performed by: INTERNAL MEDICINE

## 2022-04-05 PROCEDURE — 37000008 HC ANESTHESIA 1ST 15 MINUTES: Performed by: INTERNAL MEDICINE

## 2022-04-05 PROCEDURE — 92960 CARDIOVERSION ELECTRIC EXT: CPT | Performed by: INTERNAL MEDICINE

## 2022-04-05 PROCEDURE — C1753 CATH, INTRAVAS ULTRASOUND: HCPCS | Performed by: INTERNAL MEDICINE

## 2022-04-05 PROCEDURE — 93656 COMPRE EP EVAL ABLTJ ATR FIB: CPT | Mod: ,,, | Performed by: INTERNAL MEDICINE

## 2022-04-05 PROCEDURE — 63600175 PHARM REV CODE 636 W HCPCS: Performed by: NURSE ANESTHETIST, CERTIFIED REGISTERED

## 2022-04-05 PROCEDURE — 93656 PR ELECTROPHYS EVAL, COMPREHEN, W/ABLATION OF ATRIAL FIBR: ICD-10-PCS | Mod: ,,, | Performed by: INTERNAL MEDICINE

## 2022-04-05 PROCEDURE — C1732 CATH, EP, DIAG/ABL, 3D/VECT: HCPCS | Performed by: INTERNAL MEDICINE

## 2022-04-05 PROCEDURE — 93320 TRANSESOPHAGEAL ECHO (TEE) (CUPID ONLY): ICD-10-PCS | Mod: 26,,, | Performed by: INTERNAL MEDICINE

## 2022-04-05 PROCEDURE — 93312 TRANSESOPHAGEAL ECHO (TEE) (CUPID ONLY): ICD-10-PCS | Mod: 26,,, | Performed by: INTERNAL MEDICINE

## 2022-04-05 PROCEDURE — 27201423 OPTIME MED/SURG SUP & DEVICES STERILE SUPPLY: Performed by: INTERNAL MEDICINE

## 2022-04-05 PROCEDURE — D9220A PRA ANESTHESIA: Mod: CRNA,,, | Performed by: STUDENT IN AN ORGANIZED HEALTH CARE EDUCATION/TRAINING PROGRAM

## 2022-04-05 PROCEDURE — 92960 CARDIOVERSION ELECTRIC EXT: CPT | Mod: 59,,, | Performed by: INTERNAL MEDICINE

## 2022-04-05 PROCEDURE — 93010 EKG 12-LEAD: ICD-10-PCS | Mod: ,,, | Performed by: INTERNAL MEDICINE

## 2022-04-05 PROCEDURE — 93005 ELECTROCARDIOGRAM TRACING: CPT | Mod: 59

## 2022-04-05 PROCEDURE — 25000003 PHARM REV CODE 250: Performed by: STUDENT IN AN ORGANIZED HEALTH CARE EDUCATION/TRAINING PROGRAM

## 2022-04-05 PROCEDURE — D9220A PRA ANESTHESIA: ICD-10-PCS | Mod: ANES,,, | Performed by: ANESTHESIOLOGY

## 2022-04-05 PROCEDURE — 93657 TX L/R ATRIAL FIB ADDL: CPT | Mod: ,,, | Performed by: INTERNAL MEDICINE

## 2022-04-05 PROCEDURE — 93325 TRANSESOPHAGEAL ECHO (TEE) (CUPID ONLY): ICD-10-PCS | Mod: 26,,, | Performed by: INTERNAL MEDICINE

## 2022-04-05 PROCEDURE — 93325 DOPPLER ECHO COLOR FLOW MAPG: CPT | Mod: 26,,, | Performed by: INTERNAL MEDICINE

## 2022-04-05 PROCEDURE — 93010 ELECTROCARDIOGRAM REPORT: CPT | Mod: ,,, | Performed by: INTERNAL MEDICINE

## 2022-04-05 PROCEDURE — C1894 INTRO/SHEATH, NON-LASER: HCPCS | Performed by: INTERNAL MEDICINE

## 2022-04-05 PROCEDURE — C1730 CATH, EP, 19 OR FEW ELECT: HCPCS | Performed by: INTERNAL MEDICINE

## 2022-04-05 PROCEDURE — 63600175 PHARM REV CODE 636 W HCPCS: Performed by: INTERNAL MEDICINE

## 2022-04-05 PROCEDURE — 37000009 HC ANESTHESIA EA ADD 15 MINS: Performed by: INTERNAL MEDICINE

## 2022-04-05 PROCEDURE — 93657 TX L/R ATRIAL FIB ADDL: CPT | Performed by: INTERNAL MEDICINE

## 2022-04-05 PROCEDURE — 93656 COMPRE EP EVAL ABLTJ ATR FIB: CPT | Performed by: INTERNAL MEDICINE

## 2022-04-05 PROCEDURE — D9220A PRA ANESTHESIA: Mod: ANES,,, | Performed by: ANESTHESIOLOGY

## 2022-04-05 PROCEDURE — 63600175 PHARM REV CODE 636 W HCPCS: Performed by: STUDENT IN AN ORGANIZED HEALTH CARE EDUCATION/TRAINING PROGRAM

## 2022-04-05 PROCEDURE — 92960 PR CARDIOVERSION, ELECTIVE;EXTERN: ICD-10-PCS | Mod: 59,,, | Performed by: INTERNAL MEDICINE

## 2022-04-05 PROCEDURE — 93320 DOPPLER ECHO COMPLETE: CPT | Mod: 26,,, | Performed by: INTERNAL MEDICINE

## 2022-04-05 RX ORDER — ROCURONIUM BROMIDE 10 MG/ML
INJECTION, SOLUTION INTRAVENOUS
Status: DISCONTINUED | OUTPATIENT
Start: 2022-04-05 | End: 2022-04-05

## 2022-04-05 RX ORDER — VASOPRESSIN 20 [USP'U]/ML
INJECTION, SOLUTION INTRAMUSCULAR; SUBCUTANEOUS
Status: DISCONTINUED | OUTPATIENT
Start: 2022-04-05 | End: 2022-04-05

## 2022-04-05 RX ORDER — SUCCINYLCHOLINE CHLORIDE 20 MG/ML
INJECTION INTRAMUSCULAR; INTRAVENOUS
Status: DISCONTINUED | OUTPATIENT
Start: 2022-04-05 | End: 2022-04-05

## 2022-04-05 RX ORDER — SODIUM CHLORIDE 9 MG/ML
INJECTION, SOLUTION INTRAVENOUS CONTINUOUS PRN
Status: DISCONTINUED | OUTPATIENT
Start: 2022-04-05 | End: 2022-04-05

## 2022-04-05 RX ORDER — FENTANYL CITRATE 50 UG/ML
INJECTION, SOLUTION INTRAMUSCULAR; INTRAVENOUS
Status: DISCONTINUED | OUTPATIENT
Start: 2022-04-05 | End: 2022-04-05

## 2022-04-05 RX ORDER — LIDOCAINE HCL/PF 100 MG/5ML
SYRINGE (ML) INTRAVENOUS
Status: DISCONTINUED | OUTPATIENT
Start: 2022-04-05 | End: 2022-04-05

## 2022-04-05 RX ORDER — HEPARIN SOD,PORCINE/0.9 % NACL 1000/500ML
INTRAVENOUS SOLUTION INTRAVENOUS
Status: DISCONTINUED | OUTPATIENT
Start: 2022-04-05 | End: 2022-04-05 | Stop reason: HOSPADM

## 2022-04-05 RX ORDER — LIDOCAINE HYDROCHLORIDE 20 MG/ML
INJECTION, SOLUTION INFILTRATION; PERINEURAL
Status: DISCONTINUED | OUTPATIENT
Start: 2022-04-05 | End: 2022-04-05 | Stop reason: HOSPADM

## 2022-04-05 RX ORDER — ONDANSETRON 2 MG/ML
INJECTION INTRAMUSCULAR; INTRAVENOUS
Status: DISCONTINUED | OUTPATIENT
Start: 2022-04-05 | End: 2022-04-05

## 2022-04-05 RX ORDER — HEPARIN SODIUM 10000 [USP'U]/100ML
INJECTION, SOLUTION INTRAVENOUS CONTINUOUS PRN
Status: DISCONTINUED | OUTPATIENT
Start: 2022-04-05 | End: 2022-04-05

## 2022-04-05 RX ORDER — PROPOFOL 10 MG/ML
VIAL (ML) INTRAVENOUS
Status: DISCONTINUED | OUTPATIENT
Start: 2022-04-05 | End: 2022-04-05

## 2022-04-05 RX ORDER — SOTALOL HYDROCHLORIDE 80 MG/1
80 TABLET ORAL 2 TIMES DAILY
Qty: 60 TABLET | Refills: 11 | Status: SHIPPED | OUTPATIENT
Start: 2022-04-05 | End: 2022-05-06 | Stop reason: SDUPTHER

## 2022-04-05 RX ORDER — PHENYLEPHRINE HYDROCHLORIDE 10 MG/ML
INJECTION INTRAVENOUS
Status: DISCONTINUED | OUTPATIENT
Start: 2022-04-05 | End: 2022-04-05

## 2022-04-05 RX ORDER — PROTAMINE SULFATE 10 MG/ML
INJECTION, SOLUTION INTRAVENOUS
Status: DISCONTINUED | OUTPATIENT
Start: 2022-04-05 | End: 2022-04-05

## 2022-04-05 RX ORDER — DEXAMETHASONE SODIUM PHOSPHATE 4 MG/ML
INJECTION, SOLUTION INTRA-ARTICULAR; INTRALESIONAL; INTRAMUSCULAR; INTRAVENOUS; SOFT TISSUE
Status: DISCONTINUED | OUTPATIENT
Start: 2022-04-05 | End: 2022-04-05

## 2022-04-05 RX ORDER — FUROSEMIDE 20 MG/1
20 TABLET ORAL DAILY PRN
Qty: 5 TABLET | Refills: 0 | Status: SHIPPED | OUTPATIENT
Start: 2022-04-05 | End: 2023-03-22

## 2022-04-05 RX ORDER — FENTANYL CITRATE 50 UG/ML
25 INJECTION, SOLUTION INTRAMUSCULAR; INTRAVENOUS EVERY 5 MIN PRN
Status: DISCONTINUED | OUTPATIENT
Start: 2022-04-05 | End: 2022-04-05 | Stop reason: HOSPADM

## 2022-04-05 RX ORDER — PANTOPRAZOLE SODIUM 40 MG/1
40 TABLET, DELAYED RELEASE ORAL DAILY
Qty: 30 TABLET | Refills: 0 | Status: SHIPPED | OUTPATIENT
Start: 2022-04-05 | End: 2023-03-22

## 2022-04-05 RX ORDER — HEPARIN SODIUM 1000 [USP'U]/ML
INJECTION, SOLUTION INTRAVENOUS; SUBCUTANEOUS
Status: DISCONTINUED | OUTPATIENT
Start: 2022-04-05 | End: 2022-04-05

## 2022-04-05 RX ORDER — ACETAMINOPHEN 325 MG/1
650 TABLET ORAL EVERY 4 HOURS PRN
Status: DISCONTINUED | OUTPATIENT
Start: 2022-04-05 | End: 2022-04-05 | Stop reason: HOSPADM

## 2022-04-05 RX ORDER — METOPROLOL SUCCINATE 50 MG/1
50 TABLET, EXTENDED RELEASE ORAL DAILY
Qty: 90 TABLET | Refills: 6 | Status: SHIPPED | OUTPATIENT
Start: 2022-04-05 | End: 2022-06-01 | Stop reason: SDUPTHER

## 2022-04-05 RX ORDER — MIDAZOLAM HYDROCHLORIDE 1 MG/ML
INJECTION INTRAMUSCULAR; INTRAVENOUS
Status: DISCONTINUED | OUTPATIENT
Start: 2022-04-05 | End: 2022-04-05

## 2022-04-05 RX ORDER — ONDANSETRON 2 MG/ML
4 INJECTION INTRAMUSCULAR; INTRAVENOUS DAILY PRN
Status: DISCONTINUED | OUTPATIENT
Start: 2022-04-05 | End: 2022-04-05 | Stop reason: HOSPADM

## 2022-04-05 RX ORDER — SILVER SULFADIAZINE 10 G/1000G
CREAM TOPICAL
Status: DISCONTINUED | OUTPATIENT
Start: 2022-04-05 | End: 2022-04-05 | Stop reason: HOSPADM

## 2022-04-05 RX ORDER — FUROSEMIDE 10 MG/ML
INJECTION INTRAMUSCULAR; INTRAVENOUS
Status: DISCONTINUED | OUTPATIENT
Start: 2022-04-05 | End: 2022-04-05

## 2022-04-05 RX ADMIN — ROCURONIUM BROMIDE 5 MG: 10 INJECTION, SOLUTION INTRAVENOUS at 12:04

## 2022-04-05 RX ADMIN — ACETAMINOPHEN 650 MG: 325 TABLET ORAL at 05:04

## 2022-04-05 RX ADMIN — VASOPRESSIN 1 UNITS: 20 INJECTION INTRAVENOUS at 12:04

## 2022-04-05 RX ADMIN — FUROSEMIDE 20 MG: 10 INJECTION, SOLUTION INTRAMUSCULAR; INTRAVENOUS at 03:04

## 2022-04-05 RX ADMIN — HEPARIN SODIUM 19000 UNITS: 1000 INJECTION INTRAVENOUS; SUBCUTANEOUS at 01:04

## 2022-04-05 RX ADMIN — SODIUM CHLORIDE 0.25 MCG/KG/MIN: 9 INJECTION, SOLUTION INTRAVENOUS at 12:04

## 2022-04-05 RX ADMIN — SUCCINYLCHOLINE CHLORIDE 200 MG: 20 INJECTION, SOLUTION INTRAMUSCULAR; INTRAVENOUS at 12:04

## 2022-04-05 RX ADMIN — LIDOCAINE HYDROCHLORIDE 100 MG: 20 INJECTION, SOLUTION INTRAVENOUS at 12:04

## 2022-04-05 RX ADMIN — VASOPRESSIN 2 UNITS: 20 INJECTION INTRAVENOUS at 12:04

## 2022-04-05 RX ADMIN — PROTAMINE SULFATE 95 MG: 10 INJECTION, SOLUTION INTRAVENOUS at 03:04

## 2022-04-05 RX ADMIN — HEPARIN SODIUM AND DEXTROSE 12 UNITS/KG/HR: 10000; 5 INJECTION INTRAVENOUS at 01:04

## 2022-04-05 RX ADMIN — DEXAMETHASONE SODIUM PHOSPHATE 8 MG: 4 INJECTION, SOLUTION INTRAMUSCULAR; INTRAVENOUS at 12:04

## 2022-04-05 RX ADMIN — ONDANSETRON HYDROCHLORIDE 4 MG: 2 INJECTION INTRAMUSCULAR; INTRAVENOUS at 03:04

## 2022-04-05 RX ADMIN — PROPOFOL 190 MG: 10 INJECTION, EMULSION INTRAVENOUS at 12:04

## 2022-04-05 RX ADMIN — NOREPINEPHRINE BITARTRATE 0.02 MCG/KG/MIN: 1 INJECTION, SOLUTION, CONCENTRATE INTRAVENOUS at 01:04

## 2022-04-05 RX ADMIN — VASOPRESSIN 1.5 UNITS: 20 INJECTION INTRAVENOUS at 01:04

## 2022-04-05 RX ADMIN — SODIUM CHLORIDE: 0.9 INJECTION, SOLUTION INTRAVENOUS at 12:04

## 2022-04-05 RX ADMIN — MIDAZOLAM HYDROCHLORIDE 2 MG: 1 INJECTION, SOLUTION INTRAMUSCULAR; INTRAVENOUS at 12:04

## 2022-04-05 RX ADMIN — FENTANYL CITRATE 100 MCG: 50 INJECTION, SOLUTION INTRAMUSCULAR; INTRAVENOUS at 12:04

## 2022-04-05 RX ADMIN — PHENYLEPHRINE HYDROCHLORIDE 100 MCG: 10 INJECTION, SOLUTION INTRAMUSCULAR; INTRAVENOUS; SUBCUTANEOUS at 12:04

## 2022-04-05 RX ADMIN — VASOPRESSIN 2 UNITS: 20 INJECTION INTRAVENOUS at 01:04

## 2022-04-05 RX ADMIN — HEPARIN SODIUM 3000 UNITS: 1000 INJECTION INTRAVENOUS; SUBCUTANEOUS at 02:04

## 2022-04-05 RX ADMIN — SODIUM CHLORIDE, SODIUM GLUCONATE, SODIUM ACETATE, POTASSIUM CHLORIDE, MAGNESIUM CHLORIDE, SODIUM PHOSPHATE, DIBASIC, AND POTASSIUM PHOSPHATE: .53; .5; .37; .037; .03; .012; .00082 INJECTION, SOLUTION INTRAVENOUS at 12:04

## 2022-04-05 NOTE — ANESTHESIA PROCEDURE NOTES
Intubation    Date/Time: 4/5/2022 12:26 PM  Performed by: Mona Luke CRNA  Authorized by: Amy Nelson MD     Intubation:     Induction:  Intravenous    Intubated:  Postinduction    Mask Ventilation:  Easy mask    Attempts:  1    Attempted By:  CRNA    Method of Intubation:  Video laryngoscopy    Blade:  Martin 3    Laryngeal View Grade: Grade I - full view of cords      Difficult Airway Encountered?: No      Complications:  None    Airway Device:  Oral endotracheal tube    Airway Device Size:  7.5    Style/Cuff Inflation:  Cuffed (inflated to minimal occlusive pressure)    Tube secured:  21    Secured at:  The lips    Placement Verified By:  Capnometry    Complicating Factors:  None    Findings Post-Intubation:  BS equal bilateral and atraumatic/condition of teeth unchanged

## 2022-04-05 NOTE — CONSULTS
[facility] Short Stay Cardiac Unit  Cardiology  Consult Note    Patient Name: Rich Ballard  MRN: [id number]  Admission Date: 4/5/2022  Hospital Length of Stay: 0 days  Code Status: No Order   Attending Provider: Niko Casarez MD   Consulting Provider: Julio Corral MD  Primary Care Physician: Piotr Gautam MD  Principal Problem:<principal problem not specified>    Patient information was obtained from patient and ER records.     Consults  Subjective:     Chief Complaint:  Elective PVI     HPI:   Mr. Rich Ballard is a 60 yo M who presents for elective PVI for management of persistent AF. GEOVANNI requested prior to ablation.    Dysphagia or odynophagia:  No  Liver Disease, esophageal disease, or known varices:  No  Upper GI Bleeding: No  Snoring:  No  Sleep Apnea:  No  Prior neck surgery or radiation:  No  History of anesthetic difficulties:  No  Family history of anesthetic difficulties:  No  Last oral intake:  12 hours ago  Able to move neck in all directions:  Yes      I had the pleasure of seeing Rich Ballard in consultation at your request for the evaluation of atrial fibrillation. He is a 61M [employer] with HTN, HLD, former tobacco use (quit in 2/2022, 30 pack-years), who was incidentally found to be in AF on a [employer] Physical in 1/2022. This led to a cardiac work-up including an echo (1/2022, EF of 65%) and a 24 hour Holter (1/2022, persistent AF with an average HR of 114 bpm).      In 2/2022 a GEOVANNI/DCCV was performed, restoring sinus rhythm. He was discharged on eliquis but not an antiarrhythmic. He thinks he maintained sinus rhythm for several days. On 1 week follow-up ECG he was back in AF. He presents to me today to discuss management options.     Pulse 74 bpm in the office today.      Past Medical History:   Diagnosis Date    Allergy     Atrial fibrillation     Hyperlipidemia     Hypertension        Past Surgical History:   Procedure Laterality Date    APPENDECTOMY      CERVICAL  FUSION      COLONOSCOPY N/A 2017    Procedure: COLONOSCOPY;  Surgeon: Kennedy Soto MD;  Location: [facility] ENDO;  Service: Endoscopy;  Laterality: N/A;    ppp      ROTATOR CUFF REPAIR      TREATMENT OF CARDIAC ARRHYTHMIA N/A 2022    Procedure: CARDIOVERSION;  Surgeon: Kaleb Santana MD;  Location: [facility] CATH/EP LAB;  Service: Cardiology;  Laterality: N/A;    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         Review of patient's allergies indicates:   Allergen Reactions    Amiodarone analogues Shortness Of Breath       No current facility-administered medications on file prior to encounter.     Current Outpatient Medications on File Prior to Encounter   Medication Sig    fluticasone propionate (FLONASE) 50 mcg/actuation nasal spray 2 sprays (100 mcg total) by Each Nostril route once daily.     Family History       Problem Relation (Age of Onset)    COPD Mother, Father    Heart disease Father    Hypertension Mother, Father          Tobacco Use    Smoking status: Former Smoker     Packs/day: 0.50     Years: 30.00     Pack years: 15.00     Types: Cigarettes     Quit date: 3/1/2022     Years since quittin.0    Smokeless tobacco: Never Used   Substance and Sexual Activity    Alcohol use: No     Comment: very rarely    Drug use: No    Sexual activity: Yes     Partners: Female     Review of Systems   Constitutional: Negative for chills, decreased appetite and fever.   HENT:  Negative for congestion and sore throat.    Eyes:  Negative for blurred vision and discharge.   Cardiovascular:  Negative for chest pain, claudication, cyanosis, dyspnea on exertion and leg swelling.   Respiratory:  Negative for cough, hemoptysis and shortness of breath.    Endocrine: Negative for cold intolerance and heat intolerance.   Skin:  Negative for color change.   Musculoskeletal:  Negative for muscle weakness and myalgias.   Gastrointestinal:  Negative for bloating and abdominal pain.   Neurological:  Negative for dizziness, focal  weakness and weakness.   Psychiatric/Behavioral:  Negative for altered mental status and depression.    Objective:     Vital Signs (Most Recent):  Temp: 98.9 °F (37.2 °C) (04/05/22 0845)  Pulse: 94 (04/05/22 0845)  Resp: 18 (04/05/22 0845)  BP: 131/88 (04/05/22 0846)  SpO2: 95 % (04/05/22 0845) Vital Signs (24h Range):  Temp:  [98.9 °F (37.2 °C)] 98.9 °F (37.2 °C)  Pulse:  [94] 94  Resp:  [18] 18  SpO2:  [95 %] 95 %  BP: (128-131)/(88-93) 131/88     Weight: 94.3 kg (208 lb)  Body mass index is 33.57 kg/m².    SpO2: 95 %       No intake or output data in the 24 hours ending 04/05/22 1017    Lines/Drains/Airways       Peripheral Intravenous Line  Duration                  Peripheral IV - Single Lumen 04/05/22 0845 20 G Left Antecubital <1 day         Peripheral IV - Single Lumen 04/05/22 0845 20 G Right Antecubital <1 day                    Physical Exam  Constitutional:       Appearance: He is well-developed.   HENT:      Head: Normocephalic and atraumatic.      Right Ear: External ear normal.      Left Ear: External ear normal.   Eyes:      Conjunctiva/sclera: Conjunctivae normal.   Cardiovascular:      Rate and Rhythm: Normal rate. Rhythm irregular.      Pulses: Intact distal pulses.           Radial pulses are 2+ on the right side and 2+ on the left side.      Heart sounds: Normal heart sounds.   Pulmonary:      Effort: Pulmonary effort is normal. No respiratory distress.      Breath sounds: Normal breath sounds. No wheezing.   Abdominal:      General: Bowel sounds are normal. There is no distension.      Palpations: Abdomen is soft.      Tenderness: There is no abdominal tenderness.   Musculoskeletal:         General: Normal range of motion.      Cervical back: Normal range of motion and neck supple.   Skin:     General: Skin is warm and dry.   Neurological:      Mental Status: He is alert and oriented to person, place, and time.   Psychiatric:         Mood and Affect: Mood normal.         Behavior: Behavior  normal.       Significant Labs: CMP No results for input(s): NA, K, CL, CO2, GLU, BUN, CREATININE, CALCIUM, PROT, ALBUMIN, BILITOT, ALKPHOS, AST, ALT, ANIONGAP, ESTGFRAFRICA, EGFRNONAA in the last 48 hours. and CBC No results for input(s): WBC, HGB, HCT, PLT in the last 48 hours.    Significant Imaging: EKG: atrial fibrillation    Assessment and Plan:     Atrial fibrillation, new onset  PVI for management of AF. GEOVANNI requested prior to procedure since patient in fibrillation.    1. GEOVANNI for evaluation of LA thrombus  -No absolute contraindications of esophageal stricture, tumor, perforation, laceration,or diverticulum and/or active GI bleed  -The risks, benefits & alternatives of the procedure were explained to the patient.   -The risks of transesophageal echo include but are not limited to:  Dental trauma, esophageal trauma/perforation, bleeding, laryngospasm/brochospasm, aspiration, sore throat/hoarseness, & dislodgement of the endotracheal tube/nasogastric tube (where applicable).    -The risks of moderate sedation include hypotension, respiratory depression, arrhythmias, bronchospasm, & death.    -Informed consent was obtained. The patient is agreeable to proceed with the procedure and all questions and concerns addressed.    Case discussed with an attending in echocardiography lab.     Further recommendations per attending addendum          VTE Risk Mitigation (From admission, onward)    None          Thank you for your consult. I will follow-up with patient. Please contact us if you have any additional questions.    Julio Corral MD  Cardiology   [facility] Stay [facility]

## 2022-04-05 NOTE — TRANSFER OF CARE
"Anesthesia Transfer of Care Note    Patient: Rich Ballard    Procedure(s) Performed: Procedure(s) (LRB):  ABLATION, ARRHYTHMOGENIC FOCUS, FOR ATRIAL FIBRILLATION (N/A)  ECHOCARDIOGRAM, TRANSESOPHAGEAL (N/A)  Cardioversion or Defibrillation    Patient location: Cath Lab    Anesthesia Type: general    Transport from OR: Transported from OR on 6-10 L/min O2 by face mask with adequate spontaneous ventilation    Post pain: adequate analgesia    Post assessment: no apparent anesthetic complications    Post vital signs: stable    Level of consciousness: awake, alert and oriented    Nausea/Vomiting: no nausea/vomiting    Complications: none    Transfer of care protocol was followed      Last vitals:   Visit Vitals  /88 (BP Location: Right arm, Patient Position: Lying)   Pulse 94   Temp 37.2 °C (98.9 °F) (Temporal)   Resp 18   Ht 5' 6" (1.676 m)   Wt 94.3 kg (208 lb)   SpO2 95%   BMI 33.57 kg/m²     "

## 2022-04-05 NOTE — SUBJECTIVE & OBJECTIVE
Past Medical History:   Diagnosis Date    Allergy     Atrial fibrillation     Hyperlipidemia     Hypertension        Past Surgical History:   Procedure Laterality Date    APPENDECTOMY      CERVICAL FUSION      COLONOSCOPY N/A 2017    Procedure: COLONOSCOPY;  Surgeon: Kennedy Soto MD;  Location: [facility] ENDO;  Service: Endoscopy;  Laterality: N/A;    ppp      ROTATOR CUFF REPAIR      TREATMENT OF CARDIAC ARRHYTHMIA N/A 2022    Procedure: CARDIOVERSION;  Surgeon: Kaleb Santana MD;  Location: [facility] CATH/EP LAB;  Service: Cardiology;  Laterality: N/A;    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         Review of patient's allergies indicates:   Allergen Reactions    Amiodarone analogues Shortness Of Breath       No current facility-administered medications on file prior to encounter.     Current Outpatient Medications on File Prior to Encounter   Medication Sig    fluticasone propionate (FLONASE) 50 mcg/actuation nasal spray 2 sprays (100 mcg total) by Each Nostril route once daily.     Family History       Problem Relation (Age of Onset)    COPD Mother, Father    Heart disease Father    Hypertension Mother, Father          Tobacco Use    Smoking status: Former Smoker     Packs/day: 0.50     Years: 30.00     Pack years: 15.00     Types: Cigarettes     Quit date: 3/1/2022     Years since quittin.0    Smokeless tobacco: Never Used   Substance and Sexual Activity    Alcohol use: No     Comment: very rarely    Drug use: No    Sexual activity: Yes     Partners: Female     Review of Systems   Constitutional: Negative for chills, decreased appetite and fever.   HENT:  Negative for congestion and sore throat.    Eyes:  Negative for blurred vision and discharge.   Cardiovascular:  Negative for chest pain, claudication, cyanosis, dyspnea on exertion and leg swelling.   Respiratory:  Negative for cough, hemoptysis and shortness of breath.    Endocrine: Negative for cold intolerance and heat intolerance.   Skin:  Negative for  color change.   Musculoskeletal:  Negative for muscle weakness and myalgias.   Gastrointestinal:  Negative for bloating and abdominal pain.   Neurological:  Negative for dizziness, focal weakness and weakness.   Psychiatric/Behavioral:  Negative for altered mental status and depression.    Objective:     Vital Signs (Most Recent):  Temp: 98.9 °F (37.2 °C) (04/05/22 0845)  Pulse: 94 (04/05/22 0845)  Resp: 18 (04/05/22 0845)  BP: 131/88 (04/05/22 0846)  SpO2: 95 % (04/05/22 0845) Vital Signs (24h Range):  Temp:  [98.9 °F (37.2 °C)] 98.9 °F (37.2 °C)  Pulse:  [94] 94  Resp:  [18] 18  SpO2:  [95 %] 95 %  BP: (128-131)/(88-93) 131/88     Weight: 94.3 kg (208 lb)  Body mass index is 33.57 kg/m².    SpO2: 95 %       No intake or output data in the 24 hours ending 04/05/22 1017    Lines/Drains/Airways       Peripheral Intravenous Line  Duration                  Peripheral IV - Single Lumen 04/05/22 0845 20 G Left Antecubital <1 day         Peripheral IV - Single Lumen 04/05/22 0845 20 G Right Antecubital <1 day                    Physical Exam  Constitutional:       Appearance: He is well-developed.   HENT:      Head: Normocephalic and atraumatic.      Right Ear: External ear normal.      Left Ear: External ear normal.   Eyes:      Conjunctiva/sclera: Conjunctivae normal.   Cardiovascular:      Rate and Rhythm: Normal rate. Rhythm irregular.      Pulses: Intact distal pulses.           Radial pulses are 2+ on the right side and 2+ on the left side.      Heart sounds: Normal heart sounds.   Pulmonary:      Effort: Pulmonary effort is normal. No respiratory distress.      Breath sounds: Normal breath sounds. No wheezing.   Abdominal:      General: Bowel sounds are normal. There is no distension.      Palpations: Abdomen is soft.      Tenderness: There is no abdominal tenderness.   Musculoskeletal:         General: Normal range of motion.      Cervical back: Normal range of motion and neck supple.   Skin:     General: Skin is  warm and dry.   Neurological:      Mental Status: He is alert and oriented to person, place, and time.   Psychiatric:         Mood and Affect: Mood normal.         Behavior: Behavior normal.       Significant Labs: CMP No results for input(s): NA, K, CL, CO2, GLU, BUN, CREATININE, CALCIUM, PROT, ALBUMIN, BILITOT, ALKPHOS, AST, ALT, ANIONGAP, ESTGFRAFRICA, EGFRNONAA in the last 48 hours. and CBC No results for input(s): WBC, HGB, HCT, PLT in the last 48 hours.    Significant Imaging: EKG: atrial fibrillation

## 2022-04-05 NOTE — DISCHARGE SUMMARY
[facility] - Cardiology  Cardiac Electrophysiology  Discharge Summary      Patient Name: Rich Ballard  MRN: [id number]  Admission Date: 4/5/2022  Hospital Length of Stay: 0 days  Discharge Date and Time:  04/05/2022 5:58 PM  Attending Physician: Niko Casarez MD    Discharging Provider: Philippe Ding MD  Primary Care Physician: Piotr Gautam MD      No notes on file    Procedure(s) (LRB):  ABLATION, ARRHYTHMOGENIC FOCUS, FOR ATRIAL FIBRILLATION (N/A)  ECHOCARDIOGRAM, TRANSESOPHAGEAL (N/A)  Cardioversion or Defibrillation     Indwelling Lines/Drains at time of discharge:  Lines/Drains/Airways     Drain  Duration                Urethral Catheter 04/05/22 1247 Double-lumen 16 Fr. <1 day                Hospital Course:    Briefly, Mr. Ballard is a 61  year old male with HTN, HLD, former tobacco use  who was incidentally found to be in AF  in 1/2022. Echo (1/2022, EF of 65%) and a 24 hour Holter (1/2022, persistent AF with an average HR of 114 bpm). In 2/2022 a GEOVANNI/DCCV was performed, restoring sinus rhythm. He was discharged on eliquis but not an antiarrhythmic. He thinks he maintained sinus rhythm for several days. On 1 week follow-up ECG he was back in AF. He followed up in clinic and opted for catheter ablation. Today, he reports feeling in his usual state of health. Procedure for PVI was discussed with him and he expressed understanding the potential risks and consented to the planned procedure. Last dose of anticoagulation was last night.           After consenting to the procedure, Mr. Ballard was brought to the EP lab. He had successful ablation -  pulmonary vein isolation to treat atrial fibrillation. He tolerated the procedure well and  was discharged home following bedrest in good condition.             Goals of Care Treatment Preferences:       Significant Diagnostic Studies:   Lab Results   Component Value Date    WBC 6.42 04/01/2022    HGB 15.6 04/01/2022    HCT 47.4 04/01/2022    MCV 94 04/01/2022      04/01/2022       CMP  Sodium   Date Value Ref Range Status   04/01/2022 140 136 - 145 mmol/L Final     Potassium   Date Value Ref Range Status   04/01/2022 4.5 3.5 - 5.1 mmol/L Final     Chloride   Date Value Ref Range Status   04/01/2022 105 95 - 110 mmol/L Final     CO2   Date Value Ref Range Status   04/01/2022 26 23 - 29 mmol/L Final     Glucose   Date Value Ref Range Status   04/01/2022 104 70 - 110 mg/dL Final     BUN   Date Value Ref Range Status   04/01/2022 15 8 - 23 mg/dL Final     Creatinine   Date Value Ref Range Status   04/01/2022 0.8 0.5 - 1.4 mg/dL Final     Calcium   Date Value Ref Range Status   04/01/2022 9.2 8.7 - 10.5 mg/dL Final     Total Protein   Date Value Ref Range Status   01/27/2022 7.2 6.0 - 8.4 g/dL Final     Albumin   Date Value Ref Range Status   01/27/2022 4.1 3.5 - 5.2 g/dL Final     Total Bilirubin   Date Value Ref Range Status   01/27/2022 0.8 0.1 - 1.0 mg/dL Final     Comment:     For infants and newborns, interpretation of results should be based  on gestational age, weight and in agreement with clinical  observations.    Premature Infant recommended reference ranges:  Up to 24 hours.............<8.0 mg/dL  Up to 48 hours............<12.0 mg/dL  3-5 days..................<15.0 mg/dL  6-29 days.................<15.0 mg/dL       Alkaline Phosphatase   Date Value Ref Range Status   01/27/2022 72 55 - 135 U/L Final     AST   Date Value Ref Range Status   01/27/2022 22 10 - 40 U/L Final     ALT   Date Value Ref Range Status   01/27/2022 27 10 - 44 U/L Final     Anion Gap   Date Value Ref Range Status   04/01/2022 9 8 - 16 mmol/L Final     eGFR if    Date Value Ref Range Status   04/01/2022 >60 >60 mL/min/1.73 m^2 Final     eGFR if non    Date Value Ref Range Status   04/01/2022 >60 >60 mL/min/1.73 m^2 Final     Comment:     Calculation used to obtain the estimated glomerular filtration  rate (eGFR) is the CKD-EPI equation.            Pending  Diagnostic Studies:     Procedure Component Value Units Date/Time    EKG 12-lead [[id number]]     Order Status: Sent Lab Status: No result           Final Active Diagnoses:    Diagnosis Date Noted POA    Atrial fibrillation, new onset [I48.91] 01/14/2022 Yes      Problems Resolved During this Admission:     No new Assessment & Plan notes have been filed under this hospital service since the last note was generated.  Service: Arrhythmia      Discharged Condition: good    Disposition: Home     Follow Up:   Follow-up Information     Niko Casarez MD Follow up in 3 month(s).    Specialties: Electrophysiology, Cardiovascular Disease, Cardiology  Contact information:  [address]  [facility] [address]  [phone]             [facility] - Electrophysiology 3rd Fl Follow up in 3 day(s).    Specialty: Electrophysiology  Why: Get and ECG 4/8/2022  Contact information:  [address]  [facility] [id number] [phone]  Additional information:  [facility] - Main Building, [address] 3rd Floor   Please park in [facility] and use Atrium elevator                     Patient Instructions:          EKG 12-lead   Standing Status: Future Standing Exp. Date: 04/05/23       Please remember:     Start sotalol 80 mg twice a day   Start protonix 40 mg daily and continue for 30 days   Resume your usual blood thinner as prescribed  Take a dose of lasix 20 mg if needed for swelling  You have to follow up for an ECG on Friday 4/8/2022     Do not strain or lift anything greater than 5 lb for 1 week.  Do not drive or operate any dangerous machinery for 24 hours.   Clean the area with mild soap and water and then cover it with a bandage.   Once the skin has healed, bathing in a tub or swimming is allowed.   Inspect the groin site daily and report to the physician any swelling at the site that   cannot be controlled with manual pressure for 10 minutes, unusual pain at the   access site or affected  extremity, unusual swelling at the access site, or signs or   symptoms of infection such as redness, pain, or fever.   Call 911 if you have:   Bleeding from the puncture site that you cannot stop by doing the following:   Relax and lie down right away. Keep your leg flat and apply firm pressure to the site using your fingers and a gauze pad. Keep the pressure on for 20 minutes. Continue this until the bleeding stops. This may take awhile. When bleeding stops, cover the site with a sterile bandage and keep your leg still as much as possible.     You may develop sharp chest discomfort in the next 1-3 days. This is called pericarditis and is common after an ablation procedure. The treatment is over the counter ibuprofen 600 mg by mouth three times daily as needed for 2-3 days. Ibuprofen should be taken with food. Please call the clinic if you experience this.       Medications:  Reconciled Home Medications:      Medication List      START taking these medications    furosemide 20 MG tablet  Commonly known as: LASIX  Take 1 tablet (20 mg total) by mouth daily as needed (for swelling).     pantoprazole 40 MG tablet  Commonly known as: PROTONIX  Take 1 tablet (40 mg total) by mouth once daily.     sotaloL 80 MG tablet  Commonly known as: BETAPACE  Take 1 tablet (80 mg total) by mouth 2 (two) times daily.        CHANGE how you take these medications    metoprolol succinate 50 MG 24 hr tablet  Commonly known as: TOPROL-XL  Take 1 tablet (50 mg total) by mouth once daily.  What changed: See the new instructions.        CONTINUE taking these medications    amLODIPine 10 MG tablet  Commonly known as: NORVASC  Take 1 tablet (10 mg total) by mouth every evening.     apixaban 5 mg Tab  Commonly known as: ELIQUIS  Take 1 tablet (5 mg total) by mouth once daily.     benazepriL 40 MG tablet  Commonly known as: LOTENSIN  Take one tab by mouth once daily in the evening.     fluticasone propionate 50 mcg/actuation nasal spray  Commonly  known as: FLONASE  2 sprays (100 mcg total) by Each Nostril route once daily.            Time spent on the discharge of patient:20  minutes    Philippe Ding MD  Cardiac Electrophysiology  [facility] - Cardiology

## 2022-04-05 NOTE — PROGRESS NOTES
Patient admitted to recovery see [facility] for complete assessment pacu bcg's maintained safety measures verified patient instructed on pain scale and patient verbalized understanding. Called for ekg and it was done and placed in chart. Also called patient's wife and updated on patient location with the permission of patient.

## 2022-04-05 NOTE — INTERVAL H&P NOTE
The patient has been examined and the H&P has been reviewed and changes made below:    Briefly, Mr. Ballard is a 61  year old male with HTN, HLD, former tobacco use  who was incidentally found to be in AF  in 1/2022. Echo (1/2022, EF of 65%) and a 24 hour Holter (1/2022, persistent AF with an average HR of 114 bpm). In 2/2022 a GEOVANNI/DCCV was performed, restoring sinus rhythm. He was discharged on eliquis but not an antiarrhythmic. He thinks he maintained sinus rhythm for several days. On 1 week follow-up ECG he was back in AF. He followed up in clinic and opted for catheter ablation. Today, he reports feeling in his usual state of health. Procedure for PVI was discussed with him and he expressed understanding the potential risks and consented to the planned procedure. Last dose of anticoagulation was last night.       ECG today shows afib with variable ventricular rate.   Labs and vitals reviewed.     Plan:  GEOVANNI to rule out thrombus  Proceed to PVI after GEOVANNI        Active Hospital Problems    Diagnosis  POA    Atrial fibrillation, new onset [I48.91]  Yes      Resolved Hospital Problems   No resolved problems to display.

## 2022-04-05 NOTE — ASSESSMENT & PLAN NOTE
PVI for management of AF. GEOVANNI requested prior to procedure since patient in fibrillation.    1. GEOVANNI for evaluation of LA thrombus  -No absolute contraindications of esophageal stricture, tumor, perforation, laceration,or diverticulum and/or active GI bleed  -The risks, benefits & alternatives of the procedure were explained to the patient.   -The risks of transesophageal echo include but are not limited to:  Dental trauma, esophageal trauma/perforation, bleeding, laryngospasm/brochospasm, aspiration, sore throat/hoarseness, & dislodgement of the endotracheal tube/nasogastric tube (where applicable).    -The risks of moderate sedation include hypotension, respiratory depression, arrhythmias, bronchospasm, & death.    -Informed consent was obtained. The patient is agreeable to proceed with the procedure and all questions and concerns addressed.    Case discussed with an attending in echocardiography lab.     Further recommendations per attending addendum

## 2022-04-05 NOTE — NURSING TRANSFER
Nursing Transfer Note      4/5/2022     Reason patient is being transferred: to pacu 6    Transfer To: pacu 6     Transfer via stretcher    Transfer with portable transport monitor, ekg, bp and hr and sats    Transported by estephanie rn     Medicines sent: silvadene cream    Any special needs or follow-up needed: groin checks and remove sutures and cervantes     Chart send with patient: Yes    Notified: spouse    Patient reassessed at: see epic reported to pacu rn and also notified patient's wife and his brother they were able to see patient when transporting to pacu  (date, time)    Upon arrival to floor: no complaints no distress noted.

## 2022-04-05 NOTE — BRIEF OP NOTE
: Tom Casarez M.D    Post-operative Diagnosis: AF    Procedure Performed: Pulmonary vein isolation of all 4 pulmonary veins via radiofrequency ablation. LA posterior wall isolation.     Description of Procedure: The patient was brought to the EP lab in the fasting state. Prepped and draped in sterile fashion. Safety timeout was performed. Sedation administered by anesthesia staff. Ultrasound guided venous access of the bilateral femoral veins was performed. ICE and CS catheters placed via left femoral vein access. Long sheaths via right femoral venous access. Heparin bolus and continuous infusion per protocol. Two transseptal punctures performed using combination of fluoroscopic and ICE guidance. Map created. RFA to isolate all pulmonary veins. LA posterior wall isolated. ICE confirmed no significant pericardial effusion.  All catheters and sheaths removed and two sutures to the groin. Patient was transferred to recovery in stable condition.     EBL: <10 mL    Specimens Removed: None  Complications: no immediate        Plan:  Post op ordering including bedrest x 6 hours   Protonix 40 mg daily  x 30 days   Nsaid prn for chest pain due to pericarditis   Sotalol 80 mg bid as outpatient   Outpatient follow up with Dr. Casarez

## 2022-04-05 NOTE — HOSPITAL COURSE
Briefly, Mr. Ballard is a 61  year old male with HTN, HLD, former tobacco use  who was incidentally found to be in AF  in 1/2022. Echo (1/2022, EF of 65%) and a 24 hour Holter (1/2022, persistent AF with an average HR of 114 bpm). In 2/2022 a GEOVANNI/DCCV was performed, restoring sinus rhythm. He was discharged on eliquis but not an antiarrhythmic. He thinks he maintained sinus rhythm for several days. On 1 week follow-up ECG he was back in AF. He followed up in clinic and opted for catheter ablation. Today, he reports feeling in his usual state of health. Procedure for PVI was discussed with him and he expressed understanding the potential risks and consented to the planned procedure. Last dose of anticoagulation was last night.           After consenting to the procedure, Mr. Ballard was brought to the EP lab. He had successful ablation -  pulmonary vein isolation to treat atrial fibrillation. He tolerated the procedure well and  was discharged home following bedrest in good condition.

## 2022-04-05 NOTE — HPI
Mr. Rich Ballard is a 62 yo M who presents for elective PVI for management of persistent AF. GEOVANNI requested prior to ablation.    Dysphagia or odynophagia:  No  Liver Disease, esophageal disease, or known varices:  No  Upper GI Bleeding: No  Snoring:  No  Sleep Apnea:  No  Prior neck surgery or radiation:  No  History of anesthetic difficulties:  No  Family history of anesthetic difficulties:  No  Last oral intake:  12 hours ago  Able to move neck in all directions:  Yes      I had the pleasure of seeing Rich Ballard in consultation at your request for the evaluation of atrial fibrillation. He is a 61M merchant marine with HTN, HLD, former tobacco use (quit in 2/2022, 30 pack-years), who was incidentally found to be in AF on a [employer] Physical in 1/2022. This led to a cardiac work-up including an echo (1/2022, EF of 65%) and a 24 hour Holter (1/2022, persistent AF with an average HR of 114 bpm).      In 2/2022 a GEOVANNI/DCCV was performed, restoring sinus rhythm. He was discharged on eliquis but not an antiarrhythmic. He thinks he maintained sinus rhythm for several days. On 1 week follow-up ECG he was back in AF. He presents to me today to discuss management options.     Pulse 74 bpm in the office today.

## 2022-04-05 NOTE — ANESTHESIA PREPROCEDURE EVALUATION
2022  Rich Ballard is a 61 y.o., male.  Pre-operative evaluation for Procedure(s) (LRB):  ABLATION, ARRHYTHMOGENIC FOCUS, FOR ATRIAL FIBRILLATION (N/A)  ECHOCARDIOGRAM, TRANSESOPHAGEAL (N/A)    Rich Ballard is a 61 y.o. male       Patient Active Problem List   Diagnosis    Corneal edema, unspecified    Unspecified disorder of conjunctiva    Hypertension    Hyperlipidemia    Screen for colon cancer    Severe obesity with body mass index (BMI) of 35.0 to 39.9 with comorbidity    Tachycardia    Irregularly irregular pulse rhythm    Atrial fibrillation, new onset       Past Surgical History:   Procedure Laterality Date    APPENDECTOMY      CERVICAL FUSION      COLONOSCOPY N/A 2017    Procedure: COLONOSCOPY;  Surgeon: Kennedy Soto MD;  Location: [facility] ENDO;  Service: Endoscopy;  Laterality: N/A;    ppp      ROTATOR CUFF REPAIR      TREATMENT OF CARDIAC ARRHYTHMIA N/A 2022    Procedure: CARDIOVERSION;  Surgeon: Kaleb Santana MD;  [facility] CATH/EP LAB;  Service: Cardiology;  Laterality: N/A;    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         Social History     Socioeconomic History    Marital status:     Number of children: 4   Tobacco Use    Smoking status: Former Smoker     Packs/day: 0.50     Years: 30.00     Pack years: 15.00     Types: Cigarettes     Quit date: 3/1/2022     Years since quittin.0    Smokeless tobacco: Never Used   Substance and Sexual Activity    Alcohol use: No     Comment: very rarely    Drug use: No    Sexual activity: Yes     Partners: Female       No current facility-administered medications on file prior to encounter.     Current Outpatient Medications on File Prior to Encounter   Medication Sig Dispense Refill    fluticasone propionate (FLONASE) 50 mcg/actuation nasal spray 2 sprays (100 mcg total) by Each Nostril route once daily. 16  g 5       Review of patient's allergies indicates:   Allergen Reactions    Amiodarone analogues Shortness Of Breath         CBC: No results for input(s): WBC, RBC, HGB, HCT, PLT, MCV, MCH, MCHC in the last 72 hours.    CMP: No results for input(s): NA, K, CL, CO2, BUN, CREATININE, GLU, MG, PHOS, CALCIUM, ALBUMIN, PROT, ALKPHOS, ALT, AST, BILITOT in the last 72 hours.    INR  No results for input(s): PT, INR, PROTIME, APTT in the last 72 hours.      Diagnostic Studies:    EKG:   Results for orders placed or performed in visit on 02/24/22   IN OFFICE EKG 12-LEAD (to Flora)    Collection Time: 02/24/22  5:29 PM    Narrative    Test Reason : I48.0,I48.91,R00.0,    Vent. Rate : 096 BPM     Atrial Rate : 073 BPM     P-R Int : 000 ms          QRS Dur : 092 ms      QT Int : 368 ms       P-R-T Axes : 000 044 -16 degrees     QTc Int : 464 ms    Atrial fibrillation  Abnormal QRS-T angle, consider primary T wave abnormality  Abnormal ECG  When compared with ECG of 17-FEB-2022 09:28,  Atrial fibrillation has replaced Sinus rhythm  Nonspecific T wave abnormality now evident in Inferior leads  Confirmed by Angeles TRINH, Piotr ABDI ([id number]) on 2/27/2022 3:13:03 PM    Referred By: [email or web address]   SELF           Confirmed By:Piotr Reynoso MD       TTE:  Results for orders placed or performed during the hospital encounter of 01/20/22   Echo   Result Value Ref Range    Left Atrium Major Axis 4.80 cm    LA size 4.10 cm    AORTIC VALVE CUSP SEPERATION 1.90 cm    AV mean gradient 4 mmHg    Ao VTI 24.00 cm    Ao peak ramin 1.34 m/s    AV peak gradient 7 mmHg    Ao root annulus 3.80 cm    IVRT 58.00 ms    IVS 1.54 (A) 0.6 - 1.1 cm    LVIDd 4.15 3.5 - 6.0 cm    LVIDs 2.78 2.1 - 4.0 cm    LVOT diameter 2.00 cm    LVOT peak VTI 19.90 cm    LVOT peak ramin 0.88 m/s    Posterior Wall 1.22 (A) 0.6 - 1.1 cm    E wave deceleration time 164.00 ms    MV Peak E Ramin 1.01 m/s    PV mean gradient 1.00 mmHg    PV PEAK VELOCITY 0.87 m/s    RVDD 2.86 cm    Triscuspid  Valve Regurgitation Peak Gradient 24 mmHg    BSA 2.1 m2    TDI SEPTAL 0.06 m/s    LV LATERAL E/E' RATIO 7.21 m/s    LV SEPTAL E/E' RATIO 16.83 m/s    TDI LATERAL 0.14 m/s    FS 33 28 - 44 %    LV mass 215.64 g    Left Ventricle Relative Wall Thickness 0.59 cm    AV valve area 2.60 cm2    AV Velocity Ratio 0.66     AV index (prosthetic) 0.83     Mean e' 0.10 m/s    LVOT area 3.1 cm2    LVOT stroke volume 62.49 cm3    E/E' ratio 10.10 m/s    TR Max Ramin 2.43 m/s    LV Mass Index 106 g/m2    Right Atrial Pressure (from IVC) 3 mmHg    EF 65 %    TV rest pulmonary artery pressure 27 mmHg    Narrative    · The left ventricle is normal in size with concentric remodeling and   normal systolic function.  · The estimated ejection fraction is 65%.  · A diastolic pattern consistent with atrial fibrillation observed.  · With normal left atrial pressure.  · Normal right ventricular size with normal right ventricular systolic   function.  · Mild mitral regurgitation.  · Normal central venous pressure (3 mmHg).  · The estimated PA systolic pressure is 27 mmHg.        EF   Date Value Ref Range Status   02/17/2022 60 % Final   01/20/2022 65 % Final      No results found for this or any previous visit.    GEOVANNI:  No results found for this or any previous visit.    Stress Test:  Results for orders placed in visit on 01/24/22    Nuclear Stress Test    Interpretation Summary    The EKG portion of this study is negative for ischemia.    The patient reported no chest pain during the stress test.    During stress, atrial fibrillation is noted.    The nuclear portion of this study will be reported separately.       [facility]:  Results for orders placed during the hospital encounter of 02/17/22    Intra-Procedure Documentation    Narrative  GEOVANNI performed in the Invasive Lab  - See Procedure Log link below for nursing documentation  - See GEOVANNI order on Card Proc Tab for physician findings       PFT:  No results found for: FEV1, FVC, CKT2VBX, TLC,  DLCO     ALLERGIES:     Review of patient's allergies indicates:   Allergen Reactions    Amiodarone analogues Shortness Of Breath     LDA:      Lines/Drains/Airways     Peripheral Intravenous Line  Duration                Peripheral IV - Single Lumen 04/05/22 0845 20 G Left Antecubital <1 day         Peripheral IV - Single Lumen 04/05/22 0845 20 G Right Antecubital <1 day               Anesthesia Evaluation      Airway   Mallampati: II  TM distance: > 6 cm  Neck ROM: Normal ROM  Dental    (+) Intact    Pulmonary    Cardiovascular   (+) hypertension, dysrhythmias,     Rate: Normal    Neuro/Psych      GI/Hepatic/Renal      Endo/Other    Abdominal                         Pre-op Assessment    I have reviewed the Patient Summary Reports.     I have reviewed the Nursing Notes. I have reviewed the NPO Status.   I have reviewed the Medications.     Review of Systems  Anesthesia Hx:  No problems with previous Anesthesia  Denies Family Hx of Anesthesia complications.   Denies Personal Hx of Anesthesia complications.   Cardiovascular:   Hypertension Dysrhythmias atrial fibrillation hyperlipidemia    Renal/:  Renal/ Normal     Hepatic/GI:  Hepatic/GI Normal    Neurological:  Neurology Normal    Endocrine:  Endocrine Normal        Physical Exam  General: Well nourished    Airway:  Mallampati: II   Mouth Opening: Normal  TM Distance: > 6 cm  Tongue: Normal  Neck ROM: Normal ROM    Dental:  Intact    Chest/Lungs:  Normal Respiratory Rate    Heart:  Rate: Normal        Anesthesia Plan  Type of Anesthesia, risks & benefits discussed:    Anesthesia Type: Gen ETT  Intra-op Monitoring Plan: Standard ASA Monitors and Art Line  Post Op Pain Control Plan: multimodal analgesia and IV/PO Opioids PRN  Induction:  IV and rapid sequence  Airway Plan: Video, Post-Induction  Informed Consent: Informed consent signed with the Patient and all parties understand the risks and agree with anesthesia plan.  All questions answered. Patient  consented to blood products? Yes  ASA Score: 3  Day of Surgery Review of History & Physical: H&P Update referred to the surgeon/provider.    Ready For Surgery From Anesthesia Perspective.     .

## 2022-04-05 NOTE — Clinical Note
The intracardiac echocardiography catheter was inserted into right femoral vein for appendage study by Dr. Casarez.

## 2022-04-05 NOTE — DISCHARGE INSTRUCTIONS
Start sotalol 80 mg twice a day   Start protonix 40 mg daily and continue for 30 days   Resume your usual blood thinner as prescribed  Take a dose of lasix 20 mg if needed for swelling  You have to follow up for an ECG on Friday 4/8/2022     Do not strain or lift anything greater than 5 lb for 1 week.  Do not drive or operate any dangerous machinery for 24 hours.   Clean the area with mild soap and water and then cover it with a bandage.   Once the skin has healed, bathing in a tub or swimming is allowed.   Inspect the groin site daily and report to the physician any swelling at the site that   cannot be controlled with manual pressure for 10 minutes, unusual pain at the   access site or affected extremity, unusual swelling at the access site, or signs or   symptoms of infection such as redness, pain, or fever.   Call 911 if you have:   Bleeding from the puncture site that you cannot stop by doing the following:   Relax and lie down right away. Keep your leg flat and apply firm pressure to the site using your fingers and a gauze pad. Keep the pressure on for 20 minutes. Continue this until the bleeding stops. This may take awhile. When bleeding stops, cover the site with a sterile bandage and keep your leg still as much as possible.     You may develop sharp chest discomfort in the next 1-3 days. This is called pericarditis and is common after an ablation procedure. The treatment is over the counter ibuprofen 600 mg by mouth three times daily as needed for 2-3 days. Ibuprofen should be taken with food. Please call the clinic if you experience this.

## 2022-04-06 ENCOUNTER — TELEPHONE (OUTPATIENT)
Dept: ELECTROPHYSIOLOGY | Facility: CLINIC | Age: 62
End: 2022-04-06
Payer: COMMERCIAL

## 2022-04-06 DIAGNOSIS — J30.2 SEASONAL ALLERGIC RHINITIS, UNSPECIFIED TRIGGER: ICD-10-CM

## 2022-04-06 DIAGNOSIS — I48.91 ATRIAL FIBRILLATION, NEW ONSET: ICD-10-CM

## 2022-04-06 DIAGNOSIS — I10 ESSENTIAL HYPERTENSION: ICD-10-CM

## 2022-04-06 RX ORDER — AMLODIPINE BESYLATE 10 MG/1
10 TABLET ORAL NIGHTLY
Qty: 90 TABLET | Refills: 1 | Status: CANCELLED | OUTPATIENT
Start: 2022-04-06

## 2022-04-06 RX ORDER — SOTALOL HYDROCHLORIDE 80 MG/1
80 TABLET ORAL 2 TIMES DAILY
Qty: 60 TABLET | Refills: 11 | Status: CANCELLED | OUTPATIENT
Start: 2022-04-06 | End: 2023-04-06

## 2022-04-06 RX ORDER — METOPROLOL SUCCINATE 50 MG/1
50 TABLET, EXTENDED RELEASE ORAL DAILY
Qty: 90 TABLET | Refills: 6 | Status: CANCELLED | OUTPATIENT
Start: 2022-04-06

## 2022-04-06 RX ORDER — BENAZEPRIL HYDROCHLORIDE 40 MG/1
TABLET ORAL
Qty: 90 TABLET | Refills: 1 | Status: CANCELLED | OUTPATIENT
Start: 2022-04-06

## 2022-04-06 RX ORDER — FLUTICASONE PROPIONATE 50 MCG
2 SPRAY, SUSPENSION (ML) NASAL DAILY
Qty: 16 G | Refills: 5 | Status: CANCELLED | OUTPATIENT
Start: 2022-04-06

## 2022-04-06 NOTE — PROGRESS NOTES
Patient wheeled out by RN at this time. All discharge instructions discussed. All questions and concerns addressed. All PIVs removed. Patient wheeled to wife's car at this time.

## 2022-04-06 NOTE — ANESTHESIA POSTPROCEDURE EVALUATION
Anesthesia Post Evaluation    Patient: Rich Ballard    Procedure(s) Performed: Procedure(s) (LRB):  ABLATION, ARRHYTHMOGENIC FOCUS, FOR ATRIAL FIBRILLATION (N/A)  ECHOCARDIOGRAM, TRANSESOPHAGEAL (N/A)  Cardioversion or Defibrillation    Final Anesthesia Type: general      Patient location during evaluation: PACU  Patient participation: Yes- Able to Participate  Level of consciousness: awake and alert  Post-procedure vital signs: reviewed and stable  Pain management: adequate  Airway patency: patent    PONV status at discharge: No PONV  Anesthetic complications: no      Cardiovascular status: hemodynamically stable  Respiratory status: unassisted  Hydration status: euvolemic  Follow-up not needed.          Vitals Value Taken Time   /88 04/05/22 2130   Temp 36.8 °C (98.2 °F) 04/05/22 1830   Pulse 88 04/05/22 2123   Resp 123 04/05/22 2123   SpO2 98 % 04/05/22 2123   Vitals shown include unvalidated device data.      Event Time   Out of Recovery 16:00:00         Pain/Marisol Score: Pain Rating Prior to Med Admin: 4 (4/5/2022  5:44 PM)  Marisol Score: 10 (4/5/2022  5:00 PM)

## 2022-04-06 NOTE — TELEPHONE ENCOUNTER
Please make sure patient has cardiology appointment it is very unusual to be on sotalol and metoprolol and I would like for him to have a visit to discuss thank you

## 2022-04-06 NOTE — TELEPHONE ENCOUNTER
Spoke to pt and pt stated he would like his EKG scheduled at [facility] instead of [place]. He is scheduled for 4/8.

## 2022-04-06 NOTE — PROGRESS NOTES
Patient ambulated around unit at this time. Patient tolerated ambulation well. No bleeding or drainage noted on either groin site. Patient able to void at this time. One occurrence. Patient meets discharge criteria at this time.

## 2022-04-06 NOTE — TELEPHONE ENCOUNTER
Informed pt message from provider. Pt verbalized understanding.Pt has scheduled appt with cardiologist.

## 2022-04-06 NOTE — TELEPHONE ENCOUNTER
No new care gaps identified.  Powered by [employer]. Reference number: [id number].   4/06/2022 12:15:37 PM CDT

## 2022-04-06 NOTE — PROGRESS NOTES
Bilateral groin sutures removed at this time. No bleeding present. Tegaderm and gauze applied to bilateral sites. Seals catheter also removed at this time. Patient's HOB 30 degrees per MD order. WCTM to monitor for signs of bleeding. VSS.

## 2022-04-06 NOTE — PROGRESS NOTES
Discharge instructions reviewed with patient. No further questions at this time. Groin sites assessed; no signs of bleeding. Patient to be on bedrest until 2120. WCTM. VSS.

## 2022-04-08 ENCOUNTER — HOSPITAL ENCOUNTER (OUTPATIENT)
Dept: CARDIOLOGY | Facility: CLINIC | Age: 62
Discharge: HOME | End: 2022-04-08
Payer: COMMERCIAL

## 2022-04-08 DIAGNOSIS — Z51.81 ENCOUNTER FOR MONITORING SOTALOL THERAPY: ICD-10-CM

## 2022-04-08 DIAGNOSIS — Z79.899 ENCOUNTER FOR MONITORING SOTALOL THERAPY: ICD-10-CM

## 2022-04-08 PROCEDURE — 93010 EKG 12-LEAD: ICD-10-PCS | Mod: S$GLB,,, | Performed by: INTERNAL MEDICINE

## 2022-04-08 PROCEDURE — 93005 EKG 12-LEAD: ICD-10-PCS | Mod: S$GLB,,, | Performed by: INTERNAL MEDICINE

## 2022-04-08 PROCEDURE — 93005 ELECTROCARDIOGRAM TRACING: CPT | Mod: S$GLB,,, | Performed by: INTERNAL MEDICINE

## 2022-04-08 PROCEDURE — 93010 ELECTROCARDIOGRAM REPORT: CPT | Mod: S$GLB,,, | Performed by: INTERNAL MEDICINE

## 2022-04-11 ENCOUNTER — TELEPHONE (OUTPATIENT)
Dept: ELECTROPHYSIOLOGY | Facility: CLINIC | Age: 62
End: 2022-04-11
Payer: COMMERCIAL

## 2022-04-11 LAB
POC ACTIVATED CLOTTING TIME K: 136 SEC (ref 74–137)
POC ACTIVATED CLOTTING TIME K: 309 SEC (ref 74–137)
POC ACTIVATED CLOTTING TIME K: 333 SEC (ref 74–137)
POC ACTIVATED CLOTTING TIME K: 351 SEC (ref 74–137)
SAMPLE: ABNORMAL
SAMPLE: NORMAL

## 2022-04-11 NOTE — TELEPHONE ENCOUNTER
Spoke to patient regarding post op care. Patient states denies any complaints. Wound site dry and intact without redness, swelling, hematoma or drainage. Patient denies any fever or pain. Denies urinary symptoms of infection post op.   Patient  has resumed medications and/or picked up new prescriptions ordered at discharge.Patient verbalizes understanding of all post op instructions.

## 2022-04-18 ENCOUNTER — TELEPHONE (OUTPATIENT)
Dept: CARDIOLOGY | Facility: CLINIC | Age: 62
End: 2022-04-18
Payer: COMMERCIAL

## 2022-04-18 ENCOUNTER — TELEPHONE (OUTPATIENT)
Dept: CARDIOLOGY | Facility: HOSPITAL | Age: 62
End: 2022-04-18
Payer: COMMERCIAL

## 2022-04-18 NOTE — TELEPHONE ENCOUNTER
----- Message from Kong Willoughby sent at 4/18/2022  2:28 PM CDT -----  Name Of Caller: Shaneka        Provider Name: Kaleb Santana        Does patient feel the need to be seen today? no        Relationship to the Pt?: wife        Contact Preference?: [phone]        What is the nature of the call?: Patient's wife would like to speak with someone in the office regarding the patient's prescription for apixaban (ELIQUIS) 5 mg Tab, would like to know if the patient needs to continue this medication

## 2022-04-18 NOTE — TELEPHONE ENCOUNTER
Attempted to contact patient for 2 week follow up regarding a fib cardiac ablation recovery. Unable to reach patient. LVM and provided call back number.

## 2022-04-18 NOTE — TELEPHONE ENCOUNTER
----- Message from Jadyn Falcon sent at 4/18/2022 10:49 AM CDT -----  Regarding: questions  Contact: [phone]  Pt Questions    Who Called:Pt wife   Questions: Calling with questions about a medication for the pt   Call Back number: [phone]

## 2022-04-19 ENCOUNTER — TELEPHONE (OUTPATIENT)
Dept: CARDIOLOGY | Facility: HOSPITAL | Age: 62
End: 2022-04-19
Payer: COMMERCIAL

## 2022-04-19 ENCOUNTER — TELEPHONE (OUTPATIENT)
Dept: ELECTROPHYSIOLOGY | Facility: CLINIC | Age: 62
End: 2022-04-19
Payer: COMMERCIAL

## 2022-04-19 DIAGNOSIS — I48.91 ATRIAL FIBRILLATION, NEW ONSET: ICD-10-CM

## 2022-04-19 NOTE — TELEPHONE ENCOUNTER
"Returned call and spokew ith pt. Advised that he is to continue on ELIQUIS until follow up with Dr Casarez. Verified how pt was taking Eliquis. He stated 5 mg once daily. Explained to pt that for the medication to be affective he needs to be on it twice daily. Pt voiced understanding.      ----- Message from Carmen Ramírez RN sent at 4/19/2022  9:57 AM CDT -----  Regarding: Eliquis Refill  Good morning, Ami!    Rich Ballard's wife, Cynthia, returned my 2 week post op PVI phone call this morning. She stated Rich is on his last pill of Eliquis and is asking if the plan is to continue taking the Eliquis. If so, she needs help with refills. I see from Dr. Casarez's note, "Rich Ballard is a 61M with symptomatic persistent AF. His [id number] Score is 1 so he should continue eliquis for now." I told Cynthia that her  should continue taking his Eliquis and that she would be receiving a confirmation call from our team after a refill has been sent. In addition to the refill, can you please confirm if he should be taking Eliquis once or twice daily? It is currently prescribed once daily.     Thank you,  Daphne Ramírez      "

## 2022-04-19 NOTE — TELEPHONE ENCOUNTER
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 Casarez. Wife denies patient experiencing any symptoms of infection, problems urinating, fluid overload, SOB, or chest pain. Wife mentions patient has 1 eliquis pill left, with no refills. Message sent to Ami, Dr. Casarez's nurse, to help refill prescription. No concerns at this time.

## 2022-04-19 NOTE — TELEPHONE ENCOUNTER
"----- Message from Carmen Ramírez RN sent at 4/19/2022  9:57 AM CDT -----  Regarding: Eliquis Refill  Good morning, Ami!    Rich Ballard's wife, Cynthia, returned my 2 week post op PVI phone call this morning. She stated Rich is on his last pill of Eliquis and is asking if the plan is to continue taking the Eliquis. If so, she needs help with refills. I see from Dr. Casarez's note, "Rich Ballard is a 61M with symptomatic persistent AF. His [id number] Score is 1 so he should continue eliquis for now." I told Cynthia that her  should continue taking his Eliquis and that she would be receiving a confirmation call from our team after a refill has been sent. In addition to the refill, can you please confirm if he should be taking Eliquis once or twice daily? It is currently prescribed once daily.     Thank you,  Daphne Ramírez    "

## 2022-05-06 ENCOUNTER — TELEPHONE (OUTPATIENT)
Dept: ELECTROPHYSIOLOGY | Facility: CLINIC | Age: 62
End: 2022-05-06
Payer: COMMERCIAL

## 2022-05-06 RX ORDER — SOTALOL HYDROCHLORIDE 80 MG/1
80 TABLET ORAL 2 TIMES DAILY
Qty: 60 TABLET | Refills: 0 | Status: SHIPPED | OUTPATIENT
Start: 2022-05-06 | End: 2022-06-27

## 2022-05-06 RX ORDER — SOTALOL HYDROCHLORIDE 80 MG/1
80 TABLET ORAL 2 TIMES DAILY
Qty: 180 TABLET | Refills: 3 | Status: SHIPPED | OUTPATIENT
Start: 2022-06-06 | End: 2023-06-19 | Stop reason: SDUPTHER

## 2022-05-06 NOTE — TELEPHONE ENCOUNTER
Returned call to pt's wife. She stated that the pt needs refill on sotalol and has not been able to get in touch with anyone. Prescription refills sent to Dr Casarez to sign.        ----- Message from Araceli Delgado MA sent at 5/6/2022  8:09 AM CDT -----  Contact: Wife Cynthia    ----- Message -----  From: Kristen Mckeon  Sent: 5/6/2022   8:04 AM CDT  To: Leida Pope Staff    Patients wife is calling requesting to have Dr. Casarez's nurse to return her call this morning at [phone]. Thank You.

## 2022-05-06 NOTE — TELEPHONE ENCOUNTER
----- Message from Araceli Delgado MA sent at 5/6/2022  8:09 AM CDT -----  Contact: Wife Cynthia    ----- Message -----  From: Kristen Mckeon  Sent: 5/6/2022   8:04 AM CDT  To: Leida Pope Staff    Patients wife is calling requesting to have Dr. Casarez's nurse to return her call this morning at [phone]. Thank You.

## 2022-05-16 ENCOUNTER — OFFICE VISIT (OUTPATIENT)
Dept: PULMONOLOGY | Facility: CLINIC | Age: 62
End: 2022-05-16
Payer: COMMERCIAL

## 2022-05-16 VITALS
HEART RATE: 75 BPM | OXYGEN SATURATION: 97 % | HEIGHT: 66 IN | DIASTOLIC BLOOD PRESSURE: 90 MMHG | BODY MASS INDEX: 34.07 KG/M2 | WEIGHT: 212 LBS | SYSTOLIC BLOOD PRESSURE: 140 MMHG

## 2022-05-16 DIAGNOSIS — G47.33 OSA (OBSTRUCTIVE SLEEP APNEA): Primary | ICD-10-CM

## 2022-05-16 PROCEDURE — 4010F ACE/ARB THERAPY RXD/TAKEN: CPT | Mod: CPTII,S$GLB,, | Performed by: INTERNAL MEDICINE

## 2022-05-16 PROCEDURE — 1159F PR MEDICATION LIST DOCUMENTED IN MEDICAL RECORD: ICD-10-PCS | Mod: CPTII,S$GLB,, | Performed by: INTERNAL MEDICINE

## 2022-05-16 PROCEDURE — 4010F PR ACE/ARB THEARPY RXD/TAKEN: ICD-10-PCS | Mod: CPTII,S$GLB,, | Performed by: INTERNAL MEDICINE

## 2022-05-16 PROCEDURE — 99203 OFFICE O/P NEW LOW 30 MIN: CPT | Mod: S$GLB,,, | Performed by: INTERNAL MEDICINE

## 2022-05-16 PROCEDURE — 99203 PR OFFICE/OUTPT VISIT, NEW, LEVL III, 30-44 MIN: ICD-10-PCS | Mod: S$GLB,,, | Performed by: INTERNAL MEDICINE

## 2022-05-16 PROCEDURE — 1159F MED LIST DOCD IN RCRD: CPT | Mod: CPTII,S$GLB,, | Performed by: INTERNAL MEDICINE

## 2022-05-16 RX ORDER — ALPRAZOLAM 0.25 MG/1
.125-.25 TABLET ORAL DAILY
COMMUNITY
Start: 2022-04-24 | End: 2023-03-22

## 2022-05-16 NOTE — PROGRESS NOTES
SUBJECTIVE:    Patient ID: Rich Ballard is a 61 y.o. male.    Chief Complaint: Establish Care and Apnea    HPI The patient is here after developing A fib.  He has a history of RONALDO but received a UPPP and then a repeat sleep study which said he didn't need CPAP any longer.  He has had a cardioversion and then an ablation.  His weight is about the same as when he was evaluated for his CPAP.    Past Medical History:   Diagnosis Date    Allergy     Atrial fibrillation     Hyperlipidemia     Hypertension      Past Surgical History:   Procedure Laterality Date    ABLATION OF ARRHYTHMOGENIC FOCUS FOR ATRIAL FIBRILLATION N/A 4/5/2022    Procedure: ABLATION, ARRHYTHMOGENIC FOCUS, FOR ATRIAL FIBRILLATION;  Surgeon: Niko Casarez MD;  Location: [facility] EP LAB;  Service: Cardiology;  Laterality: N/A;  AF, GEOVANNI(Cx if SR), PVI, RFA, CARTO, GEN, GP, 3 PREP    APPENDECTOMY      CERVICAL FUSION      COLONOSCOPY N/A 2/1/2017    Procedure: COLONOSCOPY;  Surgeon: Kennedy Soto MD;  Location: [facility] ENDO;  Service: Endoscopy;  Laterality: N/A;    ppp      ROTATOR CUFF REPAIR      TRANSESOPHAGEAL ECHOCARDIOGRAPHY N/A 4/5/2022    Procedure: ECHOCARDIOGRAM, TRANSESOPHAGEAL;  Surgeon: Tray Leal MD;  Location: [facility] EP LAB;  Service: Cardiology;  Laterality: N/A;    TREATMENT OF CARDIAC ARRHYTHMIA N/A 2/17/2022    Procedure: CARDIOVERSION;  Surgeon: Kaleb Santana MD;  Location: [facility] CATH/EP LAB;  Service: Cardiology;  Laterality: N/A;    TREATMENT OF CARDIAC ARRHYTHMIA  4/5/2022    Procedure: Cardioversion or Defibrillation;  Surgeon: Niko Casarez MD;  Location: [facility] EP LAB;  Service: Cardiology;;    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  Tobacco History:  reports that he quit smoking about 2 months ago. His smoking use included cigarettes. He started smoking about 32 years ago. He has a 15.00 pack-year smoking history. He has never used smokeless tobacco.  Drug History:  reports no history of drug use.    Review of patient's allergies indicates:   Allergen Reactions    Amiodarone analogues Shortness Of Breath       Current Outpatient Medications   Medication Sig Dispense Refill    amLODIPine (NORVASC) 10 MG tablet Take 1 tablet (10 mg total) by mouth every evening. 90 tablet 1    apixaban (ELIQUIS) 5 mg Tab Take 1 tablet (5 mg total) by mouth 2 (two) times daily. 60 tablet 0    apixaban (ELIQUIS) 5 mg Tab Take 1 tablet (5 mg total) by mouth 2 (two) times daily. 180 tablet 3    benazepriL (LOTENSIN) 40 MG tablet Take one tab by mouth once daily in the evening. 90 tablet 1    fluticasone propionate (FLONASE) 50 mcg/actuation nasal spray 2 sprays (100 mcg total) by Each Nostril route once daily. 16 g 5    metoprolol succinate (TOPROL-XL) 50 MG 24 hr tablet Take 1 tablet (50 mg total) by mouth once daily. 90 tablet 6    sotaloL (BETAPACE) 80 MG tablet Take 1 tablet (80 mg total) by mouth 2 (two) times daily. 60 tablet 0    [START ON 6/6/2022] sotaloL (BETAPACE) 80 MG tablet Take 1 tablet (80 mg total) by mouth 2 (two) times daily. 180 tablet 3    ALPRAZolam (XANAX) 0.25 MG tablet Take 0.125-0.25 mg by mouth once daily.      furosemide (LASIX) 20 MG tablet Take 1 tablet (20 mg total) by mouth daily as needed (for swelling). (Patient not taking: Reported on 5/16/2022) 5 tablet 0    pantoprazole (PROTONIX) 40 MG tablet Take 1 tablet (40 mg total) by mouth once daily. 30 tablet 0     No current facility-administered medications for this visit.       Alpha-1 Antitrypsin:  Last PFT:   Last CT:    Review of Systems  General: Feeling Well.  Eyes: Vision is good.  ENT:  No sinusitis or pharyngitis.   Heart:: No chest pain or  "palpitations.  Lungs: No cough, sputum, or wheezing.  GI: No Nausea, vomiting, constipation, diarrhea, or reflux.  : No dysuria, hesitancy, or nocturia.  Musculoskeletal: No joint pain or myalgias.  Skin: No lesions or rashes.  Neuro: No headaches or neuropathy.  Lymph: No edema or adenopathy.  Psych: No anxiety or depression.  Endo:  Losing weight with a diet    OBJECTIVE:      BP (!) 140/90 (BP Location: Left arm, Patient Position: Sitting, BP Method: Medium (Manual))   Pulse 75   Ht 5' 6" (1.676 m)   Wt 96.2 kg (212 lb)   SpO2 97%   BMI 34.22 kg/m²     Physical Exam  GENERAL: Older patient in no distress.  HEENT: Pupils equal and reactive. Extraocular movements intact. Nose intact.      Pharynx moist.  Status post UPPP with good visualization of the posterior pharynx  NECK: Supple.  18 in  HEART: Regular rate and rhythm. No murmur or gallop auscultated.  LUNGS: Clear to auscultation and percussion. Lung excursion symmetrical. No change in fremitus. No adventitial noises.  ABDOMEN: Bowel sounds present. Non-tender, no masses palpated.  EXTREMITIES: Normal muscle tone and joint movement, no cyanosis or clubbing.   LYMPHATICS: No adenopathy palpated, no edema.  SKIN: Dry, intact, no lesions.   NEURO: Cranial nerves II-XII intact. Motor strength 5/5 bilaterally, upper and lower extremities.  PSYCH: Appropriate affect.    Jay Sleepiness Scale 3  Assessment:       1. RONALDO (obstructive sleep apnea)          Plan:       RONALDO (obstructive sleep apnea)    -     Home Sleep Studies; Future    Patient owns his own CPAP machine and if his home sleep study is positive for sleep apnea, he will resume using it.  If it is not, he will not need to.  As he owns his own machine, following up for his insurance company will not be necessary.     Follow up if symptoms worsen or fail to improve.        "

## 2022-05-23 ENCOUNTER — PROCEDURE VISIT (OUTPATIENT)
Dept: SLEEP MEDICINE | Facility: HOSPITAL | Age: 62
End: 2022-05-23
Attending: INTERNAL MEDICINE
Payer: COMMERCIAL

## 2022-05-23 DIAGNOSIS — G47.33 OSA (OBSTRUCTIVE SLEEP APNEA): ICD-10-CM

## 2022-05-23 PROCEDURE — 95806 SLEEP STUDY UNATT&RESP EFFT: CPT

## 2022-05-26 ENCOUNTER — TELEPHONE (OUTPATIENT)
Dept: PULMONOLOGY | Facility: HOSPITAL | Age: 62
End: 2022-05-26

## 2022-06-01 ENCOUNTER — TELEPHONE (OUTPATIENT)
Dept: FAMILY MEDICINE | Facility: CLINIC | Age: 62
End: 2022-06-01
Payer: COMMERCIAL

## 2022-06-01 DIAGNOSIS — I48.91 ATRIAL FIBRILLATION, NEW ONSET: Primary | ICD-10-CM

## 2022-06-01 RX ORDER — METOPROLOL SUCCINATE 50 MG/1
50 TABLET, EXTENDED RELEASE ORAL DAILY
Qty: 90 TABLET | Refills: 6 | Status: SHIPPED | OUTPATIENT
Start: 2022-06-01 | End: 2022-06-01 | Stop reason: SDUPTHER

## 2022-06-01 NOTE — TELEPHONE ENCOUNTER
----- Message from Neal Siddiqui MA sent at 6/1/2022 12:52 PM CDT -----  Contact: pt  Good afternoon Ami  See below please advise.  Thanks    ----- Message -----  From: Kathie Cedeño  Sent: 6/1/2022  12:11 PM CDT  To: Leida Pope Staff    Type: Needs Medical Advice     Who Called: pt wife-jason Harrison Call Back Number:[phone]  Additional Information: Requesting a call back regarding  pt said that the pcp Dr Gautam said he should not be taking the metoprolol succinate (TOPROL-XL) 50 MG 24 hr tablet because he takes sotaloL (BETAPACE) 80 MG tablet.  Pt wife said the bottle for his original rx said 2x day and the one called in to [facility] said 1 x day.   Pt wife also wants to know why it was called in to [facility] when  they only deal with mail order due to insurance will not cover at [facility].  Pt  wife is confused on if pt should be taking it or not. Pt wife needs office ot call her only.  Please Advise- Thank you

## 2022-06-01 NOTE — TELEPHONE ENCOUNTER
----- Message from Cindy Tejeda sent at 6/1/2022  9:07 AM CDT -----    Patient Call Back    Who Called: Cynthia (wife)    What is the request in detail: pt wife calling to speak with someone regarding her  metoprolol succinate (TOPROL-XL) 50 MG 24 hr tablet. Pls advise.     Can the clinic reply by [facility]?    Best Call Back Number: [phone]

## 2022-06-01 NOTE — TELEPHONE ENCOUNTER
Informed spouse that pt should be currently taking metoprolol 50mg daily and sotalol 80mg BID, she reports the metoprolol was sent in to local pharmacy and it needs to be sent to [facility] for 90 day supply in order for it to be covered by their insurance

## 2022-06-01 NOTE — TELEPHONE ENCOUNTER
Message was sent to cardiology after I talked to wife, she Stated Dr. Santana RX medication and patient is taking both medications, sotalol and metoprolol.   Message was sent to DR. Santana for clarification and refill

## 2022-06-02 DIAGNOSIS — I48.91 ATRIAL FIBRILLATION, NEW ONSET: ICD-10-CM

## 2022-06-02 RX ORDER — METOPROLOL SUCCINATE 50 MG/1
50 TABLET, EXTENDED RELEASE ORAL DAILY
Qty: 90 TABLET | Refills: 6 | Status: SHIPPED | OUTPATIENT
Start: 2022-06-02 | End: 2022-06-02 | Stop reason: SDUPTHER

## 2022-06-02 NOTE — TELEPHONE ENCOUNTER
----- Message from Noemi Mack, Patient Care Assistant sent at 6/2/2022  4:26 PM CDT -----  Regarding: medication  Contact: jason stone's wife  Type: Needs Medical Advice  Who Called:  jason stone's wife   Pharmacy name and phone #:    [facility]  ([facility]) - [address], [facility] [address]  [address] [place] [address], [address] [address]  Phone: [phone] Fax: [phone]    Best Call Back Number: [phone] (home)     Additional Information: jason stone's wife states she would like a callback regarding metoprolol succinate (TOPROL-XL) 50 MG 24 hr tablet to sent to [facility]. Thanks!

## 2022-06-03 RX ORDER — METOPROLOL SUCCINATE 50 MG/1
50 TABLET, EXTENDED RELEASE ORAL DAILY
Qty: 90 TABLET | Refills: 6 | Status: SHIPPED | OUTPATIENT
Start: 2022-06-03 | End: 2022-08-18

## 2022-06-27 ENCOUNTER — OFFICE VISIT (OUTPATIENT)
Dept: CARDIOLOGY | Facility: CLINIC | Age: 62
End: 2022-06-27
Payer: COMMERCIAL

## 2022-06-27 VITALS
HEIGHT: 66 IN | DIASTOLIC BLOOD PRESSURE: 90 MMHG | OXYGEN SATURATION: 96 % | BODY MASS INDEX: 35.8 KG/M2 | SYSTOLIC BLOOD PRESSURE: 152 MMHG | WEIGHT: 222.75 LBS | HEART RATE: 79 BPM

## 2022-06-27 DIAGNOSIS — I48.0 PAROXYSMAL ATRIAL FIBRILLATION: Primary | ICD-10-CM

## 2022-06-27 DIAGNOSIS — I10 HYPERTENSION, UNSPECIFIED TYPE: ICD-10-CM

## 2022-06-27 PROCEDURE — 99215 PR OFFICE/OUTPT VISIT, EST, LEVL V, 40-54 MIN: ICD-10-PCS | Mod: S$GLB,,, | Performed by: SPECIALIST

## 2022-06-27 PROCEDURE — 3008F PR BODY MASS INDEX (BMI) DOCUMENTED: ICD-10-PCS | Mod: CPTII,S$GLB,, | Performed by: SPECIALIST

## 2022-06-27 PROCEDURE — 1160F RVW MEDS BY RX/DR IN RCRD: CPT | Mod: CPTII,S$GLB,, | Performed by: SPECIALIST

## 2022-06-27 PROCEDURE — 3008F BODY MASS INDEX DOCD: CPT | Mod: CPTII,S$GLB,, | Performed by: SPECIALIST

## 2022-06-27 PROCEDURE — 4010F PR ACE/ARB THEARPY RXD/TAKEN: ICD-10-PCS | Mod: CPTII,S$GLB,, | Performed by: SPECIALIST

## 2022-06-27 PROCEDURE — 1160F PR REVIEW ALL MEDS BY PRESCRIBER/CLIN PHARMACIST DOCUMENTED: ICD-10-PCS | Mod: CPTII,S$GLB,, | Performed by: SPECIALIST

## 2022-06-27 PROCEDURE — 99215 OFFICE O/P EST HI 40 MIN: CPT | Mod: S$GLB,,, | Performed by: SPECIALIST

## 2022-06-27 PROCEDURE — 3077F PR MOST RECENT SYSTOLIC BLOOD PRESSURE >= 140 MM HG: ICD-10-PCS | Mod: CPTII,S$GLB,, | Performed by: SPECIALIST

## 2022-06-27 PROCEDURE — 1159F MED LIST DOCD IN RCRD: CPT | Mod: CPTII,S$GLB,, | Performed by: SPECIALIST

## 2022-06-27 PROCEDURE — 4010F ACE/ARB THERAPY RXD/TAKEN: CPT | Mod: CPTII,S$GLB,, | Performed by: SPECIALIST

## 2022-06-27 PROCEDURE — 3077F SYST BP >= 140 MM HG: CPT | Mod: CPTII,S$GLB,, | Performed by: SPECIALIST

## 2022-06-27 PROCEDURE — 3080F PR MOST RECENT DIASTOLIC BLOOD PRESSURE >= 90 MM HG: ICD-10-PCS | Mod: CPTII,S$GLB,, | Performed by: SPECIALIST

## 2022-06-27 PROCEDURE — 1159F PR MEDICATION LIST DOCUMENTED IN MEDICAL RECORD: ICD-10-PCS | Mod: CPTII,S$GLB,, | Performed by: SPECIALIST

## 2022-06-27 PROCEDURE — 3080F DIAST BP >= 90 MM HG: CPT | Mod: CPTII,S$GLB,, | Performed by: SPECIALIST

## 2022-06-27 RX ORDER — TRIAMTERENE AND HYDROCHLOROTHIAZIDE 37.5; 25 MG/1; MG/1
1 CAPSULE ORAL EVERY OTHER DAY
Qty: 45 CAPSULE | Refills: 3 | Status: SHIPPED | OUTPATIENT
Start: 2022-06-27 | End: 2023-06-19 | Stop reason: SDUPTHER

## 2022-06-27 NOTE — PROGRESS NOTES
Subjective:    Patient ID:  Rich Ballard is a 61 y.o. male who presents for   Atrial Fibrillation and Chest Pain (On & off every few days. Only last a few seconds.)    HPI 1)   Little stabbing  Pain   2)  139/90 at home  bp   Little higher   3) feels beeter   On  cpap  For janet   4) severe janet he is being treated for obstructive sleep apnea  His blood pressures been a little bit higher since he has been ablated back in February  He has gained a little bit of weight      Review of patient's allergies indicates:   Allergen Reactions    Amiodarone analogues Shortness Of Breath       Past Medical History:   Diagnosis Date    Allergy     Atrial fibrillation     Hyperlipidemia     Hypertension      Past Surgical History:   Procedure Laterality Date    ABLATION OF ARRHYTHMOGENIC FOCUS FOR ATRIAL FIBRILLATION N/A 4/5/2022    Procedure: ABLATION, ARRHYTHMOGENIC FOCUS, FOR ATRIAL FIBRILLATION;  Surgeon: Niko Casarez MD;  Location: [facility] LAB;  Service: Cardiology;  Laterality: N/A;  AF, GEOVANNI(Cx if SR), PVI, RFA, CARTO, GEN, GP, 3 PREP    APPENDECTOMY      CERVICAL FUSION      COLONOSCOPY N/A 2/1/2017    Procedure: COLONOSCOPY;  Surgeon: Kennedy Soto MD;  Location: [facility] ENDO;  Service: Endoscopy;  Laterality: N/A;    ppp      ROTATOR CUFF REPAIR      TRANSESOPHAGEAL ECHOCARDIOGRAPHY N/A 4/5/2022    Procedure: ECHOCARDIOGRAM, TRANSESOPHAGEAL;  Surgeon: Tray Leal MD;  Location: [facility] LAB;  Service: Cardiology;  Laterality: N/A;    TREATMENT OF CARDIAC ARRHYTHMIA N/A 2/17/2022    Procedure: CARDIOVERSION;  Surgeon: Kaleb Santana MD;  [facility]: [facility] CATH/EP LAB;  Service: Cardiology;  Laterality: N/A;    TREATMENT OF CARDIAC ARRHYTHMIA  4/5/2022    Procedure: Cardioversion or Defibrillation;  Surgeon: Niko Casarez MD;  Location: [facility] LAB;  Service: Cardiology;;    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       Social History     Tobacco Use    Smoking status: Former Smoker     Packs/day: 0.50     Years:  30.00     Pack years: 15.00     Types: Cigarettes     Start date: 10/16/1989     Quit date: 3/1/2022     Years since quittin.3    Smokeless tobacco: Never Used   Substance Use Topics    Alcohol use: No     Comment: very rarely    Drug use: No        Review of Systems     Review of Systems   Constitutional: Positive for weight gain.   HENT: Negative.    Cardiovascular: Positive for dyspnea on exertion, irregular heartbeat and palpitations. Negative for chest pain.   Respiratory: Positive for shortness of breath and sleep disturbances due to breathing.    Endocrine: Negative.    Skin: Negative.    Gastrointestinal: Negative.            Objective:        Vitals:    22 1653   BP: (!) 152/90   Pulse: 79       Lab Results   Component Value Date    WBC 6.42 2022    HGB 15.6 2022    HCT 47.4 2022     2022    CHOL 217 (H) 2021    TRIG 110 2021    HDL 39 (L) 2021    ALT 27 2022    AST 22 2022     2022    K 4.5 2022     2022    CREATININE 0.8 2022    BUN 15 2022    CO2 26 2022    TSH 1.183 2016    PSA 3.4 2021    INR 1.0 2022        ECHOCARDIOGRAM RESULTS  Results for orders placed during the hospital encounter of 22    Transesophageal echo (GEOVANNI)    Interpretation Summary  · The left ventricle is normal in size with mildly decreased systolic function. The estimated ejection fraction is 45%.  · There is mild left ventricular global hypokinesis.  · Normal right ventricular size with normal right ventricular systolic function.  · Biatrial enlargement.  · Normal appearing left atrial appendage. No thrombus is present in the appendage.      CURRENT/PREVIOUS VISIT EKG  Results for orders placed or performed during the hospital encounter of 22   EKG 12-lead    Collection Time: 22  4:48 PM    Narrative    Test Reason : Z51.81,Z79.899,    Vent. Rate : 073 BPM     Atrial Rate : 073  BPM     P-R Int : 150 ms          QRS Dur : 082 ms      QT Int : 404 ms       P-R-T Axes : 041 026 018 degrees     QTc Int : 445 ms    Normal sinus rhythm  Normal ECG  When compared with ECG of 05-APR-2022 16:04,  T wave inversion now evident in Inferior leads  Confirmed by Marito TRINH, Kaleb BARKLEY ([id number]) on 4/9/2022 10:00:43 AM    Referred By: GENNARO EPSTEIN           Confirmed By:Kaleb Devi MD     Results for orders placed during the hospital encounter of 01/20/22    Echo    Interpretation Summary  · The left ventricle is normal in size with concentric remodeling and normal systolic function.  · The estimated ejection fraction is 65%.  · A diastolic pattern consistent with atrial fibrillation observed.  · With normal left atrial pressure.  · Normal right ventricular size with normal right ventricular systolic function.  · Mild mitral regurgitation.  · Normal central venous pressure (3 mmHg).  · The estimated PA systolic pressure is 27 mmHg.    Results for orders placed in visit on 01/24/22    Nuclear Stress Test    Interpretation Summary    The EKG portion of this study is negative for ischemia.    The patient reported no chest pain during the stress test.    During stress, atrial fibrillation is noted.    The nuclear portion of this study will be reported separately.      PHYSICAL EXAM    Physical Exam blood pressure 150/90  Head neck no bruit  Lungs are clear  Cardiac regular  Abdomen obese  No edema    Medication List with Changes/Refills   Current Medications    ALPRAZOLAM (XANAX) 0.25 MG TABLET    Take 0.125-0.25 mg by mouth once daily.    AMLODIPINE (NORVASC) 10 MG TABLET    Take 1 tablet (10 mg total) by mouth every evening.    APIXABAN (ELIQUIS) 5 MG TAB    Take 1 tablet (5 mg total) by mouth 2 (two) times daily.    APIXABAN (ELIQUIS) 5 MG TAB    Take 1 tablet (5 mg total) by mouth 2 (two) times daily.    BENAZEPRIL (LOTENSIN) 40 MG TABLET    Take one tab by mouth once daily in the evening.    FLUTICASONE  PROPIONATE (FLONASE) 50 MCG/ACTUATION NASAL SPRAY    2 sprays (100 mcg total) by Each Nostril route once daily.    FUROSEMIDE (LASIX) 20 MG TABLET    Take 1 tablet (20 mg total) by mouth daily as needed (for swelling).    METOPROLOL SUCCINATE (TOPROL-XL) 50 MG 24 HR TABLET    Take 1 tablet (50 mg total) by mouth once daily.    PANTOPRAZOLE (PROTONIX) 40 MG TABLET    Take 1 tablet (40 mg total) by mouth once daily.    SOTALOL (BETAPACE) 80 MG TABLET    Take 1 tablet (80 mg total) by mouth 2 (two) times daily.   Discontinued Medications    METOPROLOL SUCCINATE (TOPROL-XL) 50 MG 24 HR TABLET    Take 1 tablet ( 50 MG total) daily     SOTALOL (BETAPACE) 80 MG TABLET    Take 1 tablet (80 mg total) by mouth 2 (two) times daily.           Assessment:       Hypertension; status post atrial fibrillation ablation in February; treatment with sotalol and beta-blocker; obesity      I personally reviewed old and new ecg's, lab reports,, xray reports  and  other cardiovascular studies including  echo's, stress tests, angiogram reports, holters,and vascular studies .  In addition I evaluated original cardiac cath  ___echo  ____cxr ______ct ____scan on [employer] or [employer] or other viewing platforms .  I reviewed  office and hospital notes Yes _x___ of  referring providers.    Plan:   He can stay on metoprolol and the sotalol as is heart rate is greater than 65  Needs EKG next visit for QTC  Start exercising  Add Dyazide q.o.d.  Lab and approximately a month's    Problem List Items Addressed This Visit    None          No follow-ups on file.

## 2022-07-05 NOTE — PROGRESS NOTES
Subjective:     HPI    I had the pleasure of seeing Rich Ballard in follow-up for his history of AF. He is a 61M [employer] with HTN, HLD, former tobacco use (quit in 2/2022, 30 pack-years), who was incidentally found to be in AF on a [employer] Physical in 1/2022. This led to a cardiac work-up including an echo (1/2022, EF of 65%) and a 24 hour Holter (1/2022, persistent AF with an average HR of 114 bpm).     In 2/2022 a GEOVANNI/DCCV was performed, restoring sinus rhythm. He was discharged on eliquis but not an antiarrhythmic. He thinks he maintained sinus rhythm for several days. On 1 week follow-up ECG he was back in AF.     On 4/5/2022 a PVI and LA posterior wall isolation was performed. Pre-procedure GEOVANNI showed an EF of 45%. He was discharged on eliquis and sotalol.    Today in the office MR. Ballard feels well, and denies recurrent arrhythmias.    My interpretation of today's ECG is sinus rhythm at 69 bpm with a QTc of 426 ms.    Current Outpatient Medications on File Prior to Visit   Medication Sig Dispense Refill    ALPRAZolam (XANAX) 0.25 MG tablet Take 0.125-0.25 mg by mouth once daily.      apixaban (ELIQUIS) 5 mg Tab Take 1 tablet (5 mg total) by mouth 2 (two) times daily. 60 tablet 0    apixaban (ELIQUIS) 5 mg Tab Take 1 tablet (5 mg total) by mouth 2 (two) times daily. 180 tablet 3    fluticasone propionate (FLONASE) 50 mcg/actuation nasal spray 2 sprays (100 mcg total) by Each Nostril route once daily. 16 g 5    furosemide (LASIX) 20 MG tablet Take 1 tablet (20 mg total) by mouth daily as needed (for swelling). 5 tablet 0    metoprolol succinate (TOPROL-XL) 50 MG 24 hr tablet Take 1 tablet (50 mg total) by mouth once daily. 90 tablet 6    pantoprazole (PROTONIX) 40 MG tablet Take 1 tablet (40 mg total) by mouth once daily. 30 tablet 0    sotaloL (BETAPACE) 80 MG tablet Take 1 tablet (80 mg total) by mouth 2 (two) times daily. 180 tablet 3    triamterene-hydrochlorothiazide 37.5-25 mg  (DYAZIDE) 37.5-25 mg per capsule Take 1 capsule by mouth every other day. 45 capsule 3    [DISCONTINUED] amLODIPine (NORVASC) 10 MG tablet Take 1 tablet (10 mg total) by mouth every evening. 90 tablet 1    [DISCONTINUED] benazepriL (LOTENSIN) 40 MG tablet Take one tab by mouth once daily in the evening. 90 tablet 1     No current facility-administered medications on file prior to visit.       Review of Systems   Constitutional: Negative for decreased appetite, malaise/fatigue, weight gain and weight loss.   HENT: Negative for sore throat.    Eyes: Negative for blurred vision.   Cardiovascular: Positive for chest pain. Negative for dyspnea on exertion, irregular heartbeat, leg swelling, near-syncope, orthopnea, palpitations, paroxysmal nocturnal dyspnea and syncope.   Respiratory: Negative for shortness of breath.    Skin: Negative for rash.   Musculoskeletal: Negative for arthritis.   Gastrointestinal: Negative for abdominal pain.   Neurological: Negative for focal weakness.   Psychiatric/Behavioral: Negative for altered mental status.   All other systems reviewed and are negative.       Objective:    Physical Exam  Vitals and nursing note reviewed.   Constitutional:       General: He is not in acute distress.     Appearance: He is well-developed.   HENT:      Head: Normocephalic and atraumatic.   Eyes:      General: No scleral icterus.     Pupils: Pupils are equal, round, and reactive to light.   Neck:      Thyroid: No thyromegaly.   Cardiovascular:      Rate and Rhythm: Normal rate and regular rhythm.      Pulses: Normal pulses.      Heart sounds: Normal heart sounds. No murmur heard.    No friction rub. No gallop.   Pulmonary:      Effort: Pulmonary effort is normal.      Breath sounds: Normal breath sounds.   Abdominal:      General: Bowel sounds are normal. There is no distension.      Palpations: Abdomen is soft.      Tenderness: There is no abdominal tenderness.   Musculoskeletal:      Cervical back: Neck  supple.   Skin:     General: Skin is warm and dry.      Findings: No rash.   Neurological:      Mental Status: He is alert and oriented to person, place, and time.   Psychiatric:         Behavior: Behavior normal.       Vitals:    07/06/22 1011   BP: 118/82   Pulse: (!) 56   Resp: 16           Assessment:       1. Atrial fibrillation, new onset    2. Primary hypertension    3. Mixed hyperlipidemia         Plan:       In summary, Rich Ballard is a 61M with symptomatic persistent AF. His [id number] Score is 1 (HTN) so he should continue eliquis for now. He is now 3 months status-post PVI and LA posterior wall isolation, and is maintaining sinus rhythm on sotalol.    Plan is to stop eliquis, continue sotalol, follow-up in 6 months.     Thank you for allowing me to participate in the care of this patient. Please do not hesitate to call me with any questions or concerns.

## 2022-07-06 ENCOUNTER — OFFICE VISIT (OUTPATIENT)
Dept: CARDIOLOGY | Facility: CLINIC | Age: 62
End: 2022-07-06
Payer: COMMERCIAL

## 2022-07-06 VITALS
SYSTOLIC BLOOD PRESSURE: 118 MMHG | BODY MASS INDEX: 34.44 KG/M2 | RESPIRATION RATE: 16 BRPM | DIASTOLIC BLOOD PRESSURE: 82 MMHG | HEIGHT: 66 IN | HEART RATE: 56 BPM | OXYGEN SATURATION: 95 % | WEIGHT: 214.31 LBS

## 2022-07-06 DIAGNOSIS — E78.2 MIXED HYPERLIPIDEMIA: ICD-10-CM

## 2022-07-06 DIAGNOSIS — I48.91 ATRIAL FIBRILLATION, NEW ONSET: Primary | ICD-10-CM

## 2022-07-06 DIAGNOSIS — I10 PRIMARY HYPERTENSION: ICD-10-CM

## 2022-07-06 PROCEDURE — 93010 EKG 12-LEAD: ICD-10-PCS | Mod: S$GLB,,, | Performed by: INTERNAL MEDICINE

## 2022-07-06 PROCEDURE — 4010F PR ACE/ARB THEARPY RXD/TAKEN: ICD-10-PCS | Mod: CPTII,S$GLB,, | Performed by: INTERNAL MEDICINE

## 2022-07-06 PROCEDURE — 93010 ELECTROCARDIOGRAM REPORT: CPT | Mod: S$GLB,,, | Performed by: INTERNAL MEDICINE

## 2022-07-06 PROCEDURE — 1159F PR MEDICATION LIST DOCUMENTED IN MEDICAL RECORD: ICD-10-PCS | Mod: CPTII,S$GLB,, | Performed by: INTERNAL MEDICINE

## 2022-07-06 PROCEDURE — 1160F RVW MEDS BY RX/DR IN RCRD: CPT | Mod: CPTII,S$GLB,, | Performed by: INTERNAL MEDICINE

## 2022-07-06 PROCEDURE — 3079F PR MOST RECENT DIASTOLIC BLOOD PRESSURE 80-89 MM HG: ICD-10-PCS | Mod: CPTII,S$GLB,, | Performed by: INTERNAL MEDICINE

## 2022-07-06 PROCEDURE — 1159F MED LIST DOCD IN RCRD: CPT | Mod: CPTII,S$GLB,, | Performed by: INTERNAL MEDICINE

## 2022-07-06 PROCEDURE — 3008F PR BODY MASS INDEX (BMI) DOCUMENTED: ICD-10-PCS | Mod: CPTII,S$GLB,, | Performed by: INTERNAL MEDICINE

## 2022-07-06 PROCEDURE — 99214 PR OFFICE/OUTPT VISIT, EST, LEVL IV, 30-39 MIN: ICD-10-PCS | Mod: S$GLB,,, | Performed by: INTERNAL MEDICINE

## 2022-07-06 PROCEDURE — 3074F SYST BP LT 130 MM HG: CPT | Mod: CPTII,S$GLB,, | Performed by: INTERNAL MEDICINE

## 2022-07-06 PROCEDURE — 1160F PR REVIEW ALL MEDS BY PRESCRIBER/CLIN PHARMACIST DOCUMENTED: ICD-10-PCS | Mod: CPTII,S$GLB,, | Performed by: INTERNAL MEDICINE

## 2022-07-06 PROCEDURE — 3079F DIAST BP 80-89 MM HG: CPT | Mod: CPTII,S$GLB,, | Performed by: INTERNAL MEDICINE

## 2022-07-06 PROCEDURE — 93005 ELECTROCARDIOGRAM TRACING: CPT | Mod: S$GLB,,, | Performed by: INTERNAL MEDICINE

## 2022-07-06 PROCEDURE — 99214 OFFICE O/P EST MOD 30 MIN: CPT | Mod: S$GLB,,, | Performed by: INTERNAL MEDICINE

## 2022-07-06 PROCEDURE — 3008F BODY MASS INDEX DOCD: CPT | Mod: CPTII,S$GLB,, | Performed by: INTERNAL MEDICINE

## 2022-07-06 PROCEDURE — 3074F PR MOST RECENT SYSTOLIC BLOOD PRESSURE < 130 MM HG: ICD-10-PCS | Mod: CPTII,S$GLB,, | Performed by: INTERNAL MEDICINE

## 2022-07-06 PROCEDURE — 93005 EKG 12-LEAD: ICD-10-PCS | Mod: S$GLB,,, | Performed by: INTERNAL MEDICINE

## 2022-07-06 PROCEDURE — 4010F ACE/ARB THERAPY RXD/TAKEN: CPT | Mod: CPTII,S$GLB,, | Performed by: INTERNAL MEDICINE

## 2022-09-20 ENCOUNTER — OFFICE VISIT (OUTPATIENT)
Dept: CARDIOLOGY | Facility: CLINIC | Age: 62
End: 2022-09-20
Payer: COMMERCIAL

## 2022-09-20 VITALS
HEART RATE: 77 BPM | WEIGHT: 217.38 LBS | BODY MASS INDEX: 34.93 KG/M2 | OXYGEN SATURATION: 97 % | SYSTOLIC BLOOD PRESSURE: 122 MMHG | DIASTOLIC BLOOD PRESSURE: 80 MMHG | HEIGHT: 66 IN

## 2022-09-20 DIAGNOSIS — I48.91 ATRIAL FIBRILLATION, NEW ONSET: Primary | ICD-10-CM

## 2022-09-20 DIAGNOSIS — G47.33 OSA (OBSTRUCTIVE SLEEP APNEA): ICD-10-CM

## 2022-09-20 DIAGNOSIS — I10 HYPERTENSION, UNSPECIFIED TYPE: ICD-10-CM

## 2022-09-20 DIAGNOSIS — R00.0 TACHYCARDIA: ICD-10-CM

## 2022-09-20 PROCEDURE — 3074F SYST BP LT 130 MM HG: CPT | Mod: CPTII,S$GLB,, | Performed by: SPECIALIST

## 2022-09-20 PROCEDURE — 3079F PR MOST RECENT DIASTOLIC BLOOD PRESSURE 80-89 MM HG: ICD-10-PCS | Mod: CPTII,S$GLB,, | Performed by: SPECIALIST

## 2022-09-20 PROCEDURE — 1160F PR REVIEW ALL MEDS BY PRESCRIBER/CLIN PHARMACIST DOCUMENTED: ICD-10-PCS | Mod: CPTII,S$GLB,, | Performed by: SPECIALIST

## 2022-09-20 PROCEDURE — 99214 PR OFFICE/OUTPT VISIT, EST, LEVL IV, 30-39 MIN: ICD-10-PCS | Mod: S$GLB,,, | Performed by: SPECIALIST

## 2022-09-20 PROCEDURE — 4010F ACE/ARB THERAPY RXD/TAKEN: CPT | Mod: CPTII,S$GLB,, | Performed by: SPECIALIST

## 2022-09-20 PROCEDURE — 3074F PR MOST RECENT SYSTOLIC BLOOD PRESSURE < 130 MM HG: ICD-10-PCS | Mod: CPTII,S$GLB,, | Performed by: SPECIALIST

## 2022-09-20 PROCEDURE — 3008F PR BODY MASS INDEX (BMI) DOCUMENTED: ICD-10-PCS | Mod: CPTII,S$GLB,, | Performed by: SPECIALIST

## 2022-09-20 PROCEDURE — 99214 OFFICE O/P EST MOD 30 MIN: CPT | Mod: S$GLB,,, | Performed by: SPECIALIST

## 2022-09-20 PROCEDURE — 1160F RVW MEDS BY RX/DR IN RCRD: CPT | Mod: CPTII,S$GLB,, | Performed by: SPECIALIST

## 2022-09-20 PROCEDURE — 3079F DIAST BP 80-89 MM HG: CPT | Mod: CPTII,S$GLB,, | Performed by: SPECIALIST

## 2022-09-20 PROCEDURE — 1159F MED LIST DOCD IN RCRD: CPT | Mod: CPTII,S$GLB,, | Performed by: SPECIALIST

## 2022-09-20 PROCEDURE — 1159F PR MEDICATION LIST DOCUMENTED IN MEDICAL RECORD: ICD-10-PCS | Mod: CPTII,S$GLB,, | Performed by: SPECIALIST

## 2022-09-20 PROCEDURE — 3008F BODY MASS INDEX DOCD: CPT | Mod: CPTII,S$GLB,, | Performed by: SPECIALIST

## 2022-09-20 PROCEDURE — 4010F PR ACE/ARB THEARPY RXD/TAKEN: ICD-10-PCS | Mod: CPTII,S$GLB,, | Performed by: SPECIALIST

## 2022-09-20 NOTE — PROGRESS NOTES
Subjective:    Patient ID:  Rich Ballard is a 62 y.o. male who presents for   Atrial Fibrillation, Hypertension, and Hyperlipidemia    HPI  Status post atrial flutter ablation is doing well and is not on anticoagulation  And blood pressure is under moderately good control at home  He has lost a little bit of weight and quit smoking  He is not even on aspirin  Review of patient's allergies indicates:   Allergen Reactions    Amiodarone analogues Shortness Of Breath       Past Medical History:   Diagnosis Date    Allergy     Atrial fibrillation     Hyperlipidemia     Hypertension      Past Surgical History:   Procedure Laterality Date    ABLATION OF ARRHYTHMOGENIC FOCUS FOR ATRIAL FIBRILLATION N/A 2022    Procedure: ABLATION, ARRHYTHMOGENIC FOCUS, FOR ATRIAL FIBRILLATION;  Surgeon: Niko Casarez MD;  Location: [facility] EP LAB;  Service: Cardiology;  Laterality: N/A;  AF, GEOVANNI(Cx if SR), PVI, RFA, CARTO, GEN, GP, 3 PREP    APPENDECTOMY      CERVICAL FUSION      COLONOSCOPY N/A 2017    Procedure: COLONOSCOPY;  Surgeon: Kennedy Soto MD;  Location: [facility] ENDO;  Service: Endoscopy;  Laterality: N/A;    ppp      ROTATOR CUFF REPAIR      TRANSESOPHAGEAL ECHOCARDIOGRAPHY N/A 2022    Procedure: ECHOCARDIOGRAM, TRANSESOPHAGEAL;  Surgeon: Tray Leal MD;  Location: [facility] EP LAB;  Service: Cardiology;  Laterality: N/A;    TREATMENT OF CARDIAC ARRHYTHMIA N/A 2022    Procedure: CARDIOVERSION;  Surgeon: Kaleb Santana MD;  Location: [facility] CATH/EP LAB;  Service: Cardiology;  Laterality: N/A;    TREATMENT OF CARDIAC ARRHYTHMIA  2022    Procedure: Cardioversion or Defibrillation;  Surgeon: Niko Casarez MD;  Location: [facility] EP LAB;  Service: Cardiology;;    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       Social History     Tobacco Use    Smoking status: Former     Packs/day: 0.50     Years: 30.00     Pack years: 15.00     Types: Cigarettes     Start date: 10/16/1989     Quit date: 3/1/2022     Years since quittin.5     Smokeless tobacco: Never   Substance Use Topics    Alcohol use: No     Comment: very rarely    Drug use: No        Review of Systems     ROS        Objective:        Vitals:    09/20/22 1642   BP: 122/80   Pulse: 77       Lab Results   Component Value Date    WBC 6.42 04/01/2022    HGB 15.6 04/01/2022    HCT 47.4 04/01/2022     04/01/2022    CHOL 217 (H) 07/07/2021    TRIG 110 07/07/2021    HDL 39 (L) 07/07/2021    ALT 27 01/27/2022    AST 22 01/27/2022     04/01/2022    K 4.5 04/01/2022     04/01/2022    CREATININE 0.8 04/01/2022    BUN 15 04/01/2022    CO2 26 04/01/2022    TSH 1.183 12/23/2016    PSA 3.4 07/07/2021    INR 1.0 04/01/2022        ECHOCARDIOGRAM RESULTS  Results for orders placed during the hospital encounter of 04/05/22    Transesophageal echo (GEOVANNI)    Interpretation Summary  · The left ventricle is normal in size with mildly decreased systolic function. The estimated ejection fraction is 45%.  · There is mild left ventricular global hypokinesis.  · Normal right ventricular size with normal right ventricular systolic function.  · Biatrial enlargement.  · Normal appearing left atrial appendage. No thrombus is present in the appendage.      CURRENT/PREVIOUS VISIT EKG  Results for orders placed or performed in visit on 07/06/22   IN OFFICE EKG 12-LEAD (to [place])    Collection Time: 07/06/22 10:16 AM    Narrative    Test Reason : I48.91,    Vent. Rate : 069 BPM     Atrial Rate : 069 BPM     P-R Int : 160 ms          QRS Dur : 088 ms      QT Int : 398 ms       P-R-T Axes : 056 062 043 degrees     QTc Int : 426 ms    Normal sinus rhythm  Normal ECG  When compared with ECG of 08-APR-2022 16:48,  No significant change was found  Confirmed by Cy Lombardi MD ([id number]) on 7/20/2022 9:20:54 PM    Referred By:  THOMAS           Confirmed By:Cy Lombardi MD     Results for orders placed during the hospital encounter of 01/20/22    Echo    Interpretation Summary  · The left ventricle is normal  in size with concentric remodeling and normal systolic function.  · The estimated ejection fraction is 65%.  · A diastolic pattern consistent with atrial fibrillation observed.  · With normal left atrial pressure.  · Normal right ventricular size with normal right ventricular systolic function.  · Mild mitral regurgitation.  · Normal central venous pressure (3 mmHg).  · The estimated PA systolic pressure is 27 mmHg.    Results for orders placed in visit on 01/24/22    Nuclear Stress Test    Interpretation Summary    The EKG portion of this study is negative for ischemia.    The patient reported no chest pain during the stress test.    During stress, atrial fibrillation is noted.    The nuclear portion of this study will be reported separately.      PHYSICAL EXAM    Physical Exam 140/85  Head neck no bruit  Lungs are clear  Cardiac regular  Abdomen extremities normal    Medication List with Changes/Refills   Current Medications    ALPRAZOLAM (XANAX) 0.25 MG TABLET    Take 0.125-0.25 mg by mouth once daily.    AMLODIPINE (NORVASC) 10 MG TABLET    TAKE 1 TABLET BY MOUTH IN  THE EVENING    BENAZEPRIL (LOTENSIN) 40 MG TABLET    TAKE 1 TABLET BY MOUTH ONCE DAILY IN THE EVENING    FLUTICASONE PROPIONATE (FLONASE) 50 MCG/ACTUATION NASAL SPRAY    2 sprays (100 mcg total) by Each Nostril route once daily.    FUROSEMIDE (LASIX) 20 MG TABLET    Take 1 tablet (20 mg total) by mouth daily as needed (for swelling).    METOPROLOL SUCCINATE (TOPROL-XL) 50 MG 24 HR TABLET    TAKE 1 TABLET BY MOUTH  DAILY    PANTOPRAZOLE (PROTONIX) 40 MG TABLET    Take 1 tablet (40 mg total) by mouth once daily.    SOTALOL (BETAPACE) 80 MG TABLET    Take 1 tablet (80 mg total) by mouth 2 (two) times daily.    TRIAMTERENE-HYDROCHLOROTHIAZIDE 37.5-25 MG (DYAZIDE) 37.5-25 MG PER CAPSULE    Take 1 capsule by mouth every other day.           Assessment:     Hypertension     Status post atrial flutter ablation successful  Plan:   Return to clinic in 6  months    Problem List Items Addressed This Visit          Cardiac/Vascular    Hypertension    Tachycardia    Atrial fibrillation, new onset - Primary    Relevant Orders    Basic Metabolic Panel     Other Visit Diagnoses       RONALDO (obstructive sleep apnea)        BMI 35.0-35.9,adult                 Follow up in about 6 months (around 3/20/2023).

## 2022-10-11 ENCOUNTER — TELEPHONE (OUTPATIENT)
Dept: ELECTROPHYSIOLOGY | Facility: CLINIC | Age: 62
End: 2022-10-11
Payer: COMMERCIAL

## 2022-10-11 ENCOUNTER — TELEPHONE (OUTPATIENT)
Dept: FAMILY MEDICINE | Facility: CLINIC | Age: 62
End: 2022-10-11
Payer: COMMERCIAL

## 2022-10-11 NOTE — TELEPHONE ENCOUNTER
----- Message from Lucian Felix sent at 10/11/2022 12:11 PM CDT -----  Contact: p wife  Type:  Patient Returning Call    Who Called:  Pt wife  Who Left Message for Patient:  Riana  Does the patient know what this is regarding?:  Yes  Best Call Back Number:  [phone]    Additional Information:  Pt wife was returning call they missed stated to please call back when available Thank you

## 2022-10-11 NOTE — TELEPHONE ENCOUNTER
Returned call to pt to advise I called [facility] and okayed the prescriptions to be filled together.        ----- Message from Alexandr Earl MA sent at 10/11/2022  2:10 PM CDT -----  Regarding: FW: medication issues  Good afternoon,     Please see below.     Thanks,  Alexandr Earl MA     ----- Message -----  From: Edda Vasquez  Sent: 10/11/2022   1:06 PM CDT  To: Leida Pope Staff  Subject: medication issues                                sotaloL (BETAPACE) 80 MG tablet  metoprolol succinate (TOPROL-XL) 50 MG 24 hr tablet    Pls call pt's wife at [phone].  She says that [facility]  ([facility]) - [place] [address] [address] [facility]   Phone:  [phone]  Fax:  [phone]      Is giving her a hard time with refills b/s they told her those 2 meds should not be taken together and says she has had this problem in the past and you need to call and ok it.    Thank you

## 2022-10-11 NOTE — TELEPHONE ENCOUNTER
----- Message from Elidia Celestin sent at 10/11/2022 11:38 AM CDT -----  Contact: [phone]  Type: Needs Medical Advice  Who Called:  Pts Wife Cynthia    Best Call Back Number: [phone]  Additional Information:Pts wife is calling about pts rxs. Office needs to contact [facility] about pts medications. Pls call pts wife back about this for further details-per pts wife Cynthia

## 2022-10-11 NOTE — TELEPHONE ENCOUNTER
Called [facility] and okayed medication to be given together.        ----- Message from Alexandr Earl MA sent at 10/11/2022  2:11 PM CDT -----  Regarding: FW: Drug interaction  Same message earlier.   ----- Message -----  From: Amanda Trevino  Sent: 10/11/2022   2:02 PM CDT  To: Leida Pope Staff  Subject: Drug interaction                                 Pharmacy called about interaction of:      sotaloL (BETAPACE) 80 MG tablet     metoprolol succinate (TOPROL-XL) 50 MG 24 hr tablet        [facility]  [phone] ref # [id number]

## 2023-01-23 ENCOUNTER — TELEPHONE (OUTPATIENT)
Dept: CARDIOLOGY | Facility: CLINIC | Age: 63
End: 2023-01-23
Payer: COMMERCIAL

## 2023-03-20 NOTE — PROGRESS NOTES
Subjective:     HPI    I had the pleasure of seeing Rich Ballard in follow-up for his history of AF. Last visit 7/2022. He is a 62M [employer] with HTN, HLD, former tobacco use (quit in 2/2022, 30 pack-years), who was incidentally found to be in AF on a [employer] Physical in 1/2022. This led to a cardiac work-up including an echo (1/2022, EF of 65%) and a 24 hour Holter (1/2022, persistent AF with an average HR of 114 bpm).     In 2/2022 a GEOVANNI/DCCV was performed, restoring sinus rhythm. He was discharged on eliquis but not an antiarrhythmic. He thinks he maintained sinus rhythm for several days. On 1 week follow-up ECG he was back in AF.     On 4/5/2022 a PVI and LA posterior wall isolation was performed. Pre-procedure GEOVANNI showed an EF of 45%. He was discharged on eliquis and sotalol.    At his 7/2022 visit Mr. Ballard felt well, and denied recurrent arrhythmias. Eliquis stopped at that point.    Today in the office Mr. Ballard denies recurrent AF.     My interpretation of today's ECG is sinus rhythm at 73 bpm with a QTc of 445 ms.    Current Outpatient Medications on File Prior to Visit   Medication Sig Dispense Refill    ALPRAZolam (XANAX) 0.25 MG tablet Take 0.125-0.25 mg by mouth once daily.      amLODIPine (NORVASC) 10 MG tablet TAKE 1 TABLET BY MOUTH IN  THE EVENING 90 tablet 3    benazepriL (LOTENSIN) 40 MG tablet TAKE 1 TABLET BY MOUTH ONCE DAILY IN THE EVENING 90 tablet 3    fluticasone propionate (FLONASE) 50 mcg/actuation nasal spray 2 sprays (100 mcg total) by Each Nostril route once daily. 16 g 5    furosemide (LASIX) 20 MG tablet Take 1 tablet (20 mg total) by mouth daily as needed (for swelling). 5 tablet 0    metoprolol succinate (TOPROL-XL) 50 MG 24 hr tablet TAKE 1 TABLET BY MOUTH  DAILY 90 tablet 3    pantoprazole (PROTONIX) 40 MG tablet Take 1 tablet (40 mg total) by mouth once daily. 30 tablet 0    sotaloL (BETAPACE) 80 MG tablet Take 1 tablet (80 mg total) by mouth 2 (two) times daily.  180 tablet 3    triamterene-hydrochlorothiazide 37.5-25 mg (DYAZIDE) 37.5-25 mg per capsule Take 1 capsule by mouth every other day. 45 capsule 3     No current facility-administered medications on file prior to visit.       Review of Systems   Constitutional: Negative for decreased appetite, malaise/fatigue, weight gain and weight loss.   HENT:  Negative for sore throat.    Eyes:  Negative for blurred vision.   Cardiovascular:  Positive for chest pain. Negative for dyspnea on exertion, irregular heartbeat, leg swelling, near-syncope, orthopnea, palpitations, paroxysmal nocturnal dyspnea and syncope.   Respiratory:  Negative for shortness of breath.    Skin:  Negative for rash.   Musculoskeletal:  Negative for arthritis.   Gastrointestinal:  Negative for abdominal pain.   Neurological:  Negative for focal weakness.   Psychiatric/Behavioral:  Negative for altered mental status.    All other systems reviewed and are negative.     Objective:    Physical Exam  Vitals and nursing note reviewed.   Constitutional:       General: He is not in acute distress.     Appearance: He is well-developed.   HENT:      Head: Normocephalic and atraumatic.   Eyes:      General: No scleral icterus.     Pupils: Pupils are equal, round, and reactive to light.   Neck:      Thyroid: No thyromegaly.   Cardiovascular:      Rate and Rhythm: Normal rate and regular rhythm.      Pulses: Normal pulses.      Heart sounds: Normal heart sounds. No murmur heard.    No friction rub. No gallop.   Pulmonary:      Effort: Pulmonary effort is normal.      Breath sounds: Normal breath sounds.   Abdominal:      General: Bowel sounds are normal. There is no distension.      Palpations: Abdomen is soft.      Tenderness: There is no abdominal tenderness.   Musculoskeletal:      Cervical back: Neck supple.   Skin:     General: Skin is warm and dry.      Findings: No rash.   Neurological:      Mental Status: He is alert and oriented to person, place, and time.    Psychiatric:         Behavior: Behavior normal.     There were no vitals filed for this visit.          Assessment:       1. Atrial fibrillation, new onset    2. Primary hypertension    3. Mixed hyperlipidemia         Plan:       In summary, Rich Ballard is a 62M with symptomatic persistent AF. His [id number] Score is 1 (HTN). He is now 1 year status-post PVI and LA posterior wall isolation, and is maintaining sinus rhythm on sotalol.    Mr. Ballard's preference at this time is to continue sotalol at current dosing. Plan for 1 year follow-up.     Thank you for allowing me to participate in the care of this patient. Please do not hesitate to call me with any questions or concerns.

## 2023-03-22 ENCOUNTER — OFFICE VISIT (OUTPATIENT)
Dept: CARDIOLOGY | Facility: CLINIC | Age: 63
End: 2023-03-22
Payer: COMMERCIAL

## 2023-03-22 VITALS
DIASTOLIC BLOOD PRESSURE: 90 MMHG | HEIGHT: 66 IN | WEIGHT: 236.56 LBS | RESPIRATION RATE: 16 BRPM | BODY MASS INDEX: 38.02 KG/M2 | SYSTOLIC BLOOD PRESSURE: 140 MMHG | OXYGEN SATURATION: 94 % | HEART RATE: 75 BPM

## 2023-03-22 DIAGNOSIS — E78.2 MIXED HYPERLIPIDEMIA: ICD-10-CM

## 2023-03-22 DIAGNOSIS — I10 PRIMARY HYPERTENSION: ICD-10-CM

## 2023-03-22 DIAGNOSIS — I48.91 ATRIAL FIBRILLATION, NEW ONSET: Primary | ICD-10-CM

## 2023-03-22 PROCEDURE — 93005 EKG 12-LEAD: ICD-10-PCS | Mod: S$GLB,,, | Performed by: INTERNAL MEDICINE

## 2023-03-22 PROCEDURE — 93005 ELECTROCARDIOGRAM TRACING: CPT | Mod: S$GLB,,, | Performed by: INTERNAL MEDICINE

## 2023-03-22 PROCEDURE — 4010F PR ACE/ARB THEARPY RXD/TAKEN: ICD-10-PCS | Mod: CPTII,S$GLB,, | Performed by: INTERNAL MEDICINE

## 2023-03-22 PROCEDURE — 1160F PR REVIEW ALL MEDS BY PRESCRIBER/CLIN PHARMACIST DOCUMENTED: ICD-10-PCS | Mod: CPTII,S$GLB,, | Performed by: INTERNAL MEDICINE

## 2023-03-22 PROCEDURE — 3008F PR BODY MASS INDEX (BMI) DOCUMENTED: ICD-10-PCS | Mod: CPTII,S$GLB,, | Performed by: INTERNAL MEDICINE

## 2023-03-22 PROCEDURE — 3077F PR MOST RECENT SYSTOLIC BLOOD PRESSURE >= 140 MM HG: ICD-10-PCS | Mod: CPTII,S$GLB,, | Performed by: INTERNAL MEDICINE

## 2023-03-22 PROCEDURE — 93010 ELECTROCARDIOGRAM REPORT: CPT | Mod: S$GLB,,, | Performed by: INTERNAL MEDICINE

## 2023-03-22 PROCEDURE — 1159F PR MEDICATION LIST DOCUMENTED IN MEDICAL RECORD: ICD-10-PCS | Mod: CPTII,S$GLB,, | Performed by: INTERNAL MEDICINE

## 2023-03-22 PROCEDURE — 93010 EKG 12-LEAD: ICD-10-PCS | Mod: S$GLB,,, | Performed by: INTERNAL MEDICINE

## 2023-03-22 PROCEDURE — 1160F RVW MEDS BY RX/DR IN RCRD: CPT | Mod: CPTII,S$GLB,, | Performed by: INTERNAL MEDICINE

## 2023-03-22 PROCEDURE — 3008F BODY MASS INDEX DOCD: CPT | Mod: CPTII,S$GLB,, | Performed by: INTERNAL MEDICINE

## 2023-03-22 PROCEDURE — 99214 OFFICE O/P EST MOD 30 MIN: CPT | Mod: S$GLB,,, | Performed by: INTERNAL MEDICINE

## 2023-03-22 PROCEDURE — 4010F ACE/ARB THERAPY RXD/TAKEN: CPT | Mod: CPTII,S$GLB,, | Performed by: INTERNAL MEDICINE

## 2023-03-22 PROCEDURE — 99214 PR OFFICE/OUTPT VISIT, EST, LEVL IV, 30-39 MIN: ICD-10-PCS | Mod: S$GLB,,, | Performed by: INTERNAL MEDICINE

## 2023-03-22 PROCEDURE — 3080F DIAST BP >= 90 MM HG: CPT | Mod: CPTII,S$GLB,, | Performed by: INTERNAL MEDICINE

## 2023-03-22 PROCEDURE — 3080F PR MOST RECENT DIASTOLIC BLOOD PRESSURE >= 90 MM HG: ICD-10-PCS | Mod: CPTII,S$GLB,, | Performed by: INTERNAL MEDICINE

## 2023-03-22 PROCEDURE — 3077F SYST BP >= 140 MM HG: CPT | Mod: CPTII,S$GLB,, | Performed by: INTERNAL MEDICINE

## 2023-03-22 PROCEDURE — 1159F MED LIST DOCD IN RCRD: CPT | Mod: CPTII,S$GLB,, | Performed by: INTERNAL MEDICINE

## 2023-03-22 NOTE — LETTER
March 22, 2023      [facility]  [address], [address]  [place] [address]  Phone: [phone]  Fax: [phone]       Patient: Rich Ballard   YOB: 1960  Date of Visit: 03/22/2023    To Whom It May Concern:    Williams Ballard  was at [facility] on 03/22/2023. The patient may return to work on 3/24/2023 with no restrictions. If you have any questions or concerns, or if I can be of further assistance, please do not hesitate to contact me or Dr Casarez.    Sincerely,      Dr. Tom Casarez MD      No

## 2023-03-27 ENCOUNTER — OFFICE VISIT (OUTPATIENT)
Dept: CARDIOLOGY | Facility: CLINIC | Age: 63
End: 2023-03-27
Payer: COMMERCIAL

## 2023-03-27 VITALS
BODY MASS INDEX: 37.77 KG/M2 | SYSTOLIC BLOOD PRESSURE: 130 MMHG | OXYGEN SATURATION: 97 % | HEIGHT: 66 IN | DIASTOLIC BLOOD PRESSURE: 78 MMHG | WEIGHT: 235 LBS | HEART RATE: 83 BPM

## 2023-03-27 DIAGNOSIS — I10 ESSENTIAL HYPERTENSION: ICD-10-CM

## 2023-03-27 DIAGNOSIS — E66.01 SEVERE OBESITY WITH BODY MASS INDEX (BMI) OF 35.0 TO 39.9 WITH COMORBIDITY: ICD-10-CM

## 2023-03-27 DIAGNOSIS — E78.2 MIXED HYPERLIPIDEMIA: ICD-10-CM

## 2023-03-27 DIAGNOSIS — I10 PRIMARY HYPERTENSION: Primary | ICD-10-CM

## 2023-03-27 DIAGNOSIS — I48.91 ATRIAL FIBRILLATION, NEW ONSET: ICD-10-CM

## 2023-03-27 PROCEDURE — 99999 PR PBB SHADOW E&M-EST. PATIENT-LVL IV: ICD-10-PCS | Mod: PBBFAC,,,

## 2023-03-27 PROCEDURE — 1160F PR REVIEW ALL MEDS BY PRESCRIBER/CLIN PHARMACIST DOCUMENTED: ICD-10-PCS | Mod: CPTII,S$GLB,,

## 2023-03-27 PROCEDURE — 1159F PR MEDICATION LIST DOCUMENTED IN MEDICAL RECORD: ICD-10-PCS | Mod: CPTII,S$GLB,,

## 2023-03-27 PROCEDURE — 3075F PR MOST RECENT SYSTOLIC BLOOD PRESS GE 130-139MM HG: ICD-10-PCS | Mod: CPTII,S$GLB,,

## 2023-03-27 PROCEDURE — 3008F BODY MASS INDEX DOCD: CPT | Mod: CPTII,S$GLB,,

## 2023-03-27 PROCEDURE — 1160F RVW MEDS BY RX/DR IN RCRD: CPT | Mod: CPTII,S$GLB,,

## 2023-03-27 PROCEDURE — 1159F MED LIST DOCD IN RCRD: CPT | Mod: CPTII,S$GLB,,

## 2023-03-27 PROCEDURE — 3075F SYST BP GE 130 - 139MM HG: CPT | Mod: CPTII,S$GLB,,

## 2023-03-27 PROCEDURE — 99214 PR OFFICE/OUTPT VISIT, EST, LEVL IV, 30-39 MIN: ICD-10-PCS | Mod: S$GLB,,,

## 2023-03-27 PROCEDURE — 3078F PR MOST RECENT DIASTOLIC BLOOD PRESSURE < 80 MM HG: ICD-10-PCS | Mod: CPTII,S$GLB,,

## 2023-03-27 PROCEDURE — 4010F ACE/ARB THERAPY RXD/TAKEN: CPT | Mod: CPTII,S$GLB,,

## 2023-03-27 PROCEDURE — 3078F DIAST BP <80 MM HG: CPT | Mod: CPTII,S$GLB,,

## 2023-03-27 PROCEDURE — 99999 PR PBB SHADOW E&M-EST. PATIENT-LVL IV: CPT | Mod: PBBFAC,,,

## 2023-03-27 PROCEDURE — 4010F PR ACE/ARB THEARPY RXD/TAKEN: ICD-10-PCS | Mod: CPTII,S$GLB,,

## 2023-03-27 PROCEDURE — 99214 OFFICE O/P EST MOD 30 MIN: CPT | Mod: S$GLB,,,

## 2023-03-27 PROCEDURE — 3008F PR BODY MASS INDEX (BMI) DOCUMENTED: ICD-10-PCS | Mod: CPTII,S$GLB,,

## 2023-03-27 RX ORDER — TERAZOSIN 1 MG/1
1 CAPSULE ORAL NIGHTLY
Qty: 30 CAPSULE | Refills: 11 | Status: SHIPPED | OUTPATIENT
Start: 2023-03-27 | End: 2024-03-26

## 2023-03-27 NOTE — PROGRESS NOTES
Subjective:    Patient ID:  Rich Ballard is a 62 y.o. male patient here for evaluation Follow-up and Hypertension      History of Present Illness:     Patient is here for a follow up.  He is doing well overall.  Patient reports having systolic BP is in 140s to 150s and diastolic BP in lower 90s.  He recently saw Dr. Casarez for follow-up on ablation on 04/05/2022.  He remains in sinus rhythm.  No medication changes during appointment with Dr. Casarez.  He denies chest pain, palpitations, shortness or breath, lightheadedness, dizziness, edema or bleeding.  He was had approximately 15 lb weight gain since September of last year.  He reports eating whatever he wants due to increased stress.  He is compliant with wearing his CPAP nightly.        Review of patient's allergies indicates:   Allergen Reactions    Amiodarone analogues Shortness Of Breath       Past Medical History:   Diagnosis Date    Allergy     Atrial fibrillation     Hyperlipidemia     Hypertension      Past Surgical History:   Procedure Laterality Date    ABLATION OF ARRHYTHMOGENIC FOCUS FOR ATRIAL FIBRILLATION N/A 4/5/2022    Procedure: ABLATION, ARRHYTHMOGENIC FOCUS, FOR ATRIAL FIBRILLATION;  Surgeon: Niko Caasrez MD;  Location: [facility] EP LAB;  Service: Cardiology;  Laterality: N/A;  AF, GEOVANNI(Cx if SR), PVI, RFA, CARTO, GEN, GP, 3 PREP    APPENDECTOMY      CERVICAL FUSION      COLONOSCOPY N/A 2/1/2017    Procedure: COLONOSCOPY;  Surgeon: Kennedy Soto MD;  Location: [facility] ENDO;  Service: Endoscopy;  Laterality: N/A;    ppp      ROTATOR CUFF REPAIR      TRANSESOPHAGEAL ECHOCARDIOGRAPHY N/A 4/5/2022    Procedure: ECHOCARDIOGRAM, TRANSESOPHAGEAL;  Surgeon: Tray Leal MD;  Location: [facility] EP LAB;  Service: Cardiology;  Laterality: N/A;    TREATMENT OF CARDIAC ARRHYTHMIA N/A 2/17/2022    Procedure: CARDIOVERSION;  Surgeon: Kaleb Santana MD;  Location: [facility] CATH/EP LAB;  Service: Cardiology;  Laterality: N/A;    TREATMENT OF CARDIAC ARRHYTHMIA   2022    Procedure: Cardioversion or Defibrillation;  Surgeon: Niko Casarez MD;  Location: [facility] LAB;  Service: Cardiology;;    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       Social History     Tobacco Use    Smoking status: Former     Packs/day: 0.50     Years: 30.00     Pack years: 15.00     Types: Cigarettes     Start date: 10/16/1989     Quit date: 3/1/2022     Years since quittin.0    Smokeless tobacco: Never   Substance Use Topics    Alcohol use: No     Comment: very rarely    Drug use: No        Review of Systems:    As noted in HPI in addition      REVIEW OF SYSTEMS  CARDIOVASCULAR: +HTN No recent chest pain, palpitations, arm, neck, or jaw pain  RESPIRATORY: No recent fever, cough chills, SOB or congestion  : No blood in the urine  GI: No Nausea, vomiting, constipation, diarrhea, blood, or reflux.  MUSCULOSKELETAL: No myalgias  NEURO: No lightheadedness or dizziness  EYES: No Double vision, blurry, vision or headache              Objective        Vitals:    23 1300   BP: 130/78   Pulse: 83       LIPIDS - LAST 2   Lab Results   Component Value Date    CHOL 217 (H) 2021    CHOL 212 (H) 2018    HDL 39 (L) 2021    HDL 31 (L) 2018    LDLCALC 156.0 2021    LDLCALC 146.6 2018    TRIG 110 2021    TRIG 172 (H) 2018    CHOLHDL 18.0 (L) 2021    CHOLHDL 14.6 (L) 2018       CBC - LAST 2  Lab Results   Component Value Date    WBC 6.42 2022    WBC 5.49 2022    RBC 5.05 2022    RBC 5.09 2022    HGB 15.6 2022    HGB 15.3 2022    HCT 47.4 2022    HCT 47.8 2022    MCV 94 2022    MCV 94 2022    MCH 30.9 2022    MCH 30.1 2022    MCHC 32.9 2022    MCHC 32.0 2022    RDW 13.1 2022    RDW 13.3 2022     2022     2022    MPV 8.6 (L) 2022    MPV 9.3 2022    GRAN 3.4 2022    GRAN 53.2 2022    LYMPH 2.2 2022    LYMPH 33.8  04/01/2022    MONO 0.6 04/01/2022    MONO 9.7 04/01/2022    BASO 0.02 04/01/2022    BASO 0.02 02/17/2022    NRBC 0 04/01/2022    NRBC 0 02/17/2022       CHEMISTRY & LIVER FUNCTION - LAST 2  Lab Results   Component Value Date     04/01/2022     02/17/2022    K 4.5 04/01/2022    K 4.5 02/17/2022     04/01/2022     02/17/2022    CO2 26 04/01/2022    CO2 24 02/17/2022    ANIONGAP 9 04/01/2022    ANIONGAP 10 02/17/2022    BUN 15 04/01/2022    BUN 14 02/17/2022    CREATININE 0.8 04/01/2022    CREATININE 0.6 02/17/2022     04/01/2022     (H) 02/17/2022    CALCIUM 9.2 04/01/2022    CALCIUM 8.9 02/17/2022    ALBUMIN 4.1 01/27/2022    ALBUMIN 3.9 07/07/2021    PROT 7.2 01/27/2022    PROT 7.0 07/07/2021    ALKPHOS 72 01/27/2022    ALKPHOS 62 07/07/2021    ALT 27 01/27/2022    ALT 34 07/07/2021    AST 22 01/27/2022    AST 20 07/07/2021    BILITOT 0.8 01/27/2022    BILITOT 0.4 07/07/2021        CARDIAC PROFILE - LAST 2  No results found for: BNP, CPK, CPKMB, LDH, TROPONINI, TROPONINIHS     COAGULATION - LAST 2  Lab Results   Component Value Date    LABPT 14.3 (H) 01/27/2022    INR 1.0 04/01/2022    INR 1.2 01/27/2022    APTT 29.6 04/01/2022       ENDOCRINE & PSA - LAST 2  Lab Results   Component Value Date    TSH 1.183 12/23/2016    TSH 0.674 09/03/2014    PSA 3.4 07/07/2021    PSA 2.4 03/01/2018        ECHOCARDIOGRAM RESULTS  Results for orders placed during the hospital encounter of 04/05/22    Transesophageal echo (GEOVANNI)    Interpretation Summary  · The left ventricle is normal in size with mildly decreased systolic function. The estimated ejection fraction is 45%.  · There is mild left ventricular global hypokinesis.  · Normal right ventricular size with normal right ventricular systolic function.  · Biatrial enlargement.  · Normal appearing left atrial appendage. No thrombus is present in the appendage.      CURRENT/PREVIOUS VISIT EKG  Results for orders placed or performed in visit on  03/22/23   IN OFFICE EKG 12-LEAD (to [facility]WOR[facility])    Collection Time: 03/22/23  9:15 AM    Narrative    Test Reason : I48.91,    Vent. Rate : 073 BPM     Atrial Rate : 073 BPM     P-R Int : 164 ms          QRS Dur : 086 ms      QT Int : 404 ms       P-R-T Axes : 057 055 032 degrees     QTc Int : 445 ms    Normal sinus rhythm  Normal ECG  When compared with ECG of 06-JUL-2022 10:16,  No significant change was found    Referred By:  GP           Confirmed By:      No valid procedures specified.   Results for orders placed in visit on 01/24/22    Nuclear Stress Test    Interpretation Summary    The EKG portion of this study is negative for ischemia.    The patient reported no chest pain during the stress test.    During stress, atrial fibrillation is noted.    The nuclear portion of this study will be reported separately.    No valid procedures specified.    PHYSICAL EXAM  CONSTITUTIONAL: Obese, Well built, well nourished in no apparent distress  NECK: no carotid bruit, no JVD  LUNGS: CTA  CHEST WALL: no tenderness  HEART: regular rate and rhythm, S1, S2 normal, no murmur, click, rub or gallop   ABDOMEN: soft, obese non-tender; bowel sounds normal  EXTREMITIES: Extremities normal, no edema, no calf tenderness noted  NEURO: AAO X 3    I HAVE REVIEWED :    The vital signs, nurses notes, and all the pertinent radiology and labs.        Current Outpatient Medications   Medication Instructions    amLODIPine (NORVASC) 10 MG tablet TAKE 1 TABLET BY MOUTH IN  THE EVENING    benazepriL (LOTENSIN) 40 MG tablet TAKE 1 TABLET BY MOUTH ONCE DAILY IN THE EVENING    fluticasone propionate (FLONASE) 100 mcg, Each Nostril, Daily    metoprolol succinate (TOPROL-XL) 50 MG 24 hr tablet TAKE 1 TABLET BY MOUTH  DAILY    sotaloL (BETAPACE) 80 mg, Oral, 2 times daily    terazosin (HYTRIN) 1 mg, Oral, Nightly    triamterene-hydrochlorothiazide 37.5-25 mg (DYAZIDE) 37.5-25 mg per capsule 1 capsule, Oral, Every other day          Assessment & Plan      Hypertension  Continue amlodipine 10 mg daily, benazepril 40 mg daily, triamterene and hydrochlorothiazide 37.525 mg daily.  We will start on terazosin 1 mg nightly.  Monitor blood pressure 3 times daily for 1 week.  Notify if SBP remains greater than 140.     Recommend weight loss and low-sodium heart healthy diet.  Patient is also due for routine labs.  Ordered CBC, BMP, Mag and lipid panel today in office.    Atrial fibrillation, new onset  Patient had recent EKG on 03/23/23.  Normal sinus rhythm.  Patient was in regular rhythm today in office.  Continue sotalol 80 mg b.i.d..    Severe obesity with body mass index (BMI) of 35.0 to 39.9 with comorbidity  Encourage weight loss.  Low-sodium heart healthy diet.  Reduce saturated fats.    Hyperlipidemia  Patient has not had a recent lipid panel on file.  Patient to to have lipid panel performed when fasting.  Recommend low-sodium heart healthy diet.          Follow up in about 3 months (around 6/27/2023).

## 2023-03-27 NOTE — ASSESSMENT & PLAN NOTE
Continue amlodipine 10 mg daily, benazepril 40 mg daily, triamterene and hydrochlorothiazide 37.525 mg daily.  We will start on terazosin 1 mg nightly.  Monitor blood pressure 3 times daily for 1 week.  Notify if SBP remains greater than 140.     Recommend weight loss and low-sodium heart healthy diet.  Patient is also due for routine labs.  Ordered CBC, BMP, Mag and lipid panel today in office.

## 2023-03-27 NOTE — ASSESSMENT & PLAN NOTE
Patient had recent EKG on 03/23/23.  Normal sinus rhythm.  Patient was in regular rhythm today in office.  Continue sotalol 80 mg b.i.d..

## 2023-03-27 NOTE — ASSESSMENT & PLAN NOTE
Patient has not had a recent lipid panel on file.  Patient to to have lipid panel performed when fasting.  Recommend low-sodium heart healthy diet.

## 2023-05-01 ENCOUNTER — TELEPHONE (OUTPATIENT)
Dept: ELECTROPHYSIOLOGY | Facility: CLINIC | Age: 63
End: 2023-05-01
Payer: COMMERCIAL

## 2023-05-01 NOTE — TELEPHONE ENCOUNTER
Returned call to [facility] to confirm sotalol and metoprolol are token together.      ----- Message from Alexandr Earl MA sent at 5/1/2023  3:37 PM CDT -----  Regarding: FW: Pharmacy  Contact: Tera with [facility]  Good afternoon,     Please see below.     Thanks,  Alexandr Earl MA     ----- Message -----  From: Andres York  Sent: 5/1/2023   1:15 PM CDT  To: [facility] Staff  Subject: Pharmacy                                         Type:  Pharmacy Calling to Clarify an RX    Name of Caller:ROXY/ Tera LOPEZ  Pharmacy Name:[facility]  Prescription Name:sotaloL (BETAPACE) 80 MG tablet  What do they need to clarify?:clarification  Best Call Back Number:[phone]  Additional Information: Please call to clarify.

## 2023-06-19 ENCOUNTER — OFFICE VISIT (OUTPATIENT)
Dept: CARDIOLOGY | Facility: CLINIC | Age: 63
End: 2023-06-19
Payer: COMMERCIAL

## 2023-06-19 VITALS
WEIGHT: 233 LBS | DIASTOLIC BLOOD PRESSURE: 80 MMHG | BODY MASS INDEX: 37.45 KG/M2 | HEIGHT: 66 IN | HEART RATE: 75 BPM | SYSTOLIC BLOOD PRESSURE: 130 MMHG | OXYGEN SATURATION: 94 %

## 2023-06-19 DIAGNOSIS — J30.2 SEASONAL ALLERGIC RHINITIS, UNSPECIFIED TRIGGER: ICD-10-CM

## 2023-06-19 DIAGNOSIS — I10 ESSENTIAL HYPERTENSION: ICD-10-CM

## 2023-06-19 DIAGNOSIS — I10 PRIMARY HYPERTENSION: ICD-10-CM

## 2023-06-19 DIAGNOSIS — J06.9 UPPER RESPIRATORY TRACT INFECTION, UNSPECIFIED TYPE: ICD-10-CM

## 2023-06-19 DIAGNOSIS — R00.0 TACHYCARDIA: ICD-10-CM

## 2023-06-19 DIAGNOSIS — E78.2 MIXED HYPERLIPIDEMIA: ICD-10-CM

## 2023-06-19 DIAGNOSIS — I48.91 ATRIAL FIBRILLATION, NEW ONSET: Primary | ICD-10-CM

## 2023-06-19 DIAGNOSIS — E66.01 SEVERE OBESITY WITH BODY MASS INDEX (BMI) OF 35.0 TO 39.9 WITH COMORBIDITY: ICD-10-CM

## 2023-06-19 PROCEDURE — 3075F SYST BP GE 130 - 139MM HG: CPT | Mod: CPTII,S$GLB,,

## 2023-06-19 PROCEDURE — 4010F ACE/ARB THERAPY RXD/TAKEN: CPT | Mod: CPTII,S$GLB,,

## 2023-06-19 PROCEDURE — 1160F RVW MEDS BY RX/DR IN RCRD: CPT | Mod: CPTII,S$GLB,,

## 2023-06-19 PROCEDURE — 4010F PR ACE/ARB THEARPY RXD/TAKEN: ICD-10-PCS | Mod: CPTII,S$GLB,,

## 2023-06-19 PROCEDURE — 1159F PR MEDICATION LIST DOCUMENTED IN MEDICAL RECORD: ICD-10-PCS | Mod: CPTII,S$GLB,,

## 2023-06-19 PROCEDURE — 3079F PR MOST RECENT DIASTOLIC BLOOD PRESSURE 80-89 MM HG: ICD-10-PCS | Mod: CPTII,S$GLB,,

## 2023-06-19 PROCEDURE — 3079F DIAST BP 80-89 MM HG: CPT | Mod: CPTII,S$GLB,,

## 2023-06-19 PROCEDURE — 3008F PR BODY MASS INDEX (BMI) DOCUMENTED: ICD-10-PCS | Mod: CPTII,S$GLB,,

## 2023-06-19 PROCEDURE — 99214 OFFICE O/P EST MOD 30 MIN: CPT | Mod: S$GLB,,,

## 2023-06-19 PROCEDURE — 99999 PR PBB SHADOW E&M-EST. PATIENT-LVL III: ICD-10-PCS | Mod: PBBFAC,,,

## 2023-06-19 PROCEDURE — 3075F PR MOST RECENT SYSTOLIC BLOOD PRESS GE 130-139MM HG: ICD-10-PCS | Mod: CPTII,S$GLB,,

## 2023-06-19 PROCEDURE — 99214 PR OFFICE/OUTPT VISIT, EST, LEVL IV, 30-39 MIN: ICD-10-PCS | Mod: S$GLB,,,

## 2023-06-19 PROCEDURE — 3008F BODY MASS INDEX DOCD: CPT | Mod: CPTII,S$GLB,,

## 2023-06-19 PROCEDURE — 1159F MED LIST DOCD IN RCRD: CPT | Mod: CPTII,S$GLB,,

## 2023-06-19 PROCEDURE — 99999 PR PBB SHADOW E&M-EST. PATIENT-LVL III: CPT | Mod: PBBFAC,,,

## 2023-06-19 PROCEDURE — 1160F PR REVIEW ALL MEDS BY PRESCRIBER/CLIN PHARMACIST DOCUMENTED: ICD-10-PCS | Mod: CPTII,S$GLB,,

## 2023-06-19 RX ORDER — SOTALOL HYDROCHLORIDE 80 MG/1
80 TABLET ORAL 2 TIMES DAILY
Qty: 180 TABLET | Refills: 3 | Status: SHIPPED | OUTPATIENT
Start: 2023-06-19 | End: 2024-06-18

## 2023-06-19 RX ORDER — FLUTICASONE PROPIONATE 50 MCG
2 SPRAY, SUSPENSION (ML) NASAL DAILY
Qty: 16 G | Refills: 5 | Status: SHIPPED | OUTPATIENT
Start: 2023-06-19

## 2023-06-19 RX ORDER — DOXYCYCLINE HYCLATE 100 MG
100 TABLET ORAL 2 TIMES DAILY
Qty: 14 TABLET | Refills: 0 | Status: SHIPPED | OUTPATIENT
Start: 2023-06-19 | End: 2023-06-26

## 2023-06-19 RX ORDER — BENAZEPRIL HYDROCHLORIDE 40 MG/1
40 TABLET ORAL NIGHTLY
Qty: 90 TABLET | Refills: 3 | Status: SHIPPED | OUTPATIENT
Start: 2023-06-19

## 2023-06-19 RX ORDER — AZITHROMYCIN 250 MG/1
TABLET, FILM COATED ORAL
Qty: 6 TABLET | Refills: 0 | Status: SHIPPED | OUTPATIENT
Start: 2023-06-19 | End: 2023-06-19

## 2023-06-19 RX ORDER — TRIAMTERENE AND HYDROCHLOROTHIAZIDE 37.5; 25 MG/1; MG/1
1 CAPSULE ORAL EVERY OTHER DAY
Qty: 45 CAPSULE | Refills: 3 | Status: SHIPPED | OUTPATIENT
Start: 2023-06-19 | End: 2024-06-18

## 2023-06-19 RX ORDER — AMLODIPINE BESYLATE 10 MG/1
10 TABLET ORAL NIGHTLY
Qty: 90 TABLET | Refills: 3 | Status: SHIPPED | OUTPATIENT
Start: 2023-06-19

## 2023-06-19 NOTE — ASSESSMENT & PLAN NOTE
Continue amlodipine 10 mg daily, benazepril 40 mg daily, and metoprolol succinate 50 mg daily.  Continue sotalol 80 mg BID.  Started on Terazosin last visit.  Continue the same.  BP stable.  130/80 today in office. Continue low sodium heart healthy diet.

## 2023-06-19 NOTE — PROGRESS NOTES
Subjective:    Patient ID:  Rich Ballard is a 62 y.o. male patient here for evaluation Follow-up      History of Present Illness:       Patient is here for a follow-up.  He denies CP, shortness of breath, lightheadedness, dizziness, jaw/neck/arm pain, edema, and bleeding.  BP stable since starting Hytrin 1 mg daily.  Patient is planning to get fasting labs next am.  He has had a 3 lbs weight loss since last visit.   Patient has had a cough for the past few weeks with yellow green thick phlegm. No recent antibiotics.  Denies fever, chills, facial tenderness.  Mild puffiness in frontal sinus area.        Review of patient's allergies indicates:   Allergen Reactions    Amiodarone analogues Shortness Of Breath       Past Medical History:   Diagnosis Date    Allergy     Atrial fibrillation     Hyperlipidemia     Hypertension      Past Surgical History:   Procedure Laterality Date    ABLATION OF ARRHYTHMOGENIC FOCUS FOR ATRIAL FIBRILLATION N/A 4/5/2022    Procedure: ABLATION, ARRHYTHMOGENIC FOCUS, FOR ATRIAL FIBRILLATION;  Surgeon: Niko Casarez MD;  Location: [facility] EP LAB;  Service: Cardiology;  Laterality: N/A;  AF, GEOVANNI(Cx if SR), PVI, RFA, CARTO, GEN, GP, 3 PREP    APPENDECTOMY      CERVICAL FUSION      COLONOSCOPY N/A 2/1/2017    Procedure: COLONOSCOPY;  Surgeon: Kennedy Soto MD;  Location: [facility] ENDO;  Service: Endoscopy;  Laterality: N/A;    ppp      ROTATOR CUFF REPAIR      TRANSESOPHAGEAL ECHOCARDIOGRAPHY N/A 4/5/2022    Procedure: ECHOCARDIOGRAM, TRANSESOPHAGEAL;  Surgeon: Tray Leal MD;  Location: [facility] EP LAB;  Service: Cardiology;  Laterality: N/A;    TREATMENT OF CARDIAC ARRHYTHMIA N/A 2/17/2022    Procedure: CARDIOVERSION;  Surgeon: Kaleb Santana MD;  Location: [facility] CATH/EP LAB;  Service: Cardiology;  Laterality: N/A;    TREATMENT OF CARDIAC ARRHYTHMIA  4/5/2022    Procedure: Cardioversion or Defibrillation;  Surgeon: Niko Casarez MD;  Location: [facility] EP LAB;  Service: Cardiology;;    tumor  removal from scapula       Social History     Tobacco Use    Smoking status: Former     Packs/day: 0.50     Years: 30.00     Pack years: 15.00     Types: Cigarettes     Start date: 10/16/1989     Quit date: 3/1/2022     Years since quittin.3    Smokeless tobacco: Never   Substance Use Topics    Alcohol use: No     Comment: very rarely    Drug use: No        Review of Systems:    As noted in HPI in addition      REVIEW OF SYSTEMS  CARDIOVASCULAR: No recent chest pain, palpitations, arm, neck, or jaw pain  RESPIRATORY: No recent fever, chills, SOB or congestion  + cough and green phlegm  : No blood in the urine  GI: No Nausea, vomiting, constipation, diarrhea, blood, or reflux.  MUSCULOSKELETAL: No myalgias  NEURO: No lightheadedness or dizziness  EYES: No Double vision, blurry, vision or headache              Objective        Vitals:    23 0844   BP: 130/80   Pulse: 75       LIPIDS - LAST 2   Lab Results   Component Value Date    CHOL 217 (H) 2021    CHOL 212 (H) 2018    HDL 39 (L) 2021    HDL 31 (L) 2018    LDLCALC 156.0 2021    LDLCALC 146.6 2018    TRIG 110 2021    TRIG 172 (H) 2018    CHOLHDL 18.0 (L) 2021    CHOLHDL 14.6 (L) 2018       CBC - LAST 2  Lab Results   Component Value Date    WBC 6.42 2022    WBC 5.49 2022    RBC 5.05 2022    RBC 5.09 2022    HGB 15.6 2022    HGB 15.3 2022    HCT 47.4 2022    HCT 47.8 2022    MCV 94 2022    MCV 94 2022    MCH 30.9 2022    MCH 30.1 2022    MCHC 32.9 2022    MCHC 32.0 2022    RDW 13.1 2022    RDW 13.3 2022     2022     2022    MPV 8.6 (L) 2022    MPV 9.3 2022    GRAN 3.4 2022    GRAN 53.2 2022    LYMPH 2.2 2022    LYMPH 33.8 2022    MONO 0.6 2022    MONO 9.7 2022    BASO 0.02 2022    BASO 0.02 2022    NRBC 0 2022     NRBC 0 02/17/2022       CHEMISTRY & LIVER FUNCTION - LAST 2  Lab Results   Component Value Date     04/01/2022     02/17/2022    K 4.5 04/01/2022    K 4.5 02/17/2022     04/01/2022     02/17/2022    CO2 26 04/01/2022    CO2 24 02/17/2022    ANIONGAP 9 04/01/2022    ANIONGAP 10 02/17/2022    BUN 15 04/01/2022    BUN 14 02/17/2022    CREATININE 0.8 04/01/2022    CREATININE 0.6 02/17/2022     04/01/2022     (H) 02/17/2022    CALCIUM 9.2 04/01/2022    CALCIUM 8.9 02/17/2022    ALBUMIN 4.1 01/27/2022    ALBUMIN 3.9 07/07/2021    PROT 7.2 01/27/2022    PROT 7.0 07/07/2021    ALKPHOS 72 01/27/2022    ALKPHOS 62 07/07/2021    ALT 27 01/27/2022    ALT 34 07/07/2021    AST 22 01/27/2022    AST 20 07/07/2021    BILITOT 0.8 01/27/2022    BILITOT 0.4 07/07/2021        CARDIAC PROFILE - LAST 2  No results found for: BNP, CPK, CPKMB, LDH, TROPONINI, TROPONINIHS     COAGULATION - LAST 2  Lab Results   Component Value Date    LABPT 14.3 (H) 01/27/2022    INR 1.0 04/01/2022    INR 1.2 01/27/2022    APTT 29.6 04/01/2022       ENDOCRINE & PSA - LAST 2  Lab Results   Component Value Date    TSH 1.183 12/23/2016    TSH 0.674 09/03/2014    PSA 3.4 07/07/2021    PSA 2.4 03/01/2018        ECHOCARDIOGRAM RESULTS  Results for orders placed during the hospital encounter of 04/05/22    Transesophageal echo (GEOVANNI)    Interpretation Summary  · The left ventricle is normal in size with mildly decreased systolic function. The estimated ejection fraction is 45%.  · There is mild left ventricular global hypokinesis.  · Normal right ventricular size with normal right ventricular systolic function.  · Biatrial enlargement.  · Normal appearing left atrial appendage. No thrombus is present in the appendage.      CURRENT/PREVIOUS VISIT EKG  Results for orders placed or performed in visit on 03/22/23   IN OFFICE EKG 12-LEAD (to [place])    Collection Time: 03/22/23  9:15 AM    Narrative    Test Reason : I48.91,    Vent.  Rate : 073 BPM     Atrial Rate : 073 BPM     P-R Int : 164 ms          QRS Dur : 086 ms      QT Int : 404 ms       P-R-T Axes : 057 055 032 degrees     QTc Int : 445 ms    Normal sinus rhythm  Normal ECG  When compared with ECG of 06-JUL-2022 10:16,  No significant change was found  Confirmed by Cy Lombardi MD ([id number]) on 3/29/2023 9:18:10 PM    Referred By:  GP           Confirmed By:Cy Lombardi MD     No valid procedures specified.   Results for orders placed in visit on 01/24/22    Nuclear Stress Test    Interpretation Summary    The EKG portion of this study is negative for ischemia.    The patient reported no chest pain during the stress test.    During stress, atrial fibrillation is noted.    The nuclear portion of this study will be reported separately.    No valid procedures specified.    PHYSICAL EXAM  CONSTITUTIONAL: Well built, well nourished in no apparent distress  NECK: no carotid bruit, no JVD  LUNGS: CTA  CHEST WALL: no tenderness  HEART: regular rate and rhythm, S1, S2 normal,  ABDOMEN: soft, non-tender; bowel sounds normal  EXTREMITIES: Extremities normal, no edema, no calf tenderness noted  NEURO: AAO X 3    I HAVE REVIEWED :    The vital signs, nurses notes, and all the pertinent radiology and labs.        Current Outpatient Medications   Medication Instructions    amLODIPine (NORVASC) 10 mg, Oral, Nightly    benazepriL (LOTENSIN) 40 mg, Oral, Nightly    doxycycline (VIBRA-TABS) 100 mg, Oral, 2 times daily    fluticasone propionate (FLONASE) 100 mcg, Each Nostril, Daily    metoprolol succinate (TOPROL-XL) 50 MG 24 hr tablet TAKE 1 TABLET BY MOUTH  DAILY    sotaloL (BETAPACE) 80 mg, Oral, 2 times daily    terazosin (HYTRIN) 1 mg, Oral, Nightly    triamterene-hydrochlorothiazide 37.5-25 mg (DYAZIDE) 37.5-25 mg per capsule 1 capsule, Oral, Every other day          Assessment & Plan     Hypertension  Continue amlodipine 10 mg daily, benazepril 40 mg daily, and metoprolol succinate 50 mg daily.   Continue sotalol 80 mg BID.  Started on Terazosin last visit.  Continue the same.  BP stable.  130/80 today in office. Continue low sodium heart healthy diet.      Hyperlipidemia  Patient is due for updated lipid panel.  Recommend low sodium heart healthy diet.      Atrial fibrillation, new onset  RRR today in office. Continue sotalol 80 mg BID. Continue metoprolol 50 mg daily.  Hold for SBP <100 and HR <60.     Tachycardia  No recent tachycardia.  Continue current medication regimen.     Severe obesity with body mass index (BMI) of 35.0 to 39.9 with comorbidity  3 lbs weight loss since last visit.  Increase activity as tolerated.   Recommend weight loss.     Upper respiratory tract infection  Doxycycline 100mg BID x 7days.  Did not do Z-bam due to QTC prolongation risk with sotalol.            Follow up in about 3 months (around 9/19/2023).

## 2023-06-19 NOTE — ASSESSMENT & PLAN NOTE
RRR today in office. Continue sotalol 80 mg BID. Continue metoprolol 50 mg daily.  Hold for SBP <100 and HR <60.

## 2023-06-20 ENCOUNTER — LAB VISIT (OUTPATIENT)
Dept: LAB | Facility: HOSPITAL | Age: 63
End: 2023-06-20
Payer: COMMERCIAL

## 2023-06-20 DIAGNOSIS — I10 ESSENTIAL HYPERTENSION: ICD-10-CM

## 2023-06-20 LAB
ANION GAP SERPL CALC-SCNC: 9 MMOL/L (ref 8–16)
BASOPHILS # BLD AUTO: 0.02 K/UL (ref 0–0.2)
BASOPHILS NFR BLD: 0.3 % (ref 0–1.9)
BUN SERPL-MCNC: 14 MG/DL (ref 8–23)
CALCIUM SERPL-MCNC: 9.4 MG/DL (ref 8.7–10.5)
CHLORIDE SERPL-SCNC: 103 MMOL/L (ref 95–110)
CHOLEST SERPL-MCNC: 202 MG/DL (ref 120–199)
CHOLEST/HDLC SERPL: 5.5 {RATIO} (ref 2–5)
CO2 SERPL-SCNC: 26 MMOL/L (ref 23–29)
CREAT SERPL-MCNC: 0.8 MG/DL (ref 0.5–1.4)
DIFFERENTIAL METHOD: NORMAL
EOSINOPHIL # BLD AUTO: 0.1 K/UL (ref 0–0.5)
EOSINOPHIL NFR BLD: 2.1 % (ref 0–8)
ERYTHROCYTE [DISTWIDTH] IN BLOOD BY AUTOMATED COUNT: 12.3 % (ref 11.5–14.5)
EST. GFR  (NO RACE VARIABLE): >60 ML/MIN/1.73 M^2
GLUCOSE SERPL-MCNC: 98 MG/DL (ref 70–110)
HCT VFR BLD AUTO: 47.4 % (ref 40–54)
HDLC SERPL-MCNC: 37 MG/DL (ref 40–75)
HDLC SERPL: 18.3 % (ref 20–50)
HGB BLD-MCNC: 15.3 G/DL (ref 14–18)
IMM GRANULOCYTES # BLD AUTO: 0.01 K/UL (ref 0–0.04)
IMM GRANULOCYTES NFR BLD AUTO: 0.1 % (ref 0–0.5)
LDLC SERPL CALC-MCNC: 137.4 MG/DL (ref 63–159)
LYMPHOCYTES # BLD AUTO: 2.8 K/UL (ref 1–4.8)
LYMPHOCYTES NFR BLD: 42.4 % (ref 18–48)
MAGNESIUM SERPL-MCNC: 1.7 MG/DL (ref 1.6–2.6)
MCH RBC QN AUTO: 30.7 PG (ref 27–31)
MCHC RBC AUTO-ENTMCNC: 32.3 G/DL (ref 32–36)
MCV RBC AUTO: 95 FL (ref 82–98)
MONOCYTES # BLD AUTO: 0.5 K/UL (ref 0.3–1)
MONOCYTES NFR BLD: 7.3 % (ref 4–15)
NEUTROPHILS # BLD AUTO: 3.2 K/UL (ref 1.8–7.7)
NEUTROPHILS NFR BLD: 47.8 % (ref 38–73)
NONHDLC SERPL-MCNC: 165 MG/DL
NRBC BLD-RTO: 0 /100 WBC
PLATELET # BLD AUTO: 268 K/UL (ref 150–450)
PMV BLD AUTO: 9.3 FL (ref 9.2–12.9)
POTASSIUM SERPL-SCNC: 4.7 MMOL/L (ref 3.5–5.1)
RBC # BLD AUTO: 4.99 M/UL (ref 4.6–6.2)
SODIUM SERPL-SCNC: 138 MMOL/L (ref 136–145)
TRIGL SERPL-MCNC: 138 MG/DL (ref 30–150)
WBC # BLD AUTO: 6.68 K/UL (ref 3.9–12.7)

## 2023-06-20 PROCEDURE — 83735 ASSAY OF MAGNESIUM: CPT

## 2023-06-20 PROCEDURE — 36415 COLL VENOUS BLD VENIPUNCTURE: CPT | Mod: PO

## 2023-06-20 PROCEDURE — 80061 LIPID PANEL: CPT

## 2023-06-20 PROCEDURE — 85025 COMPLETE CBC W/AUTO DIFF WBC: CPT

## 2023-06-20 PROCEDURE — 80048 BASIC METABOLIC PNL TOTAL CA: CPT

## 2023-06-21 RX ORDER — ROSUVASTATIN CALCIUM 5 MG/1
5 TABLET, COATED ORAL DAILY
Qty: 90 TABLET | Refills: 3 | Status: SHIPPED | OUTPATIENT
Start: 2023-06-21 | End: 2024-06-20

## 2023-08-18 ENCOUNTER — OFFICE VISIT (OUTPATIENT)
Dept: DERMATOLOGY | Facility: CLINIC | Age: 63
End: 2023-08-18
Payer: COMMERCIAL

## 2023-08-18 DIAGNOSIS — L82.1 SEBORRHEIC KERATOSIS: Primary | ICD-10-CM

## 2023-08-18 PROCEDURE — 1159F MED LIST DOCD IN RCRD: CPT | Mod: CPTII,95,, | Performed by: STUDENT IN AN ORGANIZED HEALTH CARE EDUCATION/TRAINING PROGRAM

## 2023-08-18 PROCEDURE — 4010F ACE/ARB THERAPY RXD/TAKEN: CPT | Mod: CPTII,95,, | Performed by: STUDENT IN AN ORGANIZED HEALTH CARE EDUCATION/TRAINING PROGRAM

## 2023-08-18 PROCEDURE — 99202 PR OFFICE/OUTPT VISIT, NEW, LEVL II, 15-29 MIN: ICD-10-PCS | Mod: 95,,, | Performed by: STUDENT IN AN ORGANIZED HEALTH CARE EDUCATION/TRAINING PROGRAM

## 2023-08-18 PROCEDURE — 1160F RVW MEDS BY RX/DR IN RCRD: CPT | Mod: CPTII,95,, | Performed by: STUDENT IN AN ORGANIZED HEALTH CARE EDUCATION/TRAINING PROGRAM

## 2023-08-18 PROCEDURE — 4010F PR ACE/ARB THEARPY RXD/TAKEN: ICD-10-PCS | Mod: CPTII,95,, | Performed by: STUDENT IN AN ORGANIZED HEALTH CARE EDUCATION/TRAINING PROGRAM

## 2023-08-18 PROCEDURE — 99202 OFFICE O/P NEW SF 15 MIN: CPT | Mod: 95,,, | Performed by: STUDENT IN AN ORGANIZED HEALTH CARE EDUCATION/TRAINING PROGRAM

## 2023-08-18 PROCEDURE — 1160F PR REVIEW ALL MEDS BY PRESCRIBER/CLIN PHARMACIST DOCUMENTED: ICD-10-PCS | Mod: CPTII,95,, | Performed by: STUDENT IN AN ORGANIZED HEALTH CARE EDUCATION/TRAINING PROGRAM

## 2023-08-18 PROCEDURE — 1159F PR MEDICATION LIST DOCUMENTED IN MEDICAL RECORD: ICD-10-PCS | Mod: CPTII,95,, | Performed by: STUDENT IN AN ORGANIZED HEALTH CARE EDUCATION/TRAINING PROGRAM

## 2023-08-18 NOTE — PROGRESS NOTES
Patient Information  Name: Rich Ballard  : 1960  MRN: [id number]     Referring Physician:  Dr. Mabry ref. provider found   Primary Care Physician:  Dr. Gautam, Piotr Paris MD   Date of Visit: 2023      Subjective:       Rich Ballard is a 63 y.o. male who presents for skin lesion    HPI  Patient with new complaint of lesion(s)  Location: left hand  Duration: 2 months  Symptoms: none  Relieving factors/Previous treatments: none    Patient was last seen in Dermatology: Visit date not found.    Prior notes by myself reviewed.   Clinical documentation obtained by nursing staff reviewed.    Review of Systems   Skin:  Negative for itching and rash.        Objective:    Physical Exam   Constitutional: He appears well-developed and well-nourished. No distress.   Neurological: He is alert and oriented to person, place, and time. He is not disoriented.   Psychiatric: He has a normal mood and affect.   Skin:   Areas Examined (abnormalities noted in diagram):   LUE Inspection Performed             Diagram Legend     Erythematous scaling macule/papule c/w actinic keratosis       Vascular papule c/w angioma      Pigmented verrucoid papule/plaque c/w seborrheic keratosis      Yellow umbilicated papule c/w sebaceous hyperplasia      Irregularly shaped tan macule c/w lentigo     1-2 mm smooth white papules consistent with Milia      Movable subcutaneous cyst with punctum c/w epidermal inclusion cyst      Subcutaneous movable cyst c/w pilar cyst      Firm pink to brown papule c/w dermatofibroma      Pedunculated fleshy papule(s) c/w skin tag(s)      Evenly pigmented macule c/w junctional nevus     Mildly variegated pigmented, slightly irregular-bordered macule c/w mildly atypical nevus      Flesh colored to evenly pigmented papule c/w intradermal nevus       Pink pearly papule/plaque c/w basal cell carcinoma      Erythematous hyperkeratotic cursted plaque c/w SCC      Surgical scar with no sign of skin cancer  recurrence      Open and closed comedones      Inflammatory papules and pustules      Verrucoid papule consistent consistent with wart     Erythematous eczematous patches and plaques     Dystrophic onycholytic nail with subungual debris c/w onychomycosis     Umbilicated papule    Erythematous-base heme-crusted tan verrucoid plaque consistent with inflamed seborrheic keratosis     Erythematous Silvery Scaling Plaque c/w Psoriasis     See annotation          [] Data reviewed    [] Prior external notes reviewed    [] Independent review of test    [] Management discussed with another provider    [] Independent historian    Assessment / Plan:        Seborrheic keratosis  These are benign inherited growths without a malignant potential. Reassurance given to patient. No treatment is necessary.            The patient location is: [place], [place]  The chief complaint leading to consultation is: skin lesion    Visit type: audiovisual    Face to Face time with patient: 6 minutes  9 minutes of total time spent on the encounter, which includes face to face time and non-face to face time preparing to see the patient (eg, review of tests), Obtaining and/or reviewing separately obtained history, Documenting clinical information in the electronic or other health record, Independently interpreting results (not separately reported) and communicating results to the patient/family/caregiver, or Care coordination (not separately reported).         Each patient to whom he or she provides medical services by telemedicine is:  (1) informed of the relationship between the physician and patient and the respective role of any other health care provider with respect to management of the patient; and (2) notified that he or she may decline to receive medical services by telemedicine and may withdraw from such care at any time.          LOS NUMBER AND COMPLEXITY OF PROBLEMS    COMPLEXITY OF DATA RISK TOTAL TIME (m)   30192  03643 [] 1 self-limited or  minor problem [x] Minimal to none [x] No treatment recommended or patient to monitor. Reassurance.  15-29  10-19   12681  [id number] Low  [] 2 or more self limited or minor problems  [x] 1 stable chronic illness  [] 1 acute, uncomplicated illness or injury Limited (2)  [] Prior external notes from each unique source  [] Review result of each unique test  [] Order each unique test  OR [] Independent historian Low  []  OTC medications   []  Discussed/Decision for minor skin surgery (no risk factors) 30-44  20-29   05405  66848 Moderate  []  1 or more chronic unstable illness (not at goal or progression or exacerbation) or SE of treatment  []  2 or more stable chronic illnesses  []  1 acute illness with systemic symptoms  []  1 acute complicated injury  []  1 undiagnosed new problem with uncertain prognosis Moderate (1/3 below)  []  3 or more data items        *Now includes independent historian  []  Independent interpretation of a test  []  Discuss management/test with another provider Moderate  []  Prescription drug mgmt  []  Discussed/Decision for Minor surgery with risk factors  []  Mgmt limited by social determinates 45-59  30-39   19988  03017 High  []  1 or more chronic illness with severe exacerbation, progression or SE of treatment  []  1 acute or chronic illness/injury that poses a threat to life or bodily function Extensive (2/3 below)  []  3 or more data items        *Now includes independent historian.  []  Independent interpretation of a test  []  Discuss management/test with another provider High  []  Major surgery with risk discussed  []  Drug therapy requiring intensive monitoring for toxicity  []  Hospitalization  []  Decision for DNR 60-74  40-54

## 2023-09-29 DIAGNOSIS — I48.91 ATRIAL FIBRILLATION, NEW ONSET: ICD-10-CM

## 2023-09-29 RX ORDER — METOPROLOL SUCCINATE 50 MG/1
50 TABLET, EXTENDED RELEASE ORAL
Qty: 90 TABLET | Refills: 1 | Status: SHIPPED | OUTPATIENT
Start: 2023-09-29

## 2023-10-09 ENCOUNTER — TELEPHONE (OUTPATIENT)
Dept: PULMONOLOGY | Facility: CLINIC | Age: 63
End: 2023-10-09

## 2023-10-09 NOTE — TELEPHONE ENCOUNTER
Spoke with pts wife and told her he could bring his old machine to a [employer] company to get checked out but he would have to pay out of pocket for it since he owns machine.  Or he can schedule an apt with us and start the process of getting him a new machine thru his insurance.  Wife said she will talk to pt and get back with me about what pt wants to do.

## 2023-10-09 NOTE — TELEPHONE ENCOUNTER
Pts wife called and said pts old cpap machine is making a weird humming noise now.  He has had machine for over 5 years.  Pts not sure if machine is working properly but its def making some weird noises.   He had a new HST 5/2022 but I do not see where a new machine or anything was to be done after.  Wife wants to know if he can get a new machine or do they just need someone to come check his old machine and see whats wrong with it

## 2023-10-23 ENCOUNTER — OFFICE VISIT (OUTPATIENT)
Dept: PULMONOLOGY | Facility: CLINIC | Age: 63
End: 2023-10-23
Payer: COMMERCIAL

## 2023-10-23 VITALS
DIASTOLIC BLOOD PRESSURE: 64 MMHG | BODY MASS INDEX: 37.41 KG/M2 | HEART RATE: 78 BPM | SYSTOLIC BLOOD PRESSURE: 128 MMHG | WEIGHT: 231.81 LBS | OXYGEN SATURATION: 92 %

## 2023-10-23 DIAGNOSIS — G47.33 OSA (OBSTRUCTIVE SLEEP APNEA): Primary | ICD-10-CM

## 2023-10-23 DIAGNOSIS — R53.83 FATIGUE, UNSPECIFIED TYPE: ICD-10-CM

## 2023-10-23 PROCEDURE — 3008F PR BODY MASS INDEX (BMI) DOCUMENTED: ICD-10-PCS | Mod: CPTII,S$GLB,, | Performed by: INTERNAL MEDICINE

## 2023-10-23 PROCEDURE — 99214 PR OFFICE/OUTPT VISIT, EST, LEVL IV, 30-39 MIN: ICD-10-PCS | Mod: S$GLB,,, | Performed by: INTERNAL MEDICINE

## 2023-10-23 PROCEDURE — 3074F SYST BP LT 130 MM HG: CPT | Mod: CPTII,S$GLB,, | Performed by: INTERNAL MEDICINE

## 2023-10-23 PROCEDURE — 1159F MED LIST DOCD IN RCRD: CPT | Mod: CPTII,S$GLB,, | Performed by: INTERNAL MEDICINE

## 2023-10-23 PROCEDURE — 1159F PR MEDICATION LIST DOCUMENTED IN MEDICAL RECORD: ICD-10-PCS | Mod: CPTII,S$GLB,, | Performed by: INTERNAL MEDICINE

## 2023-10-23 PROCEDURE — 4010F PR ACE/ARB THEARPY RXD/TAKEN: ICD-10-PCS | Mod: CPTII,S$GLB,, | Performed by: INTERNAL MEDICINE

## 2023-10-23 PROCEDURE — 99214 OFFICE O/P EST MOD 30 MIN: CPT | Mod: S$GLB,,, | Performed by: INTERNAL MEDICINE

## 2023-10-23 PROCEDURE — 3078F DIAST BP <80 MM HG: CPT | Mod: CPTII,S$GLB,, | Performed by: INTERNAL MEDICINE

## 2023-10-23 PROCEDURE — 3078F PR MOST RECENT DIASTOLIC BLOOD PRESSURE < 80 MM HG: ICD-10-PCS | Mod: CPTII,S$GLB,, | Performed by: INTERNAL MEDICINE

## 2023-10-23 PROCEDURE — 3074F PR MOST RECENT SYSTOLIC BLOOD PRESSURE < 130 MM HG: ICD-10-PCS | Mod: CPTII,S$GLB,, | Performed by: INTERNAL MEDICINE

## 2023-10-23 PROCEDURE — 3008F BODY MASS INDEX DOCD: CPT | Mod: CPTII,S$GLB,, | Performed by: INTERNAL MEDICINE

## 2023-10-23 PROCEDURE — 4010F ACE/ARB THERAPY RXD/TAKEN: CPT | Mod: CPTII,S$GLB,, | Performed by: INTERNAL MEDICINE

## 2023-10-23 NOTE — PROGRESS NOTES
SUBJECTIVE:    Patient ID: Rich Ballard is a 63 y.o. male.    Chief Complaint: Follow-up (Follow up/His BiPAP no longer operates, needs new machine)    HPI The patient returns with his BiPAP dead.  It started making funny noises and quit working.  He has not has a new machine in over 10 years.  I do not have a copy of his original study that started him on an auto BiPAP.  I do have a home sleep study showing an AWA of 29.9.  The patient is very dependent on his BiPAP.  He is very tired without it.    Past Medical History:   Diagnosis Date    Allergy     Atrial fibrillation     Hyperlipidemia     Hypertension      Past Surgical History:   Procedure Laterality Date    ABLATION OF ARRHYTHMOGENIC FOCUS FOR ATRIAL FIBRILLATION N/A 4/5/2022    Procedure: ABLATION, ARRHYTHMOGENIC FOCUS, FOR ATRIAL FIBRILLATION;  Surgeon: Niko Casarez MD;  Location: [facility] EP LAB;  Service: Cardiology;  Laterality: N/A;  AF, GEOVANNI(Cx if SR), PVI, RFA, CARTO, GEN, GP, 3 PREP    APPENDECTOMY      CERVICAL FUSION      COLONOSCOPY N/A 2/1/2017    Procedure: COLONOSCOPY;  Surgeon: Kennedy Soto MD;  Location: [facility] ENDO;  Service: Endoscopy;  Laterality: N/A;    ppp      ROTATOR CUFF REPAIR      TRANSESOPHAGEAL ECHOCARDIOGRAPHY N/A 4/5/2022    Procedure: ECHOCARDIOGRAM, TRANSESOPHAGEAL;  Surgeon: Tray Leal MD;  Location: [facility] EP LAB;  Service: Cardiology;  Laterality: N/A;    TREATMENT OF CARDIAC ARRHYTHMIA N/A 2/17/2022    Procedure: CARDIOVERSION;  Surgeon: Kaleb Santana MD;  Location: [facility] CATH/EP LAB;  Service: Cardiology;  Laterality: N/A;    TREATMENT OF CARDIAC ARRHYTHMIA  4/5/2022    Procedure: Cardioversion or Defibrillation;  Surgeon: Niko Casarez MD;  Location: [facility] EP LAB;  Service: Cardiology;;    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       Family History   Problem Relation Age of Onset    Hypertension Mother     COPD Mother     Hypertension Father     Heart disease Father     COPD Father         Social History:   Marital  Status:   Occupation: Data Unavailable  Alcohol History:  reports no history of alcohol use.  Tobacco History:  reports that he quit smoking about 19 months ago. His smoking use included cigarettes. He started smoking about 34 years ago. He has a 16.2 pack-year smoking history. He has never used smokeless tobacco.  Drug History:  reports no history of drug use.    Review of patient's allergies indicates:   Allergen Reactions    Amiodarone analogues Shortness Of Breath       Current Outpatient Medications   Medication Sig Dispense Refill    amLODIPine (NORVASC) 10 MG tablet Take 1 tablet (10 mg total) by mouth every evening. 90 tablet 3    benazepriL (LOTENSIN) 40 MG tablet Take 1 tablet (40 mg total) by mouth every evening. 90 tablet 3    fluticasone propionate (FLONASE) 50 mcg/actuation nasal spray 2 sprays (100 mcg total) by Each Nostril route once daily. 16 g 5    metoprolol succinate (TOPROL-XL) 50 MG 24 hr tablet TAKE 1 TABLET EVERY DAY 90 tablet 1    rosuvastatin (CRESTOR) 5 MG tablet Take 1 tablet (5 mg total) by mouth once daily. 90 tablet 3    sotaloL (BETAPACE) 80 MG tablet Take 1 tablet (80 mg total) by mouth 2 (two) times daily. 180 tablet 3    terazosin (HYTRIN) 1 MG capsule Take 1 capsule (1 mg total) by mouth every evening. 30 capsule 11    triamterene-hydrochlorothiazide 37.5-25 mg (DYAZIDE) 37.5-25 mg per capsule Take 1 capsule by mouth every other day. 45 capsule 3     No current facility-administered medications for this visit.       Alpha-1 Antitrypsin:  Last PFT:   Last CT:    Review of Systems  General: Feeling tired.  Eyes: Vision is ok  ENT:  No sinusitis or pharyngitis.   Heart:: No chest pain or palpitations.  Lungs: No cough, sputum, or wheezing.  GI: No Nausea, vomiting, constipation, diarrhea, or reflux.  : No dysuria, hesitancy, or nocturia.  Musculoskeletal: No joint pain or myalgias.  Skin: No lesions or rashes.  Neuro: No headaches or neuropathy.  Lymph: No edema or  adenopathy.  Psych: No anxiety or depression.  Endo:  Wt stable    OBJECTIVE:      /64 (BP Location: Right arm, Patient Position: Sitting, BP Method: Medium (Manual))   Pulse 78   Wt 105.1 kg (231 lb 12.8 oz)   SpO2 (!) 92%   BMI 37.41 kg/m²     Physical Exam  GENERAL: Older patient in no distress.  HEENT: Pupils equal and reactive. Extraocular movements intact. Nose intact.      Pharynx moist.  Status post UPPP with good visualization of the posterior pharynx  NECK: Supple.  18 in  HEART: Regular rate and rhythm. No murmur or gallop auscultated.  LUNGS: Clear to auscultation and percussion. Lung excursion symmetrical. No change in fremitus. No adventitial noises.  ABDOMEN: Bowel sounds present. Non-tender, no masses palpated.  EXTREMITIES: Normal muscle tone and joint movement, no cyanosis or clubbing.   LYMPHATICS: No adenopathy palpated, no edema.  SKIN: Dry, intact, no lesions.   NEURO: Cranial nerves II-XII intact. Motor strength 5/5 bilaterally, upper and lower extremities.  PSYCH: Appropriate affect.    Patient's last sleep study showed an RDI of 29.9 and he has a history of PAF.    Assessment:       1. RONALDO (obstructive sleep apnea)    2. Fatigue, unspecified type          The patient's auto BiPAP is broken.  He needs a new machine ASAP.  His old machine showed a compliance of 100% with a normal AHI.  This was from his last download.  Plan:       RONALDO (obstructive sleep apnea)    -     BiPAP titration study as soon as possible    Patient owns his own auto BiPAP machine which has . He needs a new machine but I don't have an in lab study to know what settings to order.   Advised to bring his old machine to his sleep study so the techs can possibly see what he has been sleeping on.  Follow up in about 2 months (around 2023).

## 2023-10-29 ENCOUNTER — PROCEDURE VISIT (OUTPATIENT)
Dept: SLEEP MEDICINE | Facility: HOSPITAL | Age: 63
End: 2023-10-29
Attending: INTERNAL MEDICINE
Payer: COMMERCIAL

## 2023-10-29 DIAGNOSIS — G47.33 OSA (OBSTRUCTIVE SLEEP APNEA): ICD-10-CM

## 2023-10-29 PROCEDURE — 95811 POLYSOM 6/>YRS CPAP 4/> PARM: CPT

## 2023-10-30 ENCOUNTER — TELEPHONE (OUTPATIENT)
Dept: PULMONOLOGY | Facility: HOSPITAL | Age: 63
End: 2023-10-30

## 2023-10-30 DIAGNOSIS — G47.33 OSA (OBSTRUCTIVE SLEEP APNEA): Primary | ICD-10-CM

## 2023-10-30 NOTE — TELEPHONE ENCOUNTER
Patient's BiPAP titration shows that he does well at 24/20.  He used a medium F30 [employer] med mask.  Will order this now.  Left a message for him.

## 2023-11-14 ENCOUNTER — TELEPHONE (OUTPATIENT)
Dept: PULMONOLOGY | Facility: CLINIC | Age: 63
End: 2023-11-14

## 2023-11-14 ENCOUNTER — PATIENT MESSAGE (OUTPATIENT)
Dept: PULMONOLOGY | Facility: CLINIC | Age: 63
End: 2023-11-14

## 2023-11-14 NOTE — TELEPHONE ENCOUNTER
----- Message from Katelin Guzman sent at 11/14/2023  2:48 PM CST -----  Regarding: Bipap  Contact: [phone]  Good afternoon! Order for the Bipap was denied and re-submitted. Thanks! Ann Marie

## 2023-11-27 NOTE — TELEPHONE ENCOUNTER
I went ahead and faxed pts orders to [employer] [facility] since [facility] shows that the auth is still pending.

## 2024-02-26 ENCOUNTER — OFFICE VISIT (OUTPATIENT)
Dept: PULMONOLOGY | Facility: CLINIC | Age: 64
End: 2024-02-26
Payer: COMMERCIAL

## 2024-02-26 VITALS
WEIGHT: 242 LBS | BODY MASS INDEX: 39.06 KG/M2 | SYSTOLIC BLOOD PRESSURE: 160 MMHG | DIASTOLIC BLOOD PRESSURE: 80 MMHG | OXYGEN SATURATION: 95 % | HEART RATE: 89 BPM

## 2024-02-26 DIAGNOSIS — G47.33 OSA (OBSTRUCTIVE SLEEP APNEA): Primary | ICD-10-CM

## 2024-02-26 PROBLEM — R53.83 FATIGUE: Status: RESOLVED | Noted: 2023-10-23 | Resolved: 2024-02-26

## 2024-02-26 PROCEDURE — 4010F ACE/ARB THERAPY RXD/TAKEN: CPT | Mod: CPTII,S$GLB,, | Performed by: INTERNAL MEDICINE

## 2024-02-26 PROCEDURE — 3077F SYST BP >= 140 MM HG: CPT | Mod: CPTII,S$GLB,, | Performed by: INTERNAL MEDICINE

## 2024-02-26 PROCEDURE — 3079F DIAST BP 80-89 MM HG: CPT | Mod: CPTII,S$GLB,, | Performed by: INTERNAL MEDICINE

## 2024-02-26 PROCEDURE — 1159F MED LIST DOCD IN RCRD: CPT | Mod: CPTII,S$GLB,, | Performed by: INTERNAL MEDICINE

## 2024-02-26 PROCEDURE — 99213 OFFICE O/P EST LOW 20 MIN: CPT | Mod: S$GLB,,, | Performed by: INTERNAL MEDICINE

## 2024-02-26 PROCEDURE — 3008F BODY MASS INDEX DOCD: CPT | Mod: CPTII,S$GLB,, | Performed by: INTERNAL MEDICINE

## 2024-02-26 NOTE — PROGRESS NOTES
SUBJECTIVE:    Patient ID: Rich Ballard is a 63 y.o. male.    Chief Complaint: Follow-up (CPAP follow up)    HPI The patient returns with his new Bipap.  His fatigue has resolved. He had Covid in the beginning of February.  He has gained weight and his BP is high.  He stress eats.  He is begun his diet again.    Past Medical History:   Diagnosis Date    Allergy     Atrial fibrillation     Hyperlipidemia     Hypertension      Past Surgical History:   Procedure Laterality Date    ABLATION OF ARRHYTHMOGENIC FOCUS FOR ATRIAL FIBRILLATION N/A 4/5/2022    Procedure: ABLATION, ARRHYTHMOGENIC FOCUS, FOR ATRIAL FIBRILLATION;  Surgeon: Niko Casarez MD;  Location: [facility] EP LAB;  Service: Cardiology;  Laterality: N/A;  AF, GEOVANNI(Cx if SR), PVI, RFA, CARTO, GEN, GP, 3 PREP    APPENDECTOMY      CERVICAL FUSION      COLONOSCOPY N/A 2/1/2017    Procedure: COLONOSCOPY;  Surgeon: Kennedy Soto MD;  Location: [facility] ENDO;  Service: Endoscopy;  Laterality: N/A;    ppp      ROTATOR CUFF REPAIR      TRANSESOPHAGEAL ECHOCARDIOGRAPHY N/A 4/5/2022    Procedure: ECHOCARDIOGRAM, TRANSESOPHAGEAL;  Surgeon: Tray Leal MD;  Location: [facility] EP LAB;  Service: Cardiology;  Laterality: N/A;    TREATMENT OF CARDIAC ARRHYTHMIA N/A 2/17/2022    Procedure: CARDIOVERSION;  Surgeon: Kaleb Santana MD;  Location: [facility] CATH/EP LAB;  Service: Cardiology;  Laterality: N/A;    TREATMENT OF CARDIAC ARRHYTHMIA  4/5/2022    Procedure: Cardioversion or Defibrillation;  Surgeon: Niko Casarez MD;  Location: [facility] EP LAB;  Service: Cardiology;;    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       Family History   Problem Relation Age of Onset    Hypertension Mother     COPD Mother     Hypertension Father     Heart disease Father     COPD Father         Social History:   Marital Status:   Occupation: Data Unavailable  Alcohol History:  reports no history of alcohol use.  Tobacco History:  reports that he quit smoking about 1 years ago. His smoking use included  cigarettes. He started smoking about 34 years ago. He has a 16.2 pack-year smoking history. He has never used smokeless tobacco.  Drug History:  reports no history of drug use.    Review of patient's allergies indicates:   Allergen Reactions    Amiodarone analogues Shortness Of Breath       Current Outpatient Medications   Medication Sig Dispense Refill    amLODIPine (NORVASC) 10 MG tablet Take 1 tablet (10 mg total) by mouth every evening. 90 tablet 3    benazepriL (LOTENSIN) 40 MG tablet Take 1 tablet (40 mg total) by mouth every evening. 90 tablet 3    fluticasone propionate (FLONASE) 50 mcg/actuation nasal spray 2 sprays (100 mcg total) by Each Nostril route once daily. 16 g 5    metoprolol succinate (TOPROL-XL) 50 MG 24 hr tablet TAKE 1 TABLET EVERY DAY 90 tablet 1    rosuvastatin (CRESTOR) 5 MG tablet Take 1 tablet (5 mg total) by mouth once daily. 90 tablet 3    sotaloL (BETAPACE) 80 MG tablet Take 1 tablet (80 mg total) by mouth 2 (two) times daily. 180 tablet 3    terazosin (HYTRIN) 1 MG capsule Take 1 capsule (1 mg total) by mouth every evening. 30 capsule 11    triamterene-hydrochlorothiazide 37.5-25 mg (DYAZIDE) 37.5-25 mg per capsule Take 1 capsule by mouth every other day. 45 capsule 3     No current facility-administered medications for this visit.       Alpha-1 Antitrypsin:  Last PFT:   Last CT:    Review of Systems  General: Feeling much better  Eyes: Vision is ok  ENT:  No sinusitis or pharyngitis.   Heart:: No chest pain or palpitations.  Lungs: No cough, sputum, or wheezing.  GI: No Nausea, vomiting, constipation, diarrhea, or reflux.  : No dysuria, hesitancy, or nocturia.  Musculoskeletal: No joint pain or myalgias.  Skin: No lesions or rashes.  Neuro: No headaches or neuropathy.  Lymph: No edema or adenopathy.  Psych: No anxiety or depression.  Endo:  up 11 pounds since last visit    OBJECTIVE:      BP (!) 160/80 (BP Location: Right arm, Patient Position: Sitting, BP Method: Medium (Manual))    Pulse 89   Wt 109.8 kg (242 lb)   SpO2 95%   BMI 39.06 kg/m²     Physical Exam  GENERAL: Older patient in no distress.  HEENT: Pupils equal and reactive. Extraocular movements intact. Nose intact.      Pharynx moist.  Status post UPPP with good visualization of the posterior pharynx  NECK: Supple.  18 in  HEART: Regular rate and rhythm. No murmur or gallop auscultated.  LUNGS: Clear to auscultation and percussion. Lung excursion symmetrical. No change in fremitus. No adventitial noises.  ABDOMEN: Bowel sounds present. Non-tender, no masses palpated.  EXTREMITIES: Normal muscle tone and joint movement, no cyanosis or clubbing.   LYMPHATICS: No adenopathy palpated, no edema.  SKIN: Dry, intact, no lesions.   NEURO: Cranial nerves II-XII intact. Motor strength 5/5 bilaterally, upper and lower extremities.  PSYCH: Appropriate affect.    Patient's last sleep study showed an RDI of 29.9 and he has a history of PAF.    Assessment:       1. RONALDO (obstructive sleep apnea)              Plan:       RONALDO (obstructive sleep apnea)    Continue Bipap nightly  Follow up in about 6 months (around 8/26/2024).  Reviewed cleanliness, distilled water, and compliance.  Call for any problems       no

## 2024-03-04 RX ORDER — TERAZOSIN 1 MG/1
1 CAPSULE ORAL NIGHTLY
Qty: 90 CAPSULE | Refills: 3 | OUTPATIENT
Start: 2024-03-04

## 2024-03-04 RX ORDER — TERAZOSIN 1 MG/1
1 CAPSULE ORAL NIGHTLY
Qty: 30 CAPSULE | Refills: 2 | Status: CANCELLED | OUTPATIENT
Start: 2024-03-04 | End: 2025-03-04

## 2024-03-04 NOTE — TELEPHONE ENCOUNTER
I called the [facility] , Phone [phone]    Pharmacist told me to leave on the voice mail.   Terazosin HYTRIN 1 mg , 30 , 6rf . Dr Mancia .

## 2024-03-04 NOTE — TELEPHONE ENCOUNTER
----- Message from Kristen Linda sent at 3/4/2024  2:50 PM CST -----  Contact: Wife Cynthia Martins wife is calling back in regards to the refill request sent on Friday and this morning on his terazosin (HYTRIN) 1 MG capsule, stated she had requested for a 90 day supply be sent in to [facility] and nothing  Has been done yet. Pt is completely out and needs to speak to someone about this. Can we please call pt back to advise. Thank You.    [facility]/pharmacy #[id number] - [place] - [address]  [address] [place] [address]  Phone: [phone] Fax: [phone]

## 2024-03-04 NOTE — TELEPHONE ENCOUNTER
----- Message from Gabriella Bruce sent at 3/2/2024 10:36 AM CST -----  Contact: pt  Type: Needs Medical Advice  Who Called:  pt  Best Call Back Number: [phone]    Additional Information: Pt is calling the office needs terazosin (HYTRIN) 1 MG capsule for 90  days instead of 30 days [facility]  [id number] - [place] - [address]  [address] [place] [address]  Phone: [phone] Fax: [phone]  Please call back and advise.

## 2024-04-16 ENCOUNTER — E-VISIT (OUTPATIENT)
Dept: FAMILY MEDICINE | Facility: CLINIC | Age: 64
End: 2024-04-16
Payer: COMMERCIAL

## 2024-04-16 ENCOUNTER — TELEPHONE (OUTPATIENT)
Dept: FAMILY MEDICINE | Facility: CLINIC | Age: 64
End: 2024-04-16
Payer: COMMERCIAL

## 2024-04-16 DIAGNOSIS — I10 ESSENTIAL HYPERTENSION: ICD-10-CM

## 2024-04-16 DIAGNOSIS — I10 PRIMARY HYPERTENSION: Primary | ICD-10-CM

## 2024-04-16 DIAGNOSIS — I48.91 ATRIAL FIBRILLATION, NEW ONSET: ICD-10-CM

## 2024-04-16 PROCEDURE — 99422 OL DIG E/M SVC 11-20 MIN: CPT | Mod: ,,, | Performed by: STUDENT IN AN ORGANIZED HEALTH CARE EDUCATION/TRAINING PROGRAM

## 2024-04-16 NOTE — TELEPHONE ENCOUNTER
----- Message from Barbara Jc sent at 4/16/2024  2:21 PM CDT -----  Regarding: Refill request  Contact: pt's wife  Type:  RX Refill Request    Who Called: pt's wife    Refill or New Rx:refill    RX Name and Strength:   metoprolol succinate (TOPROL-XL) 50 MG 24 hr tablet         How is the patient currently taking it? (ex. 1XDay):1/day    Is this a 30 day or 90 day RX:90    Preferred Pharmacy with phone number:  [facility]/pharmacy #[id number] - [place], [place] - [address]   [address] [place] [address]  Phone: [phone] Fax: [phone]    Local or Mail Order:local    Ordering Provider:mp    Would the patient rather a call back or a response via [facility]? Call back    Best Call Back Number:[phone]    Additional Information: pt has only 2 pills left and is requesting a refill.   Please advise.  Thank you.

## 2024-04-17 DIAGNOSIS — I48.91 ATRIAL FIBRILLATION, NEW ONSET: ICD-10-CM

## 2024-04-17 NOTE — TELEPHONE ENCOUNTER
----- Message from Gabriella Bruce sent at 4/17/2024  4:42 PM CDT -----  Contact: pt  Type: Needs Medical Advice  Who Called: pt  Best Call Back Number: [phone]    Additional Information: Pt is calling the office trying to get a refill on metoprolol succinate (TOPROL-XL) 50 MG 24 hr tablet for 90 days sent to   [facility]/pharmacy #[id number] - [place] - [address]  [address] [place] [address]  Phone: [phone] Fax: [phone]    Please call back and advise.

## 2024-04-18 VITALS — SYSTOLIC BLOOD PRESSURE: 117 MMHG | DIASTOLIC BLOOD PRESSURE: 77 MMHG

## 2024-04-18 RX ORDER — METOPROLOL SUCCINATE 50 MG/1
50 TABLET, EXTENDED RELEASE ORAL DAILY
Qty: 90 TABLET | Refills: 0 | Status: CANCELLED | OUTPATIENT
Start: 2024-04-18

## 2024-04-18 NOTE — PROGRESS NOTES
Patient ID: Rich Ballard is a 63 y.o. male.    Chief Complaint: Hypertension (Entered automatically based on patient selection in Patient Portal.)          274}  The patient initiated a request through [employer] on 4/16/2024 for evaluation and management with a chief complaint of Hypertension (Entered automatically based on patient selection in Patient Portal.)     I evaluated the questionnaire submission on 04/19/2024 .    Total Time (in minutes): 12     Ohs Peq Evisit Hypertension    4/18/2024  4:18 PM CDT - Filed by Patient   Do you agree to participate in an E-Visit? Yes   If you have any of the following symptoms, please do not complete an E-Visit. Instead, schedule an appointment with your provider I acknowledge   Choose the state of your primary residence [place]   What would you like addressed about your blood pressure? New concern   What is the main issue you would like addressed today? Prescription refill   Your blood pressure is a: Recurring problem   When were you first diagnosed with high blood pressure? More than 1 year ago   Since you first learned you had high blood pressure, how has it changed? Unchanged   How would you classify your blood pressure? Well controlled   Are you having any of the following symptoms from your high blood pressure? None of the above   Are you taking any of the following medications? None of these   The following factors can make high blood pressure worse or harder to control. Which of them might be contributing to your high blood pressure?  None of these   Have you taken blood pressure medications in the past that caused you problems or side effects? No   Are you currently taking medication(s) for your blood pressure? Yes   Have you recently started a new medication or changed your dose? No   How often are you taking your medication per week?  Every day   Have you had any side effects from your current blood pressure medication? No   Are you able to take your blood  pressure? Yes   Reading 1 122/82-4/13/24-3:00am   Reading 2 120/77-4/14/24-3:30am   Reading 3 117/75-4/15/24-3:00am   Reading 4 120/80-4/16/24-3:00am   Reading 5 117/77-4/17/24-3:00am   If you are able to take your pulse, please provide it below. 72   Provide any additional information you feel is important.    Please attach any relevant images or files    Are you able to take any other vitals? No          Active Problem List with Overview Notes    Diagnosis Date Noted    RONALDO (obstructive sleep apnea) 10/23/2023    Seasonal allergic rhinitis 06/19/2023    Upper respiratory tract infection 06/19/2023    Tachycardia 01/14/2022    Irregularly irregular pulse rhythm 01/14/2022    Atrial fibrillation, new onset 01/14/2022    Severe obesity with body mass index (BMI) of 35.0 to 39.9 with comorbidity 06/23/2021    Screen for colon cancer 02/01/2017    Hypertension     Hyperlipidemia     Corneal edema, unspecified 06/06/2011     Dx updated per 2019 IMO Load      Unspecified disorder of conjunctiva 06/05/2011      Recent Labs Obtained:  Lab Results   Component Value Date    WBC 6.68 06/20/2023    HGB 15.3 06/20/2023    HCT 47.4 06/20/2023    MCV 95 06/20/2023     06/20/2023     06/20/2023    K 4.7 06/20/2023    GLU 98 06/20/2023    CREATININE 0.8 06/20/2023    EGFRNORACEVR >60.0 06/20/2023      Review of patient's allergies indicates:   Allergen Reactions    Amiodarone analogues Shortness Of Breath       Encounter Diagnoses   Name Primary?    Primary hypertension Yes    Essential hypertension     Atrial fibrillation, new onset         No orders of the defined types were placed in this encounter.     Medications Ordered This Encounter   Medications    amLODIPine (NORVASC) 10 MG tablet     Sig: Take 1 tablet (10 mg total) by mouth every evening.     Dispense:  90 tablet     Refill:  3     Requesting 1 year supply    benazepriL (LOTENSIN) 40 MG tablet     Sig: Take 1 tablet (40 mg total) by mouth every evening.      Dispense:  90 tablet     Refill:  3     Requesting 1 year supply    metoprolol succinate (TOPROL-XL) 50 MG 24 hr tablet     Sig: Take 1 tablet (50 mg total) by mouth once daily.     Dispense:  90 tablet     Refill:  1     .    rosuvastatin (CRESTOR) 5 MG tablet     Sig: Take 1 tablet (5 mg total) by mouth once daily.     Dispense:  90 tablet     Refill:  3    terazosin (HYTRIN) 1 MG capsule     Sig: Take 1 capsule (1 mg total) by mouth every evening.     Dispense:  30 capsule     Refill:  11    triamterene-hydrochlorothiazide 37.5-25 mg (DYAZIDE) 37.5-25 mg per capsule     Sig: Take 1 capsule by mouth every other day.     Dispense:  45 capsule     Refill:  3     .    Will continue current meds     E-Visit Time Tracking:    Day 1 Time (in minutes): 12    Total Time (in minutes): 12      274}

## 2024-04-19 RX ORDER — TERAZOSIN 1 MG/1
1 CAPSULE ORAL NIGHTLY
Qty: 30 CAPSULE | Refills: 11 | Status: SHIPPED | OUTPATIENT
Start: 2024-04-19 | End: 2025-04-19

## 2024-04-19 RX ORDER — BENAZEPRIL HYDROCHLORIDE 40 MG/1
40 TABLET ORAL NIGHTLY
Qty: 90 TABLET | Refills: 3 | Status: SHIPPED | OUTPATIENT
Start: 2024-04-19

## 2024-04-19 RX ORDER — ROSUVASTATIN CALCIUM 5 MG/1
5 TABLET, COATED ORAL DAILY
Qty: 90 TABLET | Refills: 3 | Status: SHIPPED | OUTPATIENT
Start: 2024-04-19 | End: 2025-04-19

## 2024-04-19 RX ORDER — METOPROLOL SUCCINATE 50 MG/1
50 TABLET, EXTENDED RELEASE ORAL DAILY
Qty: 90 TABLET | Refills: 1 | Status: SHIPPED | OUTPATIENT
Start: 2024-04-19

## 2024-04-19 RX ORDER — AMLODIPINE BESYLATE 10 MG/1
10 TABLET ORAL NIGHTLY
Qty: 90 TABLET | Refills: 3 | Status: SHIPPED | OUTPATIENT
Start: 2024-04-19

## 2024-04-19 RX ORDER — TRIAMTERENE AND HYDROCHLOROTHIAZIDE 37.5; 25 MG/1; MG/1
1 CAPSULE ORAL EVERY OTHER DAY
Qty: 45 CAPSULE | Refills: 3 | Status: SHIPPED | OUTPATIENT
Start: 2024-04-19 | End: 2025-04-19

## 2024-06-27 DIAGNOSIS — J30.2 SEASONAL ALLERGIC RHINITIS, UNSPECIFIED TRIGGER: ICD-10-CM

## 2024-06-28 RX ORDER — FLUTICASONE PROPIONATE 50 MCG
2 SPRAY, SUSPENSION (ML) NASAL
Qty: 16 ML | Refills: 5 | Status: SHIPPED | OUTPATIENT
Start: 2024-06-28

## 2024-09-03 ENCOUNTER — OFFICE VISIT (OUTPATIENT)
Dept: CARDIOLOGY | Facility: CLINIC | Age: 64
End: 2024-09-03
Payer: COMMERCIAL

## 2024-09-03 ENCOUNTER — LAB VISIT (OUTPATIENT)
Dept: LAB | Facility: HOSPITAL | Age: 64
End: 2024-09-03
Payer: COMMERCIAL

## 2024-09-03 VITALS
DIASTOLIC BLOOD PRESSURE: 95 MMHG | BODY MASS INDEX: 39.22 KG/M2 | WEIGHT: 244.06 LBS | HEART RATE: 78 BPM | HEIGHT: 66 IN | OXYGEN SATURATION: 98 % | SYSTOLIC BLOOD PRESSURE: 152 MMHG

## 2024-09-03 DIAGNOSIS — I10 PRIMARY HYPERTENSION: ICD-10-CM

## 2024-09-03 DIAGNOSIS — I48.91 ATRIAL FIBRILLATION, NEW ONSET: ICD-10-CM

## 2024-09-03 DIAGNOSIS — E66.01 SEVERE OBESITY WITH BODY MASS INDEX (BMI) OF 35.0 TO 39.9 WITH COMORBIDITY: ICD-10-CM

## 2024-09-03 DIAGNOSIS — G47.33 OSA (OBSTRUCTIVE SLEEP APNEA): ICD-10-CM

## 2024-09-03 DIAGNOSIS — I48.91 ATRIAL FIBRILLATION, NEW ONSET: Primary | ICD-10-CM

## 2024-09-03 DIAGNOSIS — E78.2 MIXED HYPERLIPIDEMIA: ICD-10-CM

## 2024-09-03 DIAGNOSIS — H11.9 UNSPECIFIED DISORDER OF CONJUNCTIVA: ICD-10-CM

## 2024-09-03 LAB
ALBUMIN SERPL BCP-MCNC: 4.5 G/DL (ref 3.5–5.2)
ALP SERPL-CCNC: 47 U/L (ref 55–135)
ALT SERPL W/O P-5'-P-CCNC: 48 U/L (ref 10–44)
ANION GAP SERPL CALC-SCNC: 10 MMOL/L (ref 8–16)
AST SERPL-CCNC: 24 U/L (ref 10–40)
BASOPHILS # BLD AUTO: 0.02 K/UL (ref 0–0.2)
BASOPHILS NFR BLD: 0.3 % (ref 0–1.9)
BILIRUB SERPL-MCNC: 0.4 MG/DL (ref 0.1–1)
BUN SERPL-MCNC: 16 MG/DL (ref 8–23)
CALCIUM SERPL-MCNC: 9.1 MG/DL (ref 8.7–10.5)
CHLORIDE SERPL-SCNC: 103 MMOL/L (ref 95–110)
CHOLEST SERPL-MCNC: 183 MG/DL (ref 120–199)
CHOLEST/HDLC SERPL: 4.3 {RATIO} (ref 2–5)
CO2 SERPL-SCNC: 26 MMOL/L (ref 23–29)
CREAT SERPL-MCNC: 0.8 MG/DL (ref 0.5–1.4)
DIFFERENTIAL METHOD BLD: ABNORMAL
EOSINOPHIL # BLD AUTO: 0.1 K/UL (ref 0–0.5)
EOSINOPHIL NFR BLD: 1.8 % (ref 0–8)
ERYTHROCYTE [DISTWIDTH] IN BLOOD BY AUTOMATED COUNT: 12.7 % (ref 11.5–14.5)
EST. GFR  (NO RACE VARIABLE): >60 ML/MIN/1.73 M^2
ESTIMATED AVG GLUCOSE: 131 MG/DL (ref 68–131)
GLUCOSE SERPL-MCNC: 87 MG/DL (ref 70–110)
HBA1C MFR BLD: 6.2 % (ref 4.5–6.2)
HCT VFR BLD AUTO: 44.4 % (ref 40–54)
HDLC SERPL-MCNC: 43 MG/DL (ref 40–75)
HDLC SERPL: 23.5 % (ref 20–50)
HGB BLD-MCNC: 14.8 G/DL (ref 14–18)
IMM GRANULOCYTES # BLD AUTO: 0.02 K/UL (ref 0–0.04)
IMM GRANULOCYTES NFR BLD AUTO: 0.3 % (ref 0–0.5)
LDLC SERPL CALC-MCNC: 99.6 MG/DL (ref 63–159)
LYMPHOCYTES # BLD AUTO: 2.7 K/UL (ref 1–4.8)
LYMPHOCYTES NFR BLD: 38 % (ref 18–48)
MCH RBC QN AUTO: 31 PG (ref 27–31)
MCHC RBC AUTO-ENTMCNC: 33.3 G/DL (ref 32–36)
MCV RBC AUTO: 93 FL (ref 82–98)
MONOCYTES # BLD AUTO: 0.7 K/UL (ref 0.3–1)
MONOCYTES NFR BLD: 9.4 % (ref 4–15)
NEUTROPHILS # BLD AUTO: 3.6 K/UL (ref 1.8–7.7)
NEUTROPHILS NFR BLD: 50.2 % (ref 38–73)
NONHDLC SERPL-MCNC: 140 MG/DL
NRBC BLD-RTO: 0 /100 WBC
PLATELET # BLD AUTO: 215 K/UL (ref 150–450)
PMV BLD AUTO: 8.7 FL (ref 9.2–12.9)
POTASSIUM SERPL-SCNC: 4.4 MMOL/L (ref 3.5–5.1)
PROT SERPL-MCNC: 7.4 G/DL (ref 6–8.4)
RBC # BLD AUTO: 4.77 M/UL (ref 4.6–6.2)
SODIUM SERPL-SCNC: 139 MMOL/L (ref 136–145)
TRIGL SERPL-MCNC: 202 MG/DL (ref 30–150)
TSH SERPL DL<=0.005 MIU/L-ACNC: 1 UIU/ML (ref 0.34–5.6)
WBC # BLD AUTO: 7.15 K/UL (ref 3.9–12.7)

## 2024-09-03 PROCEDURE — 1160F RVW MEDS BY RX/DR IN RCRD: CPT | Mod: CPTII,S$GLB,,

## 2024-09-03 PROCEDURE — 3008F BODY MASS INDEX DOCD: CPT | Mod: CPTII,S$GLB,,

## 2024-09-03 PROCEDURE — 80061 LIPID PANEL: CPT

## 2024-09-03 PROCEDURE — 3077F SYST BP >= 140 MM HG: CPT | Mod: CPTII,S$GLB,,

## 2024-09-03 PROCEDURE — 36415 COLL VENOUS BLD VENIPUNCTURE: CPT

## 2024-09-03 PROCEDURE — 99214 OFFICE O/P EST MOD 30 MIN: CPT | Mod: S$GLB,,,

## 2024-09-03 PROCEDURE — 3080F DIAST BP >= 90 MM HG: CPT | Mod: CPTII,S$GLB,,

## 2024-09-03 PROCEDURE — 85025 COMPLETE CBC W/AUTO DIFF WBC: CPT

## 2024-09-03 PROCEDURE — 83036 HEMOGLOBIN GLYCOSYLATED A1C: CPT

## 2024-09-03 PROCEDURE — 99999 PR PBB SHADOW E&M-EST. PATIENT-LVL IV: CPT | Mod: PBBFAC,,,

## 2024-09-03 PROCEDURE — 1159F MED LIST DOCD IN RCRD: CPT | Mod: CPTII,S$GLB,,

## 2024-09-03 PROCEDURE — 80053 COMPREHEN METABOLIC PANEL: CPT

## 2024-09-03 PROCEDURE — 4010F ACE/ARB THERAPY RXD/TAKEN: CPT | Mod: CPTII,S$GLB,,

## 2024-09-03 PROCEDURE — 84443 ASSAY THYROID STIM HORMONE: CPT

## 2024-09-03 RX ORDER — SOTALOL HYDROCHLORIDE 80 MG/1
80 TABLET ORAL 2 TIMES DAILY
Qty: 180 TABLET | Refills: 3 | Status: SHIPPED | OUTPATIENT
Start: 2024-09-03 | End: 2025-09-03

## 2024-09-03 NOTE — ASSESSMENT & PLAN NOTE
BMI is 39.39.  He has been noncompliant with his cardiac calorie restricted diet.  Recommend compliance with calorie restricted diet.  Recommend exercise as tolerated and weight loss.  
Continue statin therapy.  Continue low-sodium heart healthy diet.  Obtain fasting lipid panel  
He was compliant with using his CPAP nightly.  Continue the same.  
Monitor blood pressure closely at home.  Hypertensive today in office.  Recommend continuing benazepril 40 mg daily, amlodipine 10 mg daily, sotalol 80 mg daily, metoprolol succinate 50 mg daily, and terazosin 1 mg daily, and triamterene hydrochlorothiazide every other day.  Low-sodium heart healthy diet.  Blood pressure is elevated today in office however patient states this is secondary to stress from trying to find a parking spot in the garage.  Recommend maintaining blood pressure 130/80 or less.  
Patient reports corneal ulcer to right eye, continue to follow with ophthalmology.  
IV intact

## 2024-09-03 NOTE — PROGRESS NOTES
Subjective:    Patient ID:  Rich Ballard is a 64 y.o. male patient here for evaluation Atrial Fibrillation (Symptoms since ) and Medication Refill      History of Present Illness:     Patient was here today for a checkup.  He denies chest pain shortness breath lightheadedness,, jaw neck or arm, edema or bleeding.  Blood pressure is elevated today in office 152/95.  Patient states that there was a lack traffic in the parking garage and had an stressed out.  He states his blood pressure is always between 120 to 130/80 at home.  He has been NPO today in wants to have labs performed.  He was compliant with his CPAP.  No symptoms or episodes of atrial fibrillation since ablation.  He was not on anticoagulation since after ablation.  Remains on sotalol and metoprolol.        Review of patient's allergies indicates:   Allergen Reactions    Amiodarone analogues Shortness Of Breath       Past Medical History:   Diagnosis Date    Allergy     Atrial fibrillation     Hyperlipidemia     Hypertension      Past Surgical History:   Procedure Laterality Date    ABLATION OF ARRHYTHMOGENIC FOCUS FOR ATRIAL FIBRILLATION N/A 4/5/2022    Procedure: ABLATION, ARRHYTHMOGENIC FOCUS, FOR ATRIAL FIBRILLATION;  Surgeon: Niko Casarez MD;  Location: [facility] EP LAB;  Service: Cardiology;  Laterality: N/A;  AF, GEOVANNI(Cx if SR), PVI, RFA, CARTO, GEN, GP, 3 PREP    APPENDECTOMY      CERVICAL FUSION      COLONOSCOPY N/A 2/1/2017    Procedure: COLONOSCOPY;  Surgeon: eKnnedy Soto MD;  Location: [facility] ENDO;  Service: Endoscopy;  Laterality: N/A;    ppp      ROTATOR CUFF REPAIR      TRANSESOPHAGEAL ECHOCARDIOGRAPHY N/A 4/5/2022    Procedure: ECHOCARDIOGRAM, TRANSESOPHAGEAL;  Surgeon: Tray Leal MD;  Location: [facility] EP LAB;  Service: Cardiology;  Laterality: N/A;    TREATMENT OF CARDIAC ARRHYTHMIA N/A 2/17/2022    Procedure: CARDIOVERSION;  Surgeon: Kaleb Santana MD;  Location: [facility] CATH/EP LAB;  Service: Cardiology;  Laterality: N/A;     TREATMENT OF CARDIAC ARRHYTHMIA  2022    Procedure: Cardioversion or Defibrillation;  Surgeon: Niko Casarez MD;  Location: [facility];  Service: Cardiology;;    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       Social History     Tobacco Use    Smoking status: Former     Current packs/day: 0.00     Average packs/day: 0.5 packs/day for 32.4 years (16.2 ttl pk-yrs)     Types: Cigarettes     Start date: 10/16/1989     Quit date: 3/1/2022     Years since quittin.5    Smokeless tobacco: Never   Substance Use Topics    Alcohol use: No     Comment: very rarely    Drug use: No        Review of Systems:    As noted in HPI in addition      REVIEW OF SYSTEMS  CARDIOVASCULAR: No recent chest pain, palpitations, arm, neck, or jaw pain  RESPIRATORY: No recent fever, cough chills, SOB or congestion  : No blood in the urine  GI: No Nausea, vomiting, constipation, diarrhea, blood, or reflux.  MUSCULOSKELETAL: No myalgias  NEURO: No lightheadedness or dizziness  EYES: No Double vision, blurry, vision or headache              Objective        Vitals:    24 1036   BP: (!) 152/95   Pulse: 78       LIPIDS - LAST 2   Lab Results   Component Value Date    CHOL 202 (H) 2023    CHOL 217 (H) 2021    HDL 37 (L) 2023    HDL 39 (L) 2021    LDLCALC 137.4 2023    LDLCALC 156.0 2021    TRIG 138 2023    TRIG 110 2021    CHOLHDL 18.3 (L) 2023    CHOLHDL 18.0 (L) 2021       CBC - LAST 2  Lab Results   Component Value Date    WBC 7.15 2024    WBC 6.68 2023    RBC 4.77 2024    RBC 4.99 2023    HGB 14.8 2024    HGB 15.3 2023    HCT 44.4 2024    HCT 47.4 2023    MCV 93 2024    MCV 95 2023    MCH 31.0 2024    MCH 30.7 2023    MCHC 33.3 2024    MCHC 32.3 2023    RDW 12.7 2024    RDW 12.3 2023     2024     2023    MPV 8.7 (L) 2024    MPV 9.3 2023    GRAN 3.6  "09/03/2024    GRAN 50.2 09/03/2024    LYMPH 2.7 09/03/2024    LYMPH 38.0 09/03/2024    MONO 0.7 09/03/2024    MONO 9.4 09/03/2024    BASO 0.02 09/03/2024    BASO 0.02 06/20/2023    NRBC 0 09/03/2024    NRBC 0 06/20/2023       CHEMISTRY & LIVER FUNCTION - LAST 2  Lab Results   Component Value Date     06/20/2023     04/01/2022    K 4.7 06/20/2023    K 4.5 04/01/2022     06/20/2023     04/01/2022    CO2 26 06/20/2023    CO2 26 04/01/2022    ANIONGAP 9 06/20/2023    ANIONGAP 9 04/01/2022    BUN 14 06/20/2023    BUN 15 04/01/2022    CREATININE 0.8 06/20/2023    CREATININE 0.8 04/01/2022    GLU 98 06/20/2023     04/01/2022    CALCIUM 9.4 06/20/2023    CALCIUM 9.2 04/01/2022    MG 1.7 06/20/2023    ALBUMIN 4.1 01/27/2022    ALBUMIN 3.9 07/07/2021    PROT 7.2 01/27/2022    PROT 7.0 07/07/2021    ALKPHOS 72 01/27/2022    ALKPHOS 62 07/07/2021    ALT 27 01/27/2022    ALT 34 07/07/2021    AST 22 01/27/2022    AST 20 07/07/2021    BILITOT 0.8 01/27/2022    BILITOT 0.4 07/07/2021        CARDIAC PROFILE - LAST 2  No results found for: "BNP", "CPK", "CPKMB", "LDH", "TROPONINI", "TROPONINIHS"     COAGULATION - LAST 2  Lab Results   Component Value Date    LABPT 14.3 (H) 01/27/2022    INR 1.0 04/01/2022    INR 1.2 01/27/2022    APTT 29.6 04/01/2022       ENDOCRINE & PSA - LAST 2  Lab Results   Component Value Date    TSH 1.183 12/23/2016    TSH 0.674 09/03/2014    PSA 3.4 07/07/2021    PSA 2.4 03/01/2018        ECHOCARDIOGRAM RESULTS  Results for orders placed during the hospital encounter of 04/05/22    Transesophageal echo (GEOVANNI)    Interpretation Summary  · The left ventricle is normal in size with mildly decreased systolic function. The estimated ejection fraction is 45%.  · There is mild left ventricular global hypokinesis.  · Normal right ventricular size with normal right ventricular systolic function.  · Biatrial enlargement.  · Normal appearing left atrial appendage. No thrombus is present in " the appendage.      CURRENT/PREVIOUS VISIT EKG  Results for orders placed or performed in visit on 03/22/23   IN OFFICE EKG 12-LEAD (to [employer])    Collection Time: 03/22/23  9:15 AM    Narrative    Test Reason : I48.91,    Vent. Rate : 073 BPM     Atrial Rate : 073 BPM     P-R Int : 164 ms          QRS Dur : 086 ms      QT Int : 404 ms       P-R-T Axes : 057 055 032 degrees     QTc Int : 445 ms    Normal sinus rhythm  Normal ECG  When compared with ECG of 06-JUL-2022 10:16,  No significant change was found  Confirmed by Cy Lombardi MD ([id number]) on 3/29/2023 9:18:10 PM    Referred By:  GP           Confirmed By:Cy Lombardi MD     No valid procedures specified.   Results for orders placed in visit on 01/24/22    Nuclear Stress Test    Interpretation Summary    The EKG portion of this study is negative for ischemia.    The patient reported no chest pain during the stress test.    During stress, atrial fibrillation is noted.    The nuclear portion of this study will be reported separately.    No valid procedures specified.    PHYSICAL EXAM  CONSTITUTIONAL: Well built, well nourished in no apparent distress  NECK: no carotid bruit, no JVD  LUNGS: CTA  CHEST WALL: no tenderness  HEART: regular rate and rhythm, S1, S2 normal, no murmur, click, rub or gallop   ABDOMEN: soft, non-tender; bowel sounds normal  EXTREMITIES: Extremities normal, no edema  NEURO: AAO X 3    I HAVE REVIEWED :    The vital signs, nurses notes, and all the pertinent radiology and labs.        Current Outpatient Medications   Medication Instructions    amLODIPine (NORVASC) 10 mg, Oral, Nightly    benazepriL (LOTENSIN) 40 mg, Oral, Nightly    fluticasone propionate (FLONASE) 100 mcg, Each Nostril    metoprolol succinate (TOPROL-XL) 50 mg, Oral, Daily    rosuvastatin (CRESTOR) 5 mg, Oral, Daily    sotaloL (BETAPACE) 80 mg, Oral, 2 times daily    terazosin (HYTRIN) 1 mg, Oral, Nightly    triamterene-hydrochlorothiazide 37.5-25 mg (DYAZIDE) 37.5-25 mg  per capsule 1 capsule, Oral, Every other day          Assessment & Plan     Hypertension  Monitor blood pressure closely at home.  Hypertensive today in office.  Recommend continuing benazepril 40 mg daily, amlodipine 10 mg daily, sotalol 80 mg daily, metoprolol succinate 50 mg daily, and terazosin 1 mg daily, and triamterene hydrochlorothiazide every other day.  Low-sodium heart healthy diet.  Blood pressure is elevated today in office however patient states this is secondary to stress from trying to find a parking spot in the garage.  Recommend maintaining blood pressure 130/80 or less.    Hyperlipidemia  Continue statin therapy.  Continue low-sodium heart healthy diet.  Obtain fasting lipid panel    RONALDO (obstructive sleep apnea)  He was compliant with using his CPAP nightly.  Continue the same.    Severe obesity with body mass index (BMI) of 35.0 to 39.9 with comorbidity  BMI is 39.39.  He has been noncompliant with his cardiac calorie restricted diet.  Recommend compliance with calorie restricted diet.  Recommend exercise as tolerated and weight loss.    Unspecified disorder of conjunctiva  Patient reports corneal ulcer to right eye, continue to follow with ophthalmology.          Follow up in about 6 months (around 3/3/2025).

## 2024-09-04 ENCOUNTER — TELEPHONE (OUTPATIENT)
Dept: CARDIOLOGY | Facility: CLINIC | Age: 64
End: 2024-09-04
Payer: COMMERCIAL

## 2024-09-04 NOTE — TELEPHONE ENCOUNTER
----- Message from Anna Serra NP sent at 9/3/2024  5:01 PM CDT -----  Mr. Rodartebita,     I reviewed your labs.  Your HGB A1C 6.2.  This puts you in the prediabetic category.  Recommend diet, exercise, and weight loss.  Also recommend omega 3 fatty acids 1000 mg daily over the counter for elevated triglycerides.  Please let me know if you have further questions.      Thank you    Anna Serra NP

## 2024-10-23 ENCOUNTER — E-VISIT (OUTPATIENT)
Dept: FAMILY MEDICINE | Facility: CLINIC | Age: 64
End: 2024-10-23
Payer: COMMERCIAL

## 2024-10-23 DIAGNOSIS — I48.91 ATRIAL FIBRILLATION, NEW ONSET: ICD-10-CM

## 2024-10-23 RX ORDER — METOPROLOL SUCCINATE 50 MG/1
50 TABLET, EXTENDED RELEASE ORAL
Qty: 90 TABLET | Refills: 1 | OUTPATIENT
Start: 2024-10-23

## 2024-10-23 NOTE — TELEPHONE ENCOUNTER
No care due was identified.  [facility] Embedded Care Due Messages. Reference number: [id number].   10/23/2024 3:04:58 PM CDT

## 2024-10-24 VITALS — DIASTOLIC BLOOD PRESSURE: 82 MMHG | SYSTOLIC BLOOD PRESSURE: 124 MMHG

## 2024-10-24 RX ORDER — METOPROLOL SUCCINATE 50 MG/1
50 TABLET, EXTENDED RELEASE ORAL DAILY
Qty: 90 TABLET | Refills: 1 | Status: SHIPPED | OUTPATIENT
Start: 2024-10-24

## 2024-10-24 NOTE — PROGRESS NOTES
Patient ID: Rich Ballard is a 64 y.o. male.    Chief Complaint: Hypertension (Entered automatically based on patient selection in [employer].)          274}  The patient initiated a request through [employer] on 10/23/2024 for evaluation and management with a chief complaint of Hypertension (Entered automatically based on patient selection in [employer].)     I evaluated the questionnaire submission on 10/24/2024 .    Total Time (in minutes): 8     [facility] Peq Evisit Hypertension    10/24/2024  7:21 AM CDT - Filed by Patient   Do you agree to participate in an E-Visit? Yes   If you have any of the following symptoms, please do not complete an E-Visit. Instead, schedule an appointment with your provider I acknowledge   Choose the state of your primary residence [place]   What would you like addressed about your blood pressure? New concern   What is the main issue you would like addressed today? Prescription renewal   Your blood pressure is a: Chronic problem   When were you first diagnosed with high blood pressure? More than 1 year ago   Since you first learned you had high blood pressure, how has it changed? Unchanged   How would you classify your blood pressure? Well controlled   Are you having any of the following symptoms from your high blood pressure? Fatigue   Are you taking any of the following medications? None of these   The following factors can make high blood pressure worse or harder to control. Which of them might be contributing to your high blood pressure?  None of these   Have you taken blood pressure medications in the past that caused you problems or side effects? No   Are you currently taking medication(s) for your blood pressure? Yes   Have you recently started a new medication or changed your dose? No   How often are you taking your medication per week?  Every day   Have you had any side effects from your current blood pressure medication? No   Are you able to take your blood pressure? Yes   Please give  your most recent blood pressure readings    Reading 1 124/82 10/23/24   Reading 2    Reading 3    Reading 4    Reading 5    If you are able to take your pulse, please provide it below. 78   Provide any additional information you feel is important. N/A   Please attach any relevant images or files    Are you able to take any other vitals? No          Active Problem List with Overview Notes    Diagnosis Date Noted    RONALDO (obstructive sleep apnea) 10/23/2023    Seasonal allergic rhinitis 06/19/2023    Upper respiratory tract infection 06/19/2023    Tachycardia 01/14/2022    Irregularly irregular pulse rhythm 01/14/2022    Atrial fibrillation, new onset 01/14/2022    Severe obesity with body mass index (BMI) of 35.0 to 39.9 with comorbidity 06/23/2021    Screen for colon cancer 02/01/2017    Hypertension     Hyperlipidemia     Corneal edema, unspecified 06/06/2011     Dx updated per 2019 IMO Load      Unspecified disorder of conjunctiva 06/05/2011      Recent Labs Obtained:  Lab Results   Component Value Date    WBC 7.15 09/03/2024    HGB 14.8 09/03/2024    HCT 44.4 09/03/2024    MCV 93 09/03/2024     09/03/2024     09/03/2024    K 4.4 09/03/2024    GLU 87 09/03/2024    CREATININE 0.8 09/03/2024    EGFRNORACEVR >60.0 09/03/2024    HGBA1C 6.2 09/03/2024    TSH 1.005 09/03/2024      Review of patient's allergies indicates:   Allergen Reactions    Amiodarone analogues Shortness Of Breath       Encounter Diagnosis   Name Primary?    Atrial fibrillation, new onset         No orders of the defined types were placed in this encounter.     Medications Ordered This Encounter   Medications    metoprolol succinate (TOPROL-XL) 50 MG 24 hr tablet     Sig: Take 1 tablet (50 mg total) by mouth once daily.     Dispense:  90 tablet     Refill:  1     .        E-Visit Time Tracking:    Day 1 Time (in minutes): 8    Total Time (in minutes): 8      274}

## 2025-04-19 DIAGNOSIS — I48.91 ATRIAL FIBRILLATION, NEW ONSET: ICD-10-CM

## 2025-04-19 NOTE — TELEPHONE ENCOUNTER
Care Due:                  Date            Visit Type   Department     Provider  --------------------------------------------------------------------------------    Last Visit: None Found      None         None Found  Next Visit: None Scheduled  None         None Found                                                            Last  Test          Frequency    Reason                     Performed    Due Date  --------------------------------------------------------------------------------    Office Visit  15 months..  amLODIPine, benazepriL,    Not Found    Overdue                             metoprolol, rosuvastatin,                             terazosin,                             triamterene-hydrochloroth                             iazide...................    [employer] Embedded Care Due Messages. Reference number: [id number].   4/19/2025 7:03:44 AM CDT

## 2025-04-21 RX ORDER — METOPROLOL SUCCINATE 50 MG/1
50 TABLET, EXTENDED RELEASE ORAL
Qty: 90 TABLET | Refills: 1 | Status: SHIPPED | OUTPATIENT
Start: 2025-04-21 | End: 2025-04-22 | Stop reason: SDUPTHER

## 2025-04-22 ENCOUNTER — E-VISIT (OUTPATIENT)
Dept: FAMILY MEDICINE | Facility: CLINIC | Age: 65
End: 2025-04-22
Payer: COMMERCIAL

## 2025-04-22 ENCOUNTER — PATIENT MESSAGE (OUTPATIENT)
Dept: GASTROENTEROLOGY | Facility: CLINIC | Age: 65
End: 2025-04-22
Payer: COMMERCIAL

## 2025-04-22 VITALS — SYSTOLIC BLOOD PRESSURE: 130 MMHG | DIASTOLIC BLOOD PRESSURE: 80 MMHG

## 2025-04-22 DIAGNOSIS — E78.2 MIXED HYPERLIPIDEMIA: Primary | ICD-10-CM

## 2025-04-22 DIAGNOSIS — Z12.11 COLON CANCER SCREENING: ICD-10-CM

## 2025-04-22 DIAGNOSIS — H11.9 UNSPECIFIED DISORDER OF CONJUNCTIVA: ICD-10-CM

## 2025-04-22 DIAGNOSIS — I10 ESSENTIAL HYPERTENSION: ICD-10-CM

## 2025-04-22 DIAGNOSIS — I48.91 ATRIAL FIBRILLATION, NEW ONSET: ICD-10-CM

## 2025-04-22 RX ORDER — AMLODIPINE BESYLATE 10 MG/1
10 TABLET ORAL NIGHTLY
Qty: 90 TABLET | Refills: 0 | Status: SHIPPED | OUTPATIENT
Start: 2025-04-22

## 2025-04-22 RX ORDER — ROSUVASTATIN CALCIUM 5 MG/1
5 TABLET, COATED ORAL DAILY
Qty: 90 TABLET | Refills: 1 | Status: SHIPPED | OUTPATIENT
Start: 2025-04-22 | End: 2026-04-22

## 2025-04-22 RX ORDER — SOTALOL HYDROCHLORIDE 80 MG/1
80 TABLET ORAL 2 TIMES DAILY
Qty: 180 TABLET | Refills: 0 | Status: SHIPPED | OUTPATIENT
Start: 2025-04-22 | End: 2026-04-22

## 2025-04-22 RX ORDER — METOPROLOL SUCCINATE 50 MG/1
50 TABLET, EXTENDED RELEASE ORAL DAILY
Qty: 90 TABLET | Refills: 0 | Status: SHIPPED | OUTPATIENT
Start: 2025-04-22

## 2025-04-22 RX ORDER — BENAZEPRIL HYDROCHLORIDE 40 MG/1
40 TABLET ORAL NIGHTLY
Qty: 90 TABLET | Refills: 3 | Status: SHIPPED | OUTPATIENT
Start: 2025-04-22

## 2025-04-22 RX ORDER — TRIAMTERENE AND HYDROCHLOROTHIAZIDE 37.5; 25 MG/1; MG/1
1 CAPSULE ORAL EVERY OTHER DAY
Qty: 45 CAPSULE | Refills: 0 | Status: SHIPPED | OUTPATIENT
Start: 2025-04-22 | End: 2026-04-22

## 2025-04-22 NOTE — PROGRESS NOTES
Patient ID: Rich Ballard is a 64 y.o. male.    Chief Complaint: General Illness (Entered automatically based on patient selection in [employer].)             274}  The patient initiated a request through [employer] on 4/22/2025 for evaluation and management with a chief complaint of General Illness (Entered automatically based on patient selection in [employer].)     I evaluated the questionnaire submission on 04/22/2025 .    Total Time (in minutes): 15     Ohs Peq Evisit Supergroup-Chronic Conditions    4/22/2025 10:35 AM CDT - Filed by Patient   What do you need help with? High Blood Pressure   Do you agree to participate in an E-Visit? Yes   If you have any of the following symptoms, please do not complete an E-Visit. Instead, schedule an appointment with your provider I acknowledge   What would you like addressed about your blood pressure? New concern   What is the main issue you would like addressed today? Refill all blood pressure meds   Your blood pressure is a: Chronic problem   When were you first diagnosed with high blood pressure? More than 1 year ago   Since you first learned you had high blood pressure, how has it changed? Unchanged   How would you classify your blood pressure? Well controlled   Are you having any of the following symptoms from your high blood pressure? None of the above   Are you taking any of the following medications? None of these   The following factors can make high blood pressure worse or harder to control. Which of them might be contributing to your high blood pressure?  Excess weight;  Lack of exercise   Have you taken blood pressure medications in the past that caused you problems or side effects? No   Are you currently taking medication(s) for your blood pressure? Yes   Have you recently started a new medication or changed your dose? No   How often are you taking your medication per week?  Every day   Have you had any side effects from your current blood pressure medication? No    Are you able to take your blood pressure? Yes   Please give your most recent blood pressure readings    Reading 1 134/84  4/14/25 4:00 am   Reading 2 128/80   4/ 16/25 6:00 am   Reading 3 132/82    4/17/25 7:00 am   Reading 4 136/82.   4/19/25  5:00 am   Reading 5 130/80.   4/21/25  6:00 am   If you are able to take your pulse, please provide it below. 78   Provide any additional information you feel is important.    Please attach any relevant images or files    Are you able to take any other vitals? No          Active Problem List with Overview Notes    Diagnosis Date Noted    RONALDO (obstructive sleep apnea) 10/23/2023    Seasonal allergic rhinitis 06/19/2023    Upper respiratory tract infection 06/19/2023    Tachycardia 01/14/2022    Irregularly irregular pulse rhythm 01/14/2022    Atrial fibrillation, new onset 01/14/2022    Severe obesity with body mass index (BMI) of 35.0 to 39.9 with comorbidity 06/23/2021    Screen for colon cancer 02/01/2017    Hypertension     Hyperlipidemia     Corneal edema, unspecified 06/06/2011     Dx updated per 2019 IMO Load      Unspecified disorder of conjunctiva 06/05/2011      Recent Labs Obtained:  Lab Results   Component Value Date    WBC 7.15 09/03/2024    HGB 14.8 09/03/2024    HCT 44.4 09/03/2024    MCV 93 09/03/2024     09/03/2024     09/03/2024    K 4.4 09/03/2024    GLU 87 09/03/2024    CREATININE 0.8 09/03/2024    EGFRNORACEVR >60.0 09/03/2024    HGBA1C 6.2 09/03/2024    TSH 1.005 09/03/2024      Review of patient's allergies indicates:   Allergen Reactions    Amiodarone analogues Shortness Of Breath       Encounter Diagnoses   Name Primary?    Essential hypertension     Atrial fibrillation, new onset     Mixed hyperlipidemia Yes    Unspecified disorder of conjunctiva     Colon cancer screening         Orders Placed This Encounter   Procedures    Case Request Endoscopy: COLONOSCOPY     Medical Necessity::   Medically Non-Urgent [100]     Case Referring  Provider:   JAYCE LEOS [[id number]]     Is an on-site pathologist required for this procedure?:   N/A      Medications Ordered This Encounter   Medications    amLODIPine (NORVASC) 10 MG tablet     Sig: Take 1 tablet (10 mg total) by mouth every evening.     Dispense:  90 tablet     Refill:  0     Requesting 1 year supply    benazepriL (LOTENSIN) 40 MG tablet     Sig: Take 1 tablet (40 mg total) by mouth every evening.     Dispense:  90 tablet     Refill:  3     Requesting 1 year supply    metoprolol succinate (TOPROL-XL) 50 MG 24 hr tablet     Sig: Take 1 tablet (50 mg total) by mouth once daily.     Dispense:  90 tablet     Refill:  0     .    rosuvastatin (CRESTOR) 5 MG tablet     Sig: Take 1 tablet (5 mg total) by mouth once daily.     Dispense:  90 tablet     Refill:  1    sotaloL (BETAPACE) 80 MG tablet     Sig: Take 1 tablet (80 mg total) by mouth 2 (two) times daily.     Dispense:  180 tablet     Refill:  0    triamterene-hydrochlorothiazide 37.5-25 mg (DYAZIDE) 37.5-25 mg per capsule     Sig: Take 1 capsule by mouth every other day.     Dispense:  45 capsule     Refill:  0     .    Rec discuss with cardiology beta blockers     Rec colon screenign     E-Visit Time Tracking:    Day 1 Time (in minutes): 15    Total Time (in minutes): 15         274}

## 2025-06-13 DIAGNOSIS — I10 ESSENTIAL HYPERTENSION: ICD-10-CM

## 2025-06-13 RX ORDER — TRIAMTERENE AND HYDROCHLOROTHIAZIDE 37.5; 25 MG/1; MG/1
1 CAPSULE ORAL EVERY OTHER DAY
Qty: 45 CAPSULE | Refills: 3 | OUTPATIENT
Start: 2025-06-13 | End: 2026-06-13

## 2025-06-13 NOTE — TELEPHONE ENCOUNTER
Care Due:                  Date            Visit Type   Department     Provider  --------------------------------------------------------------------------------    Last Visit: None Found      None         None Found  Next Visit: None Scheduled  None         None Found                                                            Last  Test          Frequency    Reason                     Performed    Due Date  --------------------------------------------------------------------------------    CMP.........  12 months..  benazepriL, rosuvastatin,   09- 08-                             triamterene-hydrochloroth                             iazide...................    Lipid Panel.  12 months..  rosuvastatin.............  09- 08-    [facility] Embedded Care Due Messages. Reference number: [id number].   6/13/2025 12:25:33 AM CDT

## 2025-07-24 RX ORDER — TERAZOSIN 1 MG/1
1 CAPSULE ORAL NIGHTLY
Qty: 90 CAPSULE | Refills: 3 | Status: SHIPPED | OUTPATIENT
Start: 2025-07-24 | End: 2026-07-24

## 2025-07-24 NOTE — TELEPHONE ENCOUNTER
No care due was identified.  [facility] Embedded Care Due Messages. Reference number: [id number].   7/24/2025 1:55:30 PM CDT

## 2025-07-31 ENCOUNTER — PATIENT MESSAGE (OUTPATIENT)
Dept: GASTROENTEROLOGY | Facility: CLINIC | Age: 65
End: 2025-07-31
Payer: COMMERCIAL

## 2025-08-21 DIAGNOSIS — I10 ESSENTIAL HYPERTENSION: ICD-10-CM

## 2025-08-21 RX ORDER — AMLODIPINE BESYLATE 10 MG/1
10 TABLET ORAL NIGHTLY
Qty: 90 TABLET | Refills: 0 | OUTPATIENT
Start: 2025-08-21

## 2025-08-21 RX ORDER — AMLODIPINE BESYLATE 10 MG/1
10 TABLET ORAL NIGHTLY
Qty: 90 TABLET | Refills: 0 | Status: SHIPPED | OUTPATIENT
Start: 2025-08-21

## 2025-09-02 ENCOUNTER — OFFICE VISIT (OUTPATIENT)
Dept: FAMILY MEDICINE | Facility: CLINIC | Age: 65
End: 2025-09-02
Payer: COMMERCIAL

## 2025-09-02 ENCOUNTER — LAB VISIT (OUTPATIENT)
Dept: LAB | Facility: HOSPITAL | Age: 65
End: 2025-09-02
Payer: COMMERCIAL

## 2025-09-02 VITALS
HEART RATE: 72 BPM | SYSTOLIC BLOOD PRESSURE: 132 MMHG | WEIGHT: 233.44 LBS | DIASTOLIC BLOOD PRESSURE: 80 MMHG | OXYGEN SATURATION: 95 % | BODY MASS INDEX: 37.52 KG/M2 | TEMPERATURE: 98 F | HEIGHT: 66 IN

## 2025-09-02 DIAGNOSIS — Z13.1 DIABETES MELLITUS SCREENING: ICD-10-CM

## 2025-09-02 DIAGNOSIS — I10 PRIMARY HYPERTENSION: ICD-10-CM

## 2025-09-02 DIAGNOSIS — Z12.5 PROSTATE CANCER SCREENING: ICD-10-CM

## 2025-09-02 DIAGNOSIS — I48.91 ATRIAL FIBRILLATION, NEW ONSET: ICD-10-CM

## 2025-09-02 DIAGNOSIS — E78.2 MIXED HYPERLIPIDEMIA: ICD-10-CM

## 2025-09-02 DIAGNOSIS — G47.33 OSA (OBSTRUCTIVE SLEEP APNEA): ICD-10-CM

## 2025-09-02 DIAGNOSIS — Z00.00 ENCOUNTER FOR ANNUAL HEALTH EXAMINATION: Primary | ICD-10-CM

## 2025-09-02 LAB
ABSOLUTE EOSINOPHIL (OHS): 0.1 K/UL
ABSOLUTE MONOCYTE (OHS): 0.64 K/UL (ref 0.3–1)
ABSOLUTE NEUTROPHIL COUNT (OHS): 3.33 K/UL (ref 1.8–7.7)
ALBUMIN SERPL BCP-MCNC: 4.5 G/DL (ref 3.5–5.2)
ALP SERPL-CCNC: 58 UNIT/L (ref 40–150)
ALT SERPL W/O P-5'-P-CCNC: 48 UNIT/L (ref 0–55)
ANION GAP (OHS): 15 MMOL/L (ref 8–16)
AST SERPL-CCNC: 32 UNIT/L (ref 0–50)
BASOPHILS # BLD AUTO: 0.04 K/UL
BASOPHILS NFR BLD AUTO: 0.6 %
BILIRUB SERPL-MCNC: 0.5 MG/DL (ref 0.1–1)
BUN SERPL-MCNC: 17 MG/DL (ref 8–23)
CALCIUM SERPL-MCNC: 9.7 MG/DL (ref 8.7–10.5)
CHLORIDE SERPL-SCNC: 99 MMOL/L (ref 95–110)
CHOLEST SERPL-MCNC: 188 MG/DL (ref 120–199)
CHOLEST/HDLC SERPL: 4.9 {RATIO} (ref 2–5)
CO2 SERPL-SCNC: 25 MMOL/L (ref 23–29)
CREAT SERPL-MCNC: 0.9 MG/DL (ref 0.5–1.4)
EAG (OHS): 108 MG/DL (ref 68–131)
ERYTHROCYTE [DISTWIDTH] IN BLOOD BY AUTOMATED COUNT: 12.9 % (ref 11.5–14.5)
GFR SERPLBLD CREATININE-BSD FMLA CKD-EPI: >60 ML/MIN/1.73/M2
GLUCOSE SERPL-MCNC: 89 MG/DL (ref 70–110)
HBA1C MFR BLD: 5.4 % (ref 4–5.6)
HCT VFR BLD AUTO: 48.7 % (ref 40–54)
HDLC SERPL-MCNC: 38 MG/DL (ref 40–75)
HDLC SERPL: 20.2 % (ref 20–50)
HGB BLD-MCNC: 15.4 GM/DL (ref 14–18)
IMM GRANULOCYTES # BLD AUTO: 0.01 K/UL (ref 0–0.04)
IMM GRANULOCYTES NFR BLD AUTO: 0.1 % (ref 0–0.5)
LDLC SERPL CALC-MCNC: 121 MG/DL (ref 63–159)
LYMPHOCYTES # BLD AUTO: 2.66 K/UL (ref 1–4.8)
MCH RBC QN AUTO: 30.1 PG (ref 27–31)
MCHC RBC AUTO-ENTMCNC: 31.6 G/DL (ref 32–36)
MCV RBC AUTO: 95 FL (ref 82–98)
NONHDLC SERPL-MCNC: 150 MG/DL
NUCLEATED RBC (/100WBC) (OHS): 0 /100 WBC
PLATELET # BLD AUTO: 201 K/UL (ref 150–450)
PMV BLD AUTO: 9.7 FL (ref 9.2–12.9)
POTASSIUM SERPL-SCNC: 4.2 MMOL/L (ref 3.5–5.1)
PROT SERPL-MCNC: 7.9 GM/DL (ref 6–8.4)
PSA SERPL-MCNC: 1.82 NG/ML
RBC # BLD AUTO: 5.11 M/UL (ref 4.6–6.2)
RELATIVE EOSINOPHIL (OHS): 1.5 %
RELATIVE LYMPHOCYTE (OHS): 39.2 % (ref 18–48)
RELATIVE MONOCYTE (OHS): 9.4 % (ref 4–15)
RELATIVE NEUTROPHIL (OHS): 49.2 % (ref 38–73)
SODIUM SERPL-SCNC: 139 MMOL/L (ref 136–145)
TRIGL SERPL-MCNC: 145 MG/DL (ref 30–150)
TSH SERPL-ACNC: 0.82 UIU/ML (ref 0.4–4)
WBC # BLD AUTO: 6.78 K/UL (ref 3.9–12.7)

## 2025-09-02 PROCEDURE — 99999 PR PBB SHADOW E&M-EST. PATIENT-LVL IV: CPT | Mod: PBBFAC,,,

## 2025-09-02 PROCEDURE — 36415 COLL VENOUS BLD VENIPUNCTURE: CPT | Mod: PO

## 2025-09-02 PROCEDURE — 80061 LIPID PANEL: CPT

## 2025-09-02 PROCEDURE — 84153 ASSAY OF PSA TOTAL: CPT

## 2025-09-02 PROCEDURE — 84443 ASSAY THYROID STIM HORMONE: CPT

## 2025-09-02 PROCEDURE — 84244 ASSAY OF RENIN: CPT

## 2025-09-02 PROCEDURE — 84520 ASSAY OF UREA NITROGEN: CPT

## 2025-09-02 PROCEDURE — 85025 COMPLETE CBC W/AUTO DIFF WBC: CPT

## 2025-09-02 PROCEDURE — 83036 HEMOGLOBIN GLYCOSYLATED A1C: CPT

## 2025-09-03 ENCOUNTER — TELEPHONE (OUTPATIENT)
Dept: FAMILY MEDICINE | Facility: CLINIC | Age: 65
End: 2025-09-03
Payer: COMMERCIAL

## 2025-09-05 LAB
ALDOST SERPL-MCNC: 33 NG/DL
ALDOST/RENIN PLAS-RTO: 1.8 RATIO
RENIN PLAS-CCNC: 18.2 NG/ML/HR

## (undated) DEVICE — CATH LASSO NAV 25/15

## (undated) DEVICE — TRAY CATH FOL SIL URIMTR 16FR

## (undated) DEVICE — CATH SOUNDSTAR ECO SMS 8FR

## (undated) DEVICE — PATCH CARTO REFERENCE

## (undated) DEVICE — NDL TRNSSPTL BRK-1 18GA 71CM

## (undated) DEVICE — INTRO FAST-CATH SL1 8.5FR 63CM

## (undated) DEVICE — INTRODUCER HEMOSTASIS 7.5F

## (undated) DEVICE — SET HBE EXT CARESITE FILTER

## (undated) DEVICE — SEE MEDLINE ITEM 107746

## (undated) DEVICE — NDL TRNSSPTL BRK-1 18GA 98CM

## (undated) DEVICE — COVER DRAPE ACUSON STERILE

## (undated) DEVICE — CATH THERMOCOOL SMTCH SF D F

## (undated) DEVICE — CATH BIDIRECTIONAL DF CRV 7FR

## (undated) DEVICE — PAD DEFIB CADENCE ADULT R2

## (undated) DEVICE — INTRO AGILIS MED CRL 8.5F 71CM

## (undated) DEVICE — KIT PROBE COVER WITH GEL

## (undated) DEVICE — ELECTRODE REM PLYHSV RETURN 9

## (undated) DEVICE — SHEATH INTRODUCER 9FR 11CM

## (undated) DEVICE — PAD RADI FEMORAL

## (undated) DEVICE — SHEATH HEMOSTASIS 8.5FR

## (undated) DEVICE — STETHOSCOPE ESOPHAGEAL 18FR

## (undated) DEVICE — LINE PRESSURE MONITORING 96IN

## (undated) DEVICE — BOWL FLUID - BACK STOP

## (undated) DEVICE — SET SMARTABLATE IRR TUBE

## (undated) DEVICE — PACK EP DRAPE